# Patient Record
Sex: FEMALE | HISPANIC OR LATINO | Employment: FULL TIME | URBAN - METROPOLITAN AREA
[De-identification: names, ages, dates, MRNs, and addresses within clinical notes are randomized per-mention and may not be internally consistent; named-entity substitution may affect disease eponyms.]

---

## 2017-02-17 DIAGNOSIS — E11.9 TYPE 2 DIABETES MELLITUS WITHOUT COMPLICATION: ICD-10-CM

## 2017-06-15 ENCOUNTER — OFFICE VISIT (OUTPATIENT)
Dept: FAMILY MEDICINE | Facility: CLINIC | Age: 37
End: 2017-06-15
Payer: COMMERCIAL

## 2017-06-15 VITALS
OXYGEN SATURATION: 97 % | WEIGHT: 195.31 LBS | BODY MASS INDEX: 29.6 KG/M2 | HEART RATE: 103 BPM | TEMPERATURE: 98 F | HEIGHT: 68 IN | DIASTOLIC BLOOD PRESSURE: 80 MMHG | SYSTOLIC BLOOD PRESSURE: 120 MMHG

## 2017-06-15 DIAGNOSIS — Z00.00 ANNUAL PHYSICAL EXAM: Primary | ICD-10-CM

## 2017-06-15 DIAGNOSIS — N89.8 VAGINAL DISCHARGE: ICD-10-CM

## 2017-06-15 LAB
CANDIDA RRNA VAG QL PROBE: NEGATIVE
G VAGINALIS RRNA GENITAL QL PROBE: NEGATIVE
T VAGINALIS RRNA GENITAL QL PROBE: NEGATIVE

## 2017-06-15 PROCEDURE — 99999 PR PBB SHADOW E&M-EST. PATIENT-LVL IV: CPT | Mod: PBBFAC,,, | Performed by: FAMILY MEDICINE

## 2017-06-15 PROCEDURE — 87480 CANDIDA DNA DIR PROBE: CPT

## 2017-06-15 PROCEDURE — 99395 PREV VISIT EST AGE 18-39: CPT | Mod: S$GLB,,, | Performed by: FAMILY MEDICINE

## 2017-06-15 RX ORDER — METFORMIN HYDROCHLORIDE 1000 MG/1
1000 TABLET ORAL 2 TIMES DAILY WITH MEALS
Qty: 180 TABLET | Refills: 0 | Status: SHIPPED | OUTPATIENT
Start: 2017-06-15 | End: 2017-11-02 | Stop reason: SDUPTHER

## 2017-06-15 RX ORDER — INSULIN ASPART 100 [IU]/ML
8 INJECTION, SOLUTION INTRAVENOUS; SUBCUTANEOUS
Qty: 15 ML | Refills: 1 | Status: CANCELLED | OUTPATIENT
Start: 2017-06-15 | End: 2018-06-15

## 2017-06-15 RX ORDER — FLUCONAZOLE 150 MG/1
TABLET ORAL
Qty: 2 TABLET | Refills: 0 | Status: SHIPPED | OUTPATIENT
Start: 2017-06-15 | End: 2017-06-19 | Stop reason: SDUPTHER

## 2017-06-16 PROBLEM — Z00.00 ANNUAL PHYSICAL EXAM: Status: ACTIVE | Noted: 2017-06-16

## 2017-06-19 ENCOUNTER — OFFICE VISIT (OUTPATIENT)
Dept: OBSTETRICS AND GYNECOLOGY | Facility: CLINIC | Age: 37
End: 2017-06-19
Payer: COMMERCIAL

## 2017-06-19 VITALS
DIASTOLIC BLOOD PRESSURE: 68 MMHG | HEIGHT: 68 IN | SYSTOLIC BLOOD PRESSURE: 114 MMHG | BODY MASS INDEX: 29.7 KG/M2 | WEIGHT: 196 LBS

## 2017-06-19 DIAGNOSIS — N89.8 VAGINAL DISCHARGE: ICD-10-CM

## 2017-06-19 DIAGNOSIS — Z31.69 ENCOUNTER FOR PRECONCEPTION CONSULTATION: ICD-10-CM

## 2017-06-19 DIAGNOSIS — Z01.419 WELL FEMALE EXAM WITH ROUTINE GYNECOLOGICAL EXAM: Primary | ICD-10-CM

## 2017-06-19 DIAGNOSIS — Z30.011 ENCOUNTER FOR INITIAL PRESCRIPTION OF CONTRACEPTIVE PILLS: ICD-10-CM

## 2017-06-19 DIAGNOSIS — Z30.432 ENCOUNTER FOR IUD REMOVAL: ICD-10-CM

## 2017-06-19 PROCEDURE — 99395 PREV VISIT EST AGE 18-39: CPT | Mod: S$GLB,,, | Performed by: NURSE PRACTITIONER

## 2017-06-19 PROCEDURE — 87480 CANDIDA DNA DIR PROBE: CPT

## 2017-06-19 PROCEDURE — 99999 PR PBB SHADOW E&M-EST. PATIENT-LVL III: CPT | Mod: PBBFAC,,, | Performed by: NURSE PRACTITIONER

## 2017-06-19 RX ORDER — METRONIDAZOLE 500 MG/1
500 TABLET ORAL 2 TIMES DAILY
Qty: 14 TABLET | Refills: 1 | Status: SHIPPED | OUTPATIENT
Start: 2017-06-19 | End: 2017-06-26

## 2017-06-19 RX ORDER — NORETHINDRONE ACETATE AND ETHINYL ESTRADIOL 1MG-20(21)
1 KIT ORAL DAILY
Qty: 30 TABLET | Refills: 11 | Status: SHIPPED | OUTPATIENT
Start: 2017-06-19 | End: 2017-10-25

## 2017-06-19 RX ORDER — FLUCONAZOLE 150 MG/1
TABLET ORAL
Qty: 2 TABLET | Refills: 4 | Status: SHIPPED | OUTPATIENT
Start: 2017-06-19 | End: 2018-02-22 | Stop reason: SDUPTHER

## 2017-06-19 NOTE — PROGRESS NOTES
HISTORY OF PRESENT ILLNESS:    Tonya Rivas is a 36 y.o. female, , No LMP recorded. Patient is not currently having periods (Reason: Birth Control).,  presents for a routine exam , IUD removal and to start OCPs.  -Her Iud has  and she has decided she doesn't want another one.   - is scheduled for a vasectomy, but until he actually goes for the appointment she wants low dose OCPs.  -Possibly wants to have another baby.  -Recently self treated for yeast and BV symptoms and wants culture to be sure these infections are gone and refills.   -These recurrent infections cause dyspareunia and decreased libido and not sure if related to the IUD or not.     Past Medical History:   Diagnosis Date    Chronic kidney disease     Diabetes mellitus type II     Herpes simplex virus (HSV) infection     History of migraine headaches     Kidney stones     Obesity 2013    VERA (obstructive sleep apnea)        Past Surgical History:   Procedure Laterality Date     SECTION      x3    EXTRACORPOREAL SHOCK WAVE LITHOTRIPSY      LAPAROSCOPIC GASTRIC BANDING         MEDICATIONS AND ALLERGIES:  Insulin  Prinivil  Glucophage  Review of patient's allergies indicates:  No Known Allergies    Family History   Problem Relation Age of Onset    Cancer Mother     Learning disabilities Son     Heart disease Maternal Grandfather     Breast cancer Neg Hx     Colon cancer Neg Hx     Ovarian cancer Neg Hx        Social History     Social History    Marital status:      Spouse name: N/A    Number of children: N/A    Years of education: N/A     Occupational History    Not on file.     Social History Main Topics    Smoking status: Never Smoker    Smokeless tobacco: Never Used    Alcohol use No      Comment: social    Drug use: No    Sexual activity: Yes     Partners: Male     Birth control/ protection: IUD     Other Topics Concern    Not on file     Social History Narrative    No narrative on  "file       OBSTETRIC HISTORY:   Number of cesareans: 3   Number of vaginal deliveries: 1  Number of PTD: 2  Number of living children: 3 (one  at age 3 due to lissencephaly)     COMPREHENSIVE GYN HISTORY:  PAP History: Denies abnormal Paps. LAST PAP 16 NORMAL.  Infection History: Reports STDs. Herpes. Denies PID.  Benign History: Denies uterine fibroids. Denies ovarian cysts. Denies endometriosis. Denies other conditions.  Cancer History: Denies cervical cancer. Denies uterine cancer or hyperplasia. Denies ovarian cancer. Denies vulvar cancer or pre-cancer. Denies vaginal cancer or pre-cancer. Denies breast cancer. Denies colon cancer.  Sexual Activity History: Reports being sexually active.  Menstrual History: Denies menses.   Dysmenorrhea History: Denies dysmenorrhea. Has monthly cramping ? IUD.   Contraception History: Mirena IUD 3-15-12 (second one).     ROS:  GENERAL: No weight changes. No swelling. No fatigue. No fever.  CARDIOVASCULAR: No chest pain. No shortness of breath. No leg cramps.   NEUROLOGICAL: No headaches. No vision changes.  BREASTS: No pain. No lumps. No discharge.  ABDOMEN: No pain. No nausea. No vomiting. No diarrhea. No constipation.  REPRODUCTIVE: AMENORRHEA.   VULVA: No pain. No lesions. No itching.  VAGINA: No relaxation. No itching. No odor. No discharge. No lesions.  URINARY: No incontinence. No nocturia. No frequency. No dysuria.    /68   Ht 5' 8" (1.727 m)   Wt 88.9 kg (196 lb)   BMI 29.80 kg/m²     PE:  APPEARANCE: Well nourished, well developed, in no acute distress.  AFFECT: WNL, alert and oriented x 3.  SKIN: No acne or hirsutism.  NECK: Neck symmetric, without masses or thyromegaly.  NODES: No inguinal, cervical, axillary or femoral lymph node enlargement.  CHEST: Good respiratory effort.   ABDOMEN: Soft. No tenderness or masses.   BREASTS: Symmetrical, no skin changes, visible lesions, palpable masses or nipple discharge bilaterally.  PELVIC: External female " genitalia without lesions.  Female hair distribution. Adequate perineal body, Normal urethral meatus. Vagina moist and well rugated without lesions. MUCOID D/C present.  No significant cystocele or rectocele present. Cervix pink without lesions, discharge or tenderness. IUD STRINGS VISUALIZED AT OS.  Uterus is 8 week size, regular, mobile and nontender. Adnexa without masses or tenderness.  EXTREMITIES: No edema    PROCEDURES:    IUD REMOVAL:    Tonya Rivas is a 36 y.o. female  presents for IUD removal because it is time for scheduled removal.    PRE-IUD REMOVAL COUNSELING:  The patient was advised of minimal risks of bleeding and pain and she agrees to proceed.    PROCEDURE:  A time out was performed.  IUD strings were visualized at the os and grasped. IUD removed with gentle traction. The patient tolerated the procedurewell.    ASSESSMENT:  Contraceptive Management / Removal IUD.    DIAGNOSIS:  1. Well female exam with routine gynecological exam    2. Encounter for IUD removal    3. Encounter for initial prescription of contraceptive pills    4. Vaginal discharge    5. Encounter for preconception consultation        PLAN:    Orders Placed This Encounter    Vaginosis Screen by DNA Probe    norethindrone-ethinyl estradiol (JUNEL FE ) 1 mg-20 mcg (21)/75 mg (7) per tablet    fluconazole (DIFLUCAN) 150 MG Tab    metronidazole (FLAGYL) 500 MG tablet     POST IUD REMOVAL COUNSELING:  Expect period-like flow to occur after Mirena IUD removal and periods to return to pre-IUD pattern.  Manage post IUD removal cramping with NSAIDs, Tylenol or Rx per [employer].    COUNSELING:  The patient was counseled today on:  -OCP use and potential side effects;  -vaginitis prevention same as note 1-16;  -Flagyl and Diflucan use and potential side effects;  -procreation and future pregnancy, including optimal timing for becoming pregnant, use of prenatal vitamins that contain folate and iron, avoidance of alcohol and  caffeine during early pregnancy, plus the negative effects of smoking on fecundity and on development of an early pregnancy, to review her immunization history and to update as necessary, to review her family history for potential inherited diseases that would require  screening;  -that she would be high risk for infertility, miscarriage, Pre-E again, baby with chromosomal abnormality if she decided to conceive again;  -A.C.S. Pap and pelvic exam guidelines (pap every 3 years), recomendations for yearly mammogram;  -to follow up with her PCP for other health maintenance.    FOLLOW-UP with me pending test results and annually.

## 2017-06-20 LAB
CANDIDA RRNA VAG QL PROBE: POSITIVE
G VAGINALIS RRNA GENITAL QL PROBE: NEGATIVE
T VAGINALIS RRNA GENITAL QL PROBE: NEGATIVE

## 2017-06-21 ENCOUNTER — TELEPHONE (OUTPATIENT)
Dept: ADMINISTRATIVE | Facility: HOSPITAL | Age: 37
End: 2017-06-21

## 2017-06-21 NOTE — TELEPHONE ENCOUNTER
A request was sent on today to Dr. Peña's office for a copy of the patient's most recent diabetic eye exam. I spoke w/ Dolores, she will fax over the report on today.

## 2017-09-18 PROBLEM — Z00.00 ANNUAL PHYSICAL EXAM: Status: RESOLVED | Noted: 2017-06-16 | Resolved: 2017-09-18

## 2017-10-19 ENCOUNTER — TELEPHONE (OUTPATIENT)
Dept: FAMILY MEDICINE | Facility: CLINIC | Age: 37
End: 2017-10-19

## 2017-10-19 NOTE — TELEPHONE ENCOUNTER
Patient would like an order for POCT pregnancy and serum pregnancy, in the event the POCTs is pos. Will get test done at [facility].

## 2017-10-25 ENCOUNTER — TELEPHONE (OUTPATIENT)
Dept: FAMILY MEDICINE | Facility: CLINIC | Age: 37
End: 2017-10-25

## 2017-10-25 ENCOUNTER — LAB VISIT (OUTPATIENT)
Dept: LAB | Facility: HOSPITAL | Age: 37
End: 2017-10-25
Attending: FAMILY MEDICINE
Payer: COMMERCIAL

## 2017-10-25 ENCOUNTER — OFFICE VISIT (OUTPATIENT)
Dept: FAMILY MEDICINE | Facility: CLINIC | Age: 37
End: 2017-10-25
Payer: COMMERCIAL

## 2017-10-25 VITALS
HEIGHT: 68 IN | TEMPERATURE: 98 F | WEIGHT: 203.25 LBS | BODY MASS INDEX: 30.8 KG/M2 | OXYGEN SATURATION: 99 % | SYSTOLIC BLOOD PRESSURE: 130 MMHG | DIASTOLIC BLOOD PRESSURE: 80 MMHG | RESPIRATION RATE: 12 BRPM | HEART RATE: 106 BPM

## 2017-10-25 DIAGNOSIS — E11.00 TYPE 2 DIABETES MELLITUS WITH HYPEROSMOLARITY WITHOUT COMA, WITHOUT LONG-TERM CURRENT USE OF INSULIN: ICD-10-CM

## 2017-10-25 DIAGNOSIS — E11.00 TYPE 2 DIABETES MELLITUS WITH HYPEROSMOLARITY WITHOUT COMA, WITHOUT LONG-TERM CURRENT USE OF INSULIN: Primary | ICD-10-CM

## 2017-10-25 DIAGNOSIS — Z32.01 POSITIVE PREGNANCY TEST: ICD-10-CM

## 2017-10-25 DIAGNOSIS — Z30.011 ENCOUNTER FOR INITIAL PRESCRIPTION OF CONTRACEPTIVE PILLS: ICD-10-CM

## 2017-10-25 LAB
ALBUMIN SERPL BCP-MCNC: 3.3 G/DL
ALP SERPL-CCNC: 106 U/L
ALT SERPL W/O P-5'-P-CCNC: 31 U/L
ANION GAP SERPL CALC-SCNC: 10 MMOL/L
AST SERPL-CCNC: 25 U/L
BASOPHILS # BLD AUTO: 0.02 K/UL
BASOPHILS NFR BLD: 0.3 %
BILIRUB SERPL-MCNC: 0.4 MG/DL
BUN SERPL-MCNC: 12 MG/DL
CALCIUM SERPL-MCNC: 9.4 MG/DL
CHLORIDE SERPL-SCNC: 100 MMOL/L
CHOLEST SERPL-MCNC: 209 MG/DL
CHOLEST/HDLC SERPL: 4.8 {RATIO}
CO2 SERPL-SCNC: 23 MMOL/L
CREAT SERPL-MCNC: 1 MG/DL
DIFFERENTIAL METHOD: NORMAL
EOSINOPHIL # BLD AUTO: 0.1 K/UL
EOSINOPHIL NFR BLD: 0.8 %
ERYTHROCYTE [DISTWIDTH] IN BLOOD BY AUTOMATED COUNT: 12 %
EST. GFR  (AFRICAN AMERICAN): >60 ML/MIN/1.73 M^2
EST. GFR  (NON AFRICAN AMERICAN): >60 ML/MIN/1.73 M^2
ESTIMATED AVG GLUCOSE: 260 MG/DL
GLUCOSE SERPL-MCNC: 485 MG/DL
HBA1C MFR BLD HPLC: 10.7 %
HCG INTACT+B SERPL-ACNC: <1.2 MIU/ML
HCT VFR BLD AUTO: 38.8 %
HDLC SERPL-MCNC: 44 MG/DL
HDLC SERPL: 21.1 %
HGB BLD-MCNC: 12.9 G/DL
IMM GRANULOCYTES # BLD AUTO: 0.02 K/UL
IMM GRANULOCYTES NFR BLD AUTO: 0.3 %
LDLC SERPL CALC-MCNC: 107.2 MG/DL
LYMPHOCYTES # BLD AUTO: 2 K/UL
LYMPHOCYTES NFR BLD: 32.9 %
MCH RBC QN AUTO: 28.2 PG
MCHC RBC AUTO-ENTMCNC: 33.2 G/DL
MCV RBC AUTO: 85 FL
MONOCYTES # BLD AUTO: 0.4 K/UL
MONOCYTES NFR BLD: 6.5 %
NEUTROPHILS # BLD AUTO: 3.5 K/UL
NEUTROPHILS NFR BLD: 59.2 %
NONHDLC SERPL-MCNC: 165 MG/DL
NRBC BLD-RTO: 0 /100 WBC
PLATELET # BLD AUTO: 275 K/UL
PMV BLD AUTO: 10.3 FL
POTASSIUM SERPL-SCNC: 4.2 MMOL/L
PROT SERPL-MCNC: 7.1 G/DL
RBC # BLD AUTO: 4.57 M/UL
SODIUM SERPL-SCNC: 133 MMOL/L
TRIGL SERPL-MCNC: 289 MG/DL
TSH SERPL DL<=0.005 MIU/L-ACNC: 0.73 UIU/ML
WBC # BLD AUTO: 5.98 K/UL

## 2017-10-25 PROCEDURE — 83036 HEMOGLOBIN GLYCOSYLATED A1C: CPT

## 2017-10-25 PROCEDURE — 99214 OFFICE O/P EST MOD 30 MIN: CPT | Mod: S$GLB,,, | Performed by: FAMILY MEDICINE

## 2017-10-25 PROCEDURE — 99999 PR PBB SHADOW E&M-EST. PATIENT-LVL III: CPT | Mod: PBBFAC,,, | Performed by: FAMILY MEDICINE

## 2017-10-25 PROCEDURE — 80053 COMPREHEN METABOLIC PANEL: CPT

## 2017-10-25 PROCEDURE — 84702 CHORIONIC GONADOTROPIN TEST: CPT

## 2017-10-25 PROCEDURE — 85025 COMPLETE CBC W/AUTO DIFF WBC: CPT

## 2017-10-25 PROCEDURE — 84443 ASSAY THYROID STIM HORMONE: CPT

## 2017-10-25 PROCEDURE — 80061 LIPID PANEL: CPT

## 2017-10-25 PROCEDURE — 36415 COLL VENOUS BLD VENIPUNCTURE: CPT | Mod: PO

## 2017-10-25 RX ORDER — NORELGESTROMIN AND ETHINYL ESTRADIOL 35; 150 UG/MG; UG/MG
1 PATCH TRANSDERMAL WEEKLY
Qty: 4 PATCH | Refills: 11 | Status: SHIPPED | OUTPATIENT
Start: 2017-10-25 | End: 2018-08-07 | Stop reason: ALTCHOICE

## 2017-10-25 NOTE — PROGRESS NOTES
Subjective:       Patient ID: Tonya Rivas is a 37 y.o. female.    Chief Complaint: Annual Exam    Patient presents for follow-up on diabetes. She states she stopped insulin and took herbal supplmentes and lost 45 pounds. She would like to try victoza. She was on metfromin for about 2 weeks now.     She is also going to [place] and wants to check on immunizations. 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 She would need typhoid, tetanus, and hepatitis A vaccines.    She also wants to make sure that she is not pregnant.    She also is taking a low dose contraception. She has had a positive urine pregnancy screening.       Diabetes   She has type 2 diabetes mellitus. No MedicAlert identification noted. The initial diagnosis of diabetes was made 11 years ago. Pertinent negatives for hypoglycemia include no confusion, dizziness, headaches, hunger, mood changes, nervousness/anxiousness, seizures, sleepiness, speech difficulty, sweats or tremors. Associated symptoms include fatigue. Pertinent negatives for diabetes include no blurred vision, no chest pain, no foot paresthesias, no foot ulcerations, no polydipsia, no polyphagia, no polyuria, no visual change, no weakness and no weight loss. Symptoms are stable. Pertinent negatives for diabetic complications include no autonomic neuropathy, CVA, heart disease, impotence, nephropathy, peripheral neuropathy, PVD or retinopathy. There are no known risk factors for coronary artery disease. Current diabetic treatment includes diet and oral agent (monotherapy). She is compliant with treatment most of the time. She is currently taking insulin at bedtime. Insulin injections are given by patient.     Review of Systems   Constitutional: Positive for fatigue. Negative for weight loss.   Eyes: Negative for blurred vision.   Cardiovascular: Negative for chest pain.   Endocrine: Negative for polydipsia, polyphagia and polyuria.   Genitourinary: Negative for dysuria,  "hematuria, impotence, pelvic pain and urgency.   Neurological: Negative for dizziness, tremors, seizures, speech difficulty, weakness and headaches.   Psychiatric/Behavioral: Negative for confusion. The patient is not nervous/anxious.        Objective:       Vitals:    10/25/17 1439   BP: 130/80   Pulse: 106   Resp: 12   Temp: 98.2 °F (36.8 °C)   TempSrc: Oral   SpO2: 99%   Weight: 92.2 kg (203 lb 4.2 oz)   Height: 5' 8" (1.727 m)       Physical Exam   Constitutional: She is oriented to person, place, and time. She appears well-developed and well-nourished. No distress.   HENT:   Head: Normocephalic and atraumatic.   Eyes: Conjunctivae are normal.   Neck: Normal range of motion. Neck supple. Carotid bruit is not present.   Cardiovascular: Normal rate, regular rhythm and normal heart sounds.  Exam reveals no gallop and no friction rub.    No murmur heard.  Pulmonary/Chest: Effort normal and breath sounds normal. No respiratory distress. She has no wheezes. She has no rales.   Musculoskeletal: She exhibits no edema.   Neurological: She is alert and oriented to person, place, and time.   Skin: She is not diaphoretic.       Assessment:       1. Type 2 diabetes mellitus with hyperosmolarity without coma, without long-term current use of insulin    2. Positive pregnancy test    3. Encounter for initial prescription of contraceptive pills        Plan:       Tonya was seen today for annual exam.    Diagnoses and all orders for this visit:    Type 2 diabetes mellitus with hyperosmolarity without coma, without long-term current use of insulin  -     Comprehensive metabolic panel; Future  -     Lipid panel; Future  -     Hemoglobin A1c; Future  -     Microalbumin/creatinine urine ratio; Future  -     TSH; Future  -     CBC auto differential; Future  Due for labs    Positive pregnancy test  -     Pregnancy Test Screening, Serum; Future    Encounter for initial prescription of contraceptive pills  -     norelgestromin-ethinyl " estradiol (ORTHO EVRA) 150-35 mcg/24 hr; Place 1 patch onto the skin once a week.

## 2017-10-26 ENCOUNTER — PATIENT MESSAGE (OUTPATIENT)
Dept: FAMILY MEDICINE | Facility: CLINIC | Age: 37
End: 2017-10-26

## 2017-10-26 NOTE — TELEPHONE ENCOUNTER
Received a call for a critical glucose of 485. Spoke with pt and she feels well, is not having nausea of pain. She tried to check her sugar while we were on the phone and it read error. She has novolog on hand, can get levemir filled. I advised her to give herself 10-15 units of novolog and check her sugar. May repeat again if error reading until sugar is in the 300s. Discussed test results and that cholesterol is high and she is not pregnant.    She may call again after giving the insulin and we can review her sugar results.     Alanis Herman MD

## 2017-10-27 ENCOUNTER — TELEPHONE (OUTPATIENT)
Dept: FAMILY MEDICINE | Facility: CLINIC | Age: 37
End: 2017-10-27

## 2017-10-27 RX ORDER — SALMONELLA TYPHI TY2 VI POLYSACCHARIDE ANTIGEN 25 UG/.5ML
INJECTION, SOLUTION INTRAMUSCULAR
Refills: 0 | COMMUNITY
Start: 2017-10-26 | End: 2018-08-07 | Stop reason: ALTCHOICE

## 2017-10-27 RX ORDER — TETANUS TOXOID, REDUCED DIPHTHERIA TOXOID AND ACELLULAR PERTUSSIS VACCINE, ADSORBED 5; 2.5; 8; 8; 2.5 [IU]/.5ML; [IU]/.5ML; UG/.5ML; UG/.5ML; UG/.5ML
SUSPENSION INTRAMUSCULAR
Refills: 0 | COMMUNITY
Start: 2017-10-26 | End: 2018-08-07 | Stop reason: ALTCHOICE

## 2017-10-27 NOTE — TELEPHONE ENCOUNTER
----- Message from Pau Granado sent at 10/27/2017 10:16 AM CDT -----  Contact: [facility] [phone]  Pt is requesting a refill on the novolog flex pen Please call  at your earliest convenience.  Thanks !

## 2017-10-27 NOTE — TELEPHONE ENCOUNTER
----- Message from Janeth Avelar sent at 10/27/2017  4:44 PM CDT -----  Contact: self  Patient is returning Marycarmen's call . She has questions about new meds       [phone]   LL

## 2017-10-27 NOTE — TELEPHONE ENCOUNTER
Spoke with patient.  Patient requesting prescription for Novolog Flex Pen.  Also, requesting medication that does not increase weight.  States will  the Victoza today. Reports compliance with Metformin twice a day and 40 pound weight loss.

## 2017-10-30 NOTE — TELEPHONE ENCOUNTER
She last saw Dr. Cabrera and discussed victoza.   I did not see her last and did not discuss this with her.  If she wishes to have this discussion with me, she would need an appointment.

## 2017-11-02 ENCOUNTER — TELEPHONE (OUTPATIENT)
Dept: FAMILY MEDICINE | Facility: CLINIC | Age: 37
End: 2017-11-02

## 2017-11-02 RX ORDER — METFORMIN HYDROCHLORIDE 1000 MG/1
TABLET ORAL
Qty: 180 TABLET | Refills: 0 | Status: SHIPPED | OUTPATIENT
Start: 2017-11-02 | End: 2018-02-22 | Stop reason: SDUPTHER

## 2017-11-02 NOTE — TELEPHONE ENCOUNTER
Patient stated she has tried those OTC medication and other prescribed pain medications, but nothing is relieving the pain.  Stated she will discuss with Dr. Blanco personally.

## 2017-11-02 NOTE — TELEPHONE ENCOUNTER
Patient requesting recommendations for arm pain after vaccines.  Stated she received Tdap and Hepatitis A vaccines on Wednesday (10/25/2017) at [facility]; and she is still having pain in her arm.  Stated earlier in the week the pain was radiating into her elbow.  Stated this is the first time this has happened after an injection.   Would like to know what she can do to get relief from the pain.    Patient stated that her daughter has an appointment with Dr. Blanco today @ 3:00pm.  If an office visit is needed, can she be scheduled close to that appointment.      Please advise.

## 2017-11-04 ENCOUNTER — PATIENT MESSAGE (OUTPATIENT)
Dept: FAMILY MEDICINE | Facility: CLINIC | Age: 37
End: 2017-11-04

## 2017-12-19 ENCOUNTER — TELEPHONE (OUTPATIENT)
Dept: ENDOCRINOLOGY | Facility: CLINIC | Age: 37
End: 2017-12-19

## 2017-12-19 NOTE — TELEPHONE ENCOUNTER
Left message on patient's voicemail to return call to clinic regarding scheduling Diabetes management appointment with Montserrat Macdonald NP for elevated A1c. Waiting to hear back.

## 2018-02-22 DIAGNOSIS — N89.8 VAGINAL DISCHARGE: ICD-10-CM

## 2018-02-22 RX ORDER — FLUCONAZOLE 150 MG/1
TABLET ORAL
Qty: 2 TABLET | Refills: 0 | OUTPATIENT
Start: 2018-02-22

## 2018-02-22 NOTE — TELEPHONE ENCOUNTER
LOV 10/25/2017  Last refill  Jardiance  11/6/2017                   Diflucan  6/19/2017                   Glucophage 11/2/2017    HgA1c  10.7  10/25/2017      Patient requesting orders for labs.  Please advise.

## 2018-02-22 NOTE — TELEPHONE ENCOUNTER
----- Message from Jessenia Vela sent at 2/22/2018 10:34 AM CST -----  Contact: self  Pt is requesting 90 day refill: empagliflozin (JARDIANCE) 25 mg Tab                                                 metFORMIN (GLUCOPHAGE) 1000 MG tablet                                                 fluconazole (DIFLUCAN) 150 MG Tab  Please add orders to chart for pt's labs.  Send to [facility] [address]  [place] [address] [phone]    Contact pt at [phone].    Thanks-

## 2018-02-23 DIAGNOSIS — N89.8 VAGINAL DISCHARGE: ICD-10-CM

## 2018-02-23 RX ORDER — FLUCONAZOLE 150 MG/1
TABLET ORAL
Qty: 2 TABLET | Refills: 0 | Status: SHIPPED | OUTPATIENT
Start: 2018-02-23 | End: 2018-08-07 | Stop reason: SDUPTHER

## 2018-02-23 RX ORDER — FLUCONAZOLE 150 MG/1
TABLET ORAL
Qty: 2 TABLET | Refills: 4 | Status: SHIPPED | OUTPATIENT
Start: 2018-02-23 | End: 2019-03-04 | Stop reason: SDUPTHER

## 2018-02-23 RX ORDER — METFORMIN HYDROCHLORIDE 1000 MG/1
1000 TABLET ORAL 2 TIMES DAILY WITH MEALS
Qty: 180 TABLET | Refills: 0 | Status: SHIPPED | OUTPATIENT
Start: 2018-02-23 | End: 2018-08-24 | Stop reason: SDUPTHER

## 2018-08-07 ENCOUNTER — OFFICE VISIT (OUTPATIENT)
Dept: FAMILY MEDICINE | Facility: CLINIC | Age: 38
End: 2018-08-07
Payer: COMMERCIAL

## 2018-08-07 VITALS
BODY MASS INDEX: 30.67 KG/M2 | HEART RATE: 78 BPM | HEIGHT: 68 IN | WEIGHT: 202.38 LBS | TEMPERATURE: 98 F | SYSTOLIC BLOOD PRESSURE: 135 MMHG | DIASTOLIC BLOOD PRESSURE: 86 MMHG

## 2018-08-07 DIAGNOSIS — L03.012 PARONYCHIA OF FINGER OF LEFT HAND: Primary | ICD-10-CM

## 2018-08-07 DIAGNOSIS — E11.9 TYPE 2 DIABETES MELLITUS WITHOUT COMPLICATION, WITHOUT LONG-TERM CURRENT USE OF INSULIN: ICD-10-CM

## 2018-08-07 PROCEDURE — 99999 PR PBB SHADOW E&M-EST. PATIENT-LVL III: CPT | Mod: PBBFAC,,, | Performed by: INTERNAL MEDICINE

## 2018-08-07 PROCEDURE — 99214 OFFICE O/P EST MOD 30 MIN: CPT | Mod: S$GLB,,, | Performed by: INTERNAL MEDICINE

## 2018-08-07 PROCEDURE — 3008F BODY MASS INDEX DOCD: CPT | Mod: CPTII,S$GLB,, | Performed by: INTERNAL MEDICINE

## 2018-08-07 PROCEDURE — 3079F DIAST BP 80-89 MM HG: CPT | Mod: CPTII,S$GLB,, | Performed by: INTERNAL MEDICINE

## 2018-08-07 PROCEDURE — 3046F HEMOGLOBIN A1C LEVEL >9.0%: CPT | Mod: CPTII,S$GLB,, | Performed by: INTERNAL MEDICINE

## 2018-08-07 PROCEDURE — 3075F SYST BP GE 130 - 139MM HG: CPT | Mod: CPTII,S$GLB,, | Performed by: INTERNAL MEDICINE

## 2018-08-07 RX ORDER — SULFAMETHOXAZOLE AND TRIMETHOPRIM 800; 160 MG/1; MG/1
1 TABLET ORAL 2 TIMES DAILY
Qty: 14 TABLET | Refills: 0 | Status: SHIPPED | OUTPATIENT
Start: 2018-08-07 | End: 2018-08-14

## 2018-08-07 NOTE — PROGRESS NOTES
This note was created by combination of typed  and Dragon dictation.  Transcription errors may be present.  If there are any questions, please contact me.    Assessment / Plan:   Paronychia of finger of left hand  -will treat with bactrim.  Keep clean and covered.  Stop the cipro.  Almost looks like pyogenic granuloma - if no improvement, to dermatology.  -     sulfamethoxazole-trimethoprim 800-160mg (BACTRIM DS) 800-160 mg Tab; Take 1 tablet by mouth 2 (two) times daily. for 7 days  Dispense: 14 tablet; Refill: 0    Type 2 diabetes mellitus without complication, without long-term current use of insulin  -had labs done recently with [facility] but finds it more convenient to come to [facility].  Outside labs requested.  [facility] for optometry - outside notes requested. If a1c high, on max dose of metformin, add back glipizide.  Hx of victoza but not covered and high out of pocket until deductible met. Hx of tresiba until insurance had changed.  Denies pregnancy risk and denies intent to get pregnant.  Has BCP patches and will restart.  Pneumovax done with work per pt    Medications Discontinued During This Encounter   Medication Reason    BOOSTRIX TDAP 2.5-8-5 Lf-mcg-Lf/0.5mL Syrg injection Therapy completed    empagliflozin (JARDIANCE) 25 mg Tab Therapy completed    fluconazole (DIFLUCAN) 150 MG Tab Duplicate Order    HAVRIX, PF, 1,440 Noreen unit/mL Susp Therapy completed    liraglutide 0.6 mg/0.1 mL, 18 mg/3 mL, subq PNIJ (VICTOZA 2-DOE) 0.6 mg/0.1 mL (18 mg/3 mL) PnIj Therapy completed    norelgestromin-ethinyl estradiol (ORTHO EVRA) 150-35 mcg/24 hr Therapy completed    TYPHIM VI 25 mcg/0.5 mL Soln Therapy completed     Modified Medications    No medications on file     New Prescriptions    SULFAMETHOXAZOLE-TRIMETHOPRIM 800-160MG (BACTRIM DS) 800-160 MG TAB    Take 1 tablet by mouth 2 (two) times daily. for 7 days         Follow Up: No Follow-up on file.      Subjective:   No chief complaint on  file.      HPI  Tonya is a 38 y.o. female, last appointment with this clinic was Visit date not found.    No LMP recorded. Patient is not currently having periods (Reason: Birth Control).    She comes in today complaining of bleeding and a growth on her finger.  She works in dialysis, and was clamping on some medications and she pinched her finger.  It was not a needle stick.  She did not think much of it until it started to sting when she applied alcohol hand .  Then she started noticing a growth appear on the lateral edge of her nail bed, and it started to drain serous fluid.  No associated systemic fevers or chills.  No swelling to the finger.  She had some leftover Cipro from when she was traveling and she started to take that without improvement.    Diabetes. Last a1c 10/2017, 10.7.  She most recently saw Dr. alfaro than once, and had labs done about a month ago but did not get the results of that.  She is currently taking metformin monotherapy 1000 twice daily.  Most recently history of Actos a but with insurance change she has a high deductible and thus Victoza is expensive.  Similarly with Trulicity.  History of Tresiba before she lost insurance previously.  That had actually worked well.  But then she got new insurance and she had had a high deductible so thus did not rechallenge on Tresiba.  Eyes Dr. Peña    Patient Care Team:  Marilia Woods MD as PCP - General (Family Medicine)    Patient Active Problem List    Diagnosis Date Noted    Type 2 diabetes mellitus 2016    Obesity 2013       PAST MEDICAL HISTORY:  Past Medical History:   Diagnosis Date    Chronic kidney disease     Diabetes mellitus type II     Herpes simplex virus (HSV) infection     History of migraine headaches     Kidney stones     Obesity 2013    VERA (obstructive sleep apnea)        PAST SURGICAL HISTORY:  Past Surgical History:   Procedure Laterality Date     SECTION      x3    EXTRACORPOREAL  "SHOCK WAVE LITHOTRIPSY      LAPAROSCOPIC GASTRIC BANDING         SOCIAL HISTORY:  Social History     Social History    Marital status:      Spouse name: N/A    Number of children: N/A    Years of education: N/A     Occupational History    Not on file.     Social History Main Topics    Smoking status: Never Smoker    Smokeless tobacco: Never Used    Alcohol use No      Comment: social    Drug use: No    Sexual activity: Yes     Partners: Male     Birth control/ protection: IUD     Other Topics Concern    Not on file     Social History Narrative    No narrative on file       ALLERGIES AND MEDICATIONS: updated and reviewed.  Review of patient's allergies indicates:  No Known Allergies  Current Outpatient Prescriptions   Medication Sig Dispense Refill    BD INSULIN SYRINGE ULTRA-FINE 1/2 mL 31 x 5/16" Syrg   2    blood sugar diagnostic Strp Checks 3 times daily for Relion Prime meter 100 each 12    BLOOD-GLUCOSE METER (ONE TOUCH BASIC SYSTEM MISC) x x x x.  test blood sugars twice daily.One touch ultra mini test strips and lancets brand      fluconazole (DIFLUCAN) 150 MG Tab TAKE 1 TABLET BY MOUTH ONCE AND MAY RETREAT IN 3 DAYS IF STILL SYMPTOMATIC 2 tablet 4    insulin syringe-needle U-100 (BD INSULIN SYRINGE ULTRA-FINE) 0.3 mL 31 gauge x 5/16 Syrg For twice a day insulin injections 90 each 2    lancets Misc 1 Device by Misc.(Non-Drug; Combo Route) route 2 (two) times daily. Patient has Relion Prime meter.  Tests 3 times daily      metFORMIN (GLUCOPHAGE) 1000 MG tablet Take 1 tablet (1,000 mg total) by mouth 2 (two) times daily with meals. 180 tablet 0    pen needle, diabetic (NOVOTWIST) 32 gauge x 1/5" Ndle Check blood sugar  each 3     No current facility-administered medications for this visit.        Review of Systems   Constitutional: Negative for chills and fever.   Respiratory: Negative for shortness of breath.    Cardiovascular: Negative for chest pain and palpitations. " "  Neurological: Negative for dizziness, tingling and headaches.       Objective:   Physical Exam   Vitals:    08/07/18 1422   BP: 135/86   Pulse: 78   Temp: 98.1 °F (36.7 °C)   Weight: 91.8 kg (202 lb 6.1 oz)   Height: 5' 8" (1.727 m)    Body mass index is 30.77 kg/m².  Weight: 91.8 kg (202 lb 6.1 oz)   Height: 5' 8" (172.7 cm)     Physical Exam   Constitutional: She is oriented to person, place, and time. She appears well-developed and well-nourished. No distress.   Eyes: EOM are normal.   Cardiovascular:   No murmur heard.  Pulses:       Dorsalis pedis pulses are 1+ on the right side, and 1+ on the left side.        Posterior tibial pulses are 0 on the right side, and 0 on the left side.   Musculoskeletal: Normal range of motion. She exhibits no edema.        Right foot: There is no deformity.        Left foot: There is no deformity.   Feet:   Right Foot:   Protective Sensation: 5 sites tested. 5 sites sensed.   Skin Integrity: Negative for ulcer, blister, skin breakdown, erythema or warmth.   Left Foot:   Protective Sensation: 5 sites tested. 5 sites sensed.   Skin Integrity: Negative for ulcer, blister, skin breakdown, erythema or warmth.   Neurological: She is alert and oriented to person, place, and time. Coordination normal.   Skin: Skin is warm and dry.   Psychiatric: She has a normal mood and affect. Her behavior is normal. Thought content normal.     "

## 2018-08-13 ENCOUNTER — PATIENT MESSAGE (OUTPATIENT)
Dept: FAMILY MEDICINE | Facility: CLINIC | Age: 38
End: 2018-08-13

## 2018-08-13 DIAGNOSIS — L98.9 LESION OF FINGER: Primary | ICD-10-CM

## 2018-08-14 ENCOUNTER — OFFICE VISIT (OUTPATIENT)
Dept: DERMATOLOGY | Facility: CLINIC | Age: 38
End: 2018-08-14
Payer: COMMERCIAL

## 2018-08-14 DIAGNOSIS — R61 HYPERHIDROSIS: Primary | ICD-10-CM

## 2018-08-14 DIAGNOSIS — D48.5 NEOPLASM OF UNCERTAIN BEHAVIOR OF SKIN: ICD-10-CM

## 2018-08-14 PROCEDURE — 88305 TISSUE EXAM BY PATHOLOGIST: CPT | Mod: 26,,, | Performed by: PATHOLOGY

## 2018-08-14 PROCEDURE — 11100 PR BIOPSY OF SKIN LESION: CPT | Mod: S$GLB,,, | Performed by: DERMATOLOGY

## 2018-08-14 PROCEDURE — 99203 OFFICE O/P NEW LOW 30 MIN: CPT | Mod: 25,S$GLB,, | Performed by: DERMATOLOGY

## 2018-08-14 PROCEDURE — 88305 TISSUE EXAM BY PATHOLOGIST: CPT | Performed by: PATHOLOGY

## 2018-08-14 NOTE — PROGRESS NOTES
Subjective:       Patient ID:  Tonya Rivas is a 38 y.o. female who presents for   Chief Complaint   Patient presents with    Growth     L middle finger    Excessive Sweating     behind thighs     Growth  - Initial  Affected locations: left fingers  Duration: 1 week  Signs / symptoms: tender, pain, oozing and redness  Severity: moderate to severe  Timing: constant  Aggravated by: pressure and friction  Relieving factors/Treatments tried: OTC antibiotic cream (just completed Bactrim DS)  Improvement on treatment: no relief    Excessive Sweating  - Initial  Affected locations: left lower leg and right lower leg  Duration: 10 years  Signs and Symptoms: sweating.  Severity: moderate  Timing: constant  Aggravated by: nothing  Treatments tried: roll on deoderants.  Improvement on treatment: no relief      Review of Systems   Musculoskeletal: Negative for joint swelling and arthralgias.   Skin: Positive for recent sunburn. Negative for tendency to form keloidal scars.   Hematologic/Lymphatic: Bruises/bleeds easily (bruises unknown cause).        Objective:    Physical Exam   Constitutional: She appears well-developed and well-nourished. No distress.   Neurological: She is alert and oriented to person, place, and time. She is not disoriented.   Psychiatric: She has a normal mood and affect.   Skin:   Areas Examined (abnormalities noted in diagram):   Head / Face Inspection Performed  Neck Inspection Performed  RUE Inspected  LUE Inspection Performed  RLE Inspected  LLE Inspection Performed  Nails and Digits Inspection Performed             Diagram Legend     Erythematous scaling macule/papule c/w actinic keratosis       Vascular papule c/w angioma      Pigmented verrucoid papule/plaque c/w seborrheic keratosis      Yellow umbilicated papule c/w sebaceous hyperplasia      Irregularly shaped tan macule c/w lentigo     1-2 mm smooth white papules consistent with Milia      Movable subcutaneous cyst with punctum c/w  epidermal inclusion cyst      Subcutaneous movable cyst c/w pilar cyst      Firm pink to brown papule c/w dermatofibroma      Pedunculated fleshy papule(s) c/w skin tag(s)      Evenly pigmented macule c/w junctional nevus     Mildly variegated pigmented, slightly irregular-bordered macule c/w mildly atypical nevus      Flesh colored to evenly pigmented papule c/w intradermal nevus       Pink pearly papule/plaque c/w basal cell carcinoma      Erythematous hyperkeratotic cursted plaque c/w SCC      Surgical scar with no sign of skin cancer recurrence      Open and closed comedones      Inflammatory papules and pustules      Verrucoid papule consistent consistent with wart     Erythematous eczematous patches and plaques     Dystrophic onycholytic nail with subungual debris c/w onychomycosis     Umbilicated papule    Erythematous-base heme-crusted tan verrucoid plaque consistent with inflamed seborrheic keratosis     Erythematous Silvery Scaling Plaque c/w Psoriasis     See annotation      Assessment / Plan:      Pathology Orders:     Normal Orders This Visit    Tissue Specimen To Pathology, Dermatology     Comments:    Please disregard/cancel previous order with wrong side noted.    Questions:    Directional Terms:  Other(comment)    Clinical information:  r/o pyogenic granuloma    Specific Site:  LEFT 3rd finger        Hyperhidrosis  -     aluminum chloride (DRYSOL DAB-O-MATIC) 20 % external solution; Apply to dry skin nightly x 3 nights, then 1-2 nights per week.  Dispense: 60 mL; Refill: 3    Neoplasm of uncertain behavior of skin  -     Tissue Specimen To Pathology, Dermatology  Shave biopsy procedure note:  Risk, benefits, and alternatives of biopsy are discussed with the patient, including risk of infection, scar, recurrence, and need for additional treatment of site. The patient agrees to the procedure by verbal consent. The area is marked and prepped with alcohol.  Approximately 1 mL of lidocaine 1% with  epinephrine is used for local anesthesia. A sharp blade is used to remove the lesion. The specimen is sent for pathology. Hemostasis is obtained with aluminum chloride and monopolar hyfrecation. The area is then dressed and bandaged. The patient tolerated the procedure well without adverse event. Written instructions on wound care were given and were reviewed with the patient, who is to call for any signs of bleeding or infection. The patient will be notified of the pathology results.    Follow-up if symptoms worsen or fail to improve.

## 2018-08-20 ENCOUNTER — PATIENT MESSAGE (OUTPATIENT)
Dept: DERMATOLOGY | Facility: CLINIC | Age: 38
End: 2018-08-20

## 2018-08-24 RX ORDER — METFORMIN HYDROCHLORIDE 1000 MG/1
1000 TABLET ORAL 2 TIMES DAILY WITH MEALS
Qty: 180 TABLET | Refills: 0 | Status: SHIPPED | OUTPATIENT
Start: 2018-08-24 | End: 2019-05-15 | Stop reason: SDUPTHER

## 2018-09-21 DIAGNOSIS — Z12.39 BREAST CANCER SCREENING: ICD-10-CM

## 2018-09-25 ENCOUNTER — PATIENT MESSAGE (OUTPATIENT)
Dept: FAMILY MEDICINE | Facility: CLINIC | Age: 38
End: 2018-09-25

## 2018-09-25 DIAGNOSIS — E11.9 TYPE 2 DIABETES MELLITUS WITHOUT COMPLICATION, WITHOUT LONG-TERM CURRENT USE OF INSULIN: Primary | ICD-10-CM

## 2018-10-26 ENCOUNTER — LAB VISIT (OUTPATIENT)
Dept: LAB | Facility: HOSPITAL | Age: 38
End: 2018-10-26
Attending: INTERNAL MEDICINE
Payer: COMMERCIAL

## 2018-10-26 DIAGNOSIS — E11.9 TYPE 2 DIABETES MELLITUS WITHOUT COMPLICATION, WITHOUT LONG-TERM CURRENT USE OF INSULIN: ICD-10-CM

## 2018-10-26 DIAGNOSIS — E11.9 TYPE 2 DIABETES MELLITUS WITHOUT COMPLICATION, UNSPECIFIED WHETHER LONG TERM INSULIN USE: ICD-10-CM

## 2018-10-26 LAB
ALBUMIN SERPL BCP-MCNC: 3.5 G/DL
ALP SERPL-CCNC: 96 U/L
ALT SERPL W/O P-5'-P-CCNC: 23 U/L
ANION GAP SERPL CALC-SCNC: 7 MMOL/L
AST SERPL-CCNC: 20 U/L
BILIRUB SERPL-MCNC: 0.6 MG/DL
BUN SERPL-MCNC: 8 MG/DL
CALCIUM SERPL-MCNC: 8.8 MG/DL
CHLORIDE SERPL-SCNC: 104 MMOL/L
CHOLEST SERPL-MCNC: 205 MG/DL
CHOLEST/HDLC SERPL: 4.4 {RATIO}
CO2 SERPL-SCNC: 24 MMOL/L
CREAT SERPL-MCNC: 0.7 MG/DL
EST. GFR  (AFRICAN AMERICAN): >60 ML/MIN/1.73 M^2
EST. GFR  (NON AFRICAN AMERICAN): >60 ML/MIN/1.73 M^2
ESTIMATED AVG GLUCOSE: 269 MG/DL
GLUCOSE SERPL-MCNC: 341 MG/DL
HBA1C MFR BLD HPLC: 11 %
HDLC SERPL-MCNC: 47 MG/DL
HDLC SERPL: 22.9 %
LDLC SERPL CALC-MCNC: 139.6 MG/DL
NONHDLC SERPL-MCNC: 158 MG/DL
POTASSIUM SERPL-SCNC: 4.5 MMOL/L
PROT SERPL-MCNC: 6.9 G/DL
SODIUM SERPL-SCNC: 135 MMOL/L
TRIGL SERPL-MCNC: 92 MG/DL

## 2018-10-26 PROCEDURE — 80061 LIPID PANEL: CPT

## 2018-10-26 PROCEDURE — 80053 COMPREHEN METABOLIC PANEL: CPT

## 2018-10-26 PROCEDURE — 36415 COLL VENOUS BLD VENIPUNCTURE: CPT

## 2018-10-26 PROCEDURE — 83036 HEMOGLOBIN GLYCOSYLATED A1C: CPT

## 2018-10-30 ENCOUNTER — OFFICE VISIT (OUTPATIENT)
Dept: FAMILY MEDICINE | Facility: CLINIC | Age: 38
End: 2018-10-30
Payer: COMMERCIAL

## 2018-10-30 VITALS
HEART RATE: 92 BPM | DIASTOLIC BLOOD PRESSURE: 84 MMHG | WEIGHT: 211 LBS | TEMPERATURE: 98 F | SYSTOLIC BLOOD PRESSURE: 118 MMHG | OXYGEN SATURATION: 97 % | HEIGHT: 68 IN | BODY MASS INDEX: 31.98 KG/M2

## 2018-10-30 DIAGNOSIS — E78.2 MIXED HYPERLIPIDEMIA: ICD-10-CM

## 2018-10-30 DIAGNOSIS — L30.9 ECZEMA, UNSPECIFIED TYPE: ICD-10-CM

## 2018-10-30 DIAGNOSIS — Z23 NEED FOR HEPATITIS A IMMUNIZATION: ICD-10-CM

## 2018-10-30 DIAGNOSIS — E11.9 TYPE 2 DIABETES MELLITUS WITHOUT COMPLICATION, WITHOUT LONG-TERM CURRENT USE OF INSULIN: Primary | ICD-10-CM

## 2018-10-30 DIAGNOSIS — Z12.31 ENCOUNTER FOR SCREENING MAMMOGRAM FOR MALIGNANT NEOPLASM OF BREAST: ICD-10-CM

## 2018-10-30 DIAGNOSIS — Z23 NEED FOR VACCINATION FOR STREP PNEUMONIAE: ICD-10-CM

## 2018-10-30 PROCEDURE — 90471 IMMUNIZATION ADMIN: CPT | Mod: S$GLB,,, | Performed by: INTERNAL MEDICINE

## 2018-10-30 PROCEDURE — 99999 PR PBB SHADOW E&M-EST. PATIENT-LVL IV: CPT | Mod: PBBFAC,,, | Performed by: INTERNAL MEDICINE

## 2018-10-30 PROCEDURE — 3079F DIAST BP 80-89 MM HG: CPT | Mod: CPTII,S$GLB,, | Performed by: INTERNAL MEDICINE

## 2018-10-30 PROCEDURE — 90632 HEPA VACCINE ADULT IM: CPT | Mod: S$GLB,,, | Performed by: INTERNAL MEDICINE

## 2018-10-30 PROCEDURE — 3046F HEMOGLOBIN A1C LEVEL >9.0%: CPT | Mod: CPTII,S$GLB,, | Performed by: INTERNAL MEDICINE

## 2018-10-30 PROCEDURE — 3008F BODY MASS INDEX DOCD: CPT | Mod: CPTII,S$GLB,, | Performed by: INTERNAL MEDICINE

## 2018-10-30 PROCEDURE — 90732 PPSV23 VACC 2 YRS+ SUBQ/IM: CPT | Mod: S$GLB,,, | Performed by: INTERNAL MEDICINE

## 2018-10-30 PROCEDURE — 3074F SYST BP LT 130 MM HG: CPT | Mod: CPTII,S$GLB,, | Performed by: INTERNAL MEDICINE

## 2018-10-30 PROCEDURE — 99214 OFFICE O/P EST MOD 30 MIN: CPT | Mod: 25,S$GLB,, | Performed by: INTERNAL MEDICINE

## 2018-10-30 PROCEDURE — 90472 IMMUNIZATION ADMIN EACH ADD: CPT | Mod: S$GLB,,, | Performed by: INTERNAL MEDICINE

## 2018-10-30 RX ORDER — TRIAMCINOLONE ACETONIDE 1 MG/G
CREAM TOPICAL 2 TIMES DAILY
Qty: 80 G | Refills: 11 | Status: SHIPPED | OUTPATIENT
Start: 2018-10-30 | End: 2019-05-16

## 2018-10-30 RX ORDER — LANCETS
1 EACH MISCELLANEOUS 2 TIMES DAILY
Qty: 300 EACH | Refills: 11 | Status: ON HOLD | OUTPATIENT
Start: 2018-10-30 | End: 2019-05-17 | Stop reason: HOSPADM

## 2018-10-30 NOTE — PROGRESS NOTES
"This note was created by combination of typed  and Dragon dictation.  Transcription errors may be present.  If there are any questions, please contact me.    Assessment / Plan:   ADDENDUM  Her insurance does not cover digital DM monitoring program. So will need her to check her sugars and send me readings every few weeks.  ====================================    Type 2 diabetes mellitus without complication, without long-term current use of insulin  -history of insulin with significant weight gain.  On max dose metformin.  I will rechallenge on Trulicity, start with 0.75 for 1 month and then increase to 1.5.  Previous barrier was non formulary.  If not covered, would try GERD and.  Side effect profile discussed.  If also not covered would consider glipizide.  We did discuss that these medications may lower her A1c by maybe 1 point and she may need to take multiple medications to get her sugars down.  I have asked her to check her sugars in the morning.  I would like to try and enroll her with the digital diabetes monitoring program.  -     lancets Misc; 1 Device by Misc.(Non-Drug; Combo Route) route 2 (two) times daily. Tests 3 times daily  Dispense: 300 each; Refill: 11  -     dulaglutide (TRULICITY) 0.75 mg/0.5 mL PnIj; Inject 0.5 mLs (0.75 mg total) into the skin every 7 days. After 1 month go up to 1.5  Dispense: 3 mL; Refill: 0  -     needle, disp, 31 gauge 31 gauge x 5/16" Ndle; 1 each by Misc.(Non-Drug; Combo Route) route once daily.  Dispense: 100 each; Refill: 1  -     dulaglutide (TRULICITY) 1.5 mg/0.5 mL PnIj; Inject 1.5 mg into the skin every 7 days.  Dispense: 3 mL; Refill: 11    Eczema, unspecified type  -of the hands, trial of topical triamcinolone.  She can continue using her over-the-counter moisturizers.  -     triamcinolone acetonide 0.1% (KENALOG) 0.1 % cream; Apply topically 2 (two) times daily. for 14 days  Dispense: 80 g; Refill: 11    Mixed hyperlipidemia  -she would like a " "concerted efforts at diet and exercise modification.  We talked about statin benefits.  Consider Lipitor 40 if not to goal.  I would like to see her LDL less than 70 as threshold.    Encounter for screening mammogram for malignant neoplasm of breast  -mammogram ordered, she would like to wait until the beginning of the year as she will be transitioning to her 's insurance at that time.  -     Mammo Digital Screening Bilat; Future; Expected date: 10/30/2018    Need for vaccination for Strep pneumoniae  -Pneumovax today  -     (In Office Administered) Pneumococcal Polysaccharide Vaccine (23 Valent) (SQ/IM)    Need for hepatitis A immunization  -hepatitis a #2 today  -     (In Office Administered) Hepatitis A Vaccine (Adult) (IM)    Medications Discontinued During This Encounter   Medication Reason    BLOOD-GLUCOSE METER (ONE TOUCH BASIC SYSTEM MISC)     insulin syringe-needle U-100 (BD INSULIN SYRINGE ULTRA-FINE) 0.3 mL 31 gauge x 5/16 Syrg     lancets Misc Reorder       meds sent this encounter:  Medications Ordered This Encounter   Medications    dulaglutide (TRULICITY) 0.75 mg/0.5 mL PnIj     Sig: Inject 0.5 mLs (0.75 mg total) into the skin every 7 days. After 1 month go up to 1.5     Dispense:  3 mL     Refill:  0    dulaglutide (TRULICITY) 1.5 mg/0.5 mL PnIj     Sig: Inject 1.5 mg into the skin every 7 days.     Dispense:  3 mL     Refill:  11    lancets Misc     Si Device by Misc.(Non-Drug; Combo Route) route 2 (two) times daily. Tests 3 times daily     Dispense:  300 each     Refill:  11    needle, disp, 31 gauge 31 gauge x 5/16" Ndle     Si each by Misc.(Non-Drug; Combo Route) route once daily.     Dispense:  100 each     Refill:  1    triamcinolone acetonide 0.1% (KENALOG) 0.1 % cream     Sig: Apply topically 2 (two) times daily. for 14 days     Dispense:  80 g     Refill:  11       Follow Up: No Follow-up on file.  Follow-up 3 months, check urine microalbumin, A1c, TSH at that time.  " Plan to repeat labs for lipid profile in 6 months.      Subjective:     Chief Complaint   Patient presents with    Dry Skin       HPI  Tonya is a 38 y.o. female, last appointment with this clinic was 8/7/2018.    No LMP recorded. Patient is not currently having periods (Reason: Birth Control).    DM2; victoza too expensive (formulary change); Trulicity nonformulary; hx of tresiba  VERA  Obesity  Hx migraines  Hx of CKD    previsit labs a1c high.  Lipid high.    Comes in today complaining of extremely dry hands.  She knows that this is probably worsened by the hand  but she has tried all known available over-the-counter treatments including Vaseline, moisturizers and wearing gloves to bed, given tried Neosporin, still with very dry and cracked skin.  Surrounding the nail beds.    She saw her lab results prior to coming in.  Is disappointed about her high A1c.  Recalls having had an A1c drawn maybe around May of last year but never got the results of that from her specialist.  The only want to have to compare it was last year and it was about the same.  She is taking metformin max dose.  History of prescription for Trulicity but it was non formulary and not covered.  She never took it.    Recalls a history of insulin use with side effect of significant weight gain and she would like to avoid that.    She had an eye exam not too long ago with Dr. Neri Peña.    Her cholesterol was high.  We talked about benefits of statin therapy in diabetics.  She would like a concerted efforts at diet and physical activity modification for the next 6 months.  We talked about benefits of plant based diet, to limit animal proteins and red meat as these can contribute to LDL as well as diabetes control.    She needs a refill on her testing supplies.    Answers for HPI/ROS submitted by the patient on 10/23/2018   Diabetes problem  Diabetes type: type 2  MedicAlert ID: No  Disease duration: 12 years  fatigue: Yes  foot  paresthesias: No  foot ulcerations: No  polyphagia: No  polyuria: Yes  visual change: No  Symptom course: stable  confusion: No  hunger: No  mood changes: No  pallor: No  sleepiness: No  speech difficulty: No  sweats: No  blackouts: No  hospitalization: No  nocturnal hypoglycemia: No  required assistance: No  required glucagon: No  CVA: No  heart disease: No  impotence: No  nephropathy: No  peripheral neuropathy: No  PVD: No  retinopathy: No  autonomic neuropathy: No  CAD risks: family history, stress, diabetes mellitus  Current treatments: diet, oral agent (monotherapy)  Treatment compliance: some of the time  Given by: patient  Home blood tests: 3-4 x per week  Monitoring compliance: fair  Blood glucose trend: increasing steadily  Weight trend: increasing steadily  Current diet: diabetic, generally healthy  Meal planning: avoidance of concentrated sweets, carbohydrate counting  Exercise: every other day  Dietitian visit: No  Eye exam current: Yes  Sees podiatrist: No    Patient Care Team:  Hernando Duncan MD as PCP - General (Internal Medicine)  Amee Stiles MD as Consulting Physician (Endocrinology)  Neri Peña (Optometry)    Patient Active Problem List    Diagnosis Date Noted    Type 2 diabetes mellitus 2016    Obesity 2013       PAST MEDICAL HISTORY:  Past Medical History:   Diagnosis Date    Chronic kidney disease     Diabetes mellitus type II     Herpes simplex virus (HSV) infection     History of migraine headaches     Kidney stones     Obesity 2013    VERA (obstructive sleep apnea)        PAST SURGICAL HISTORY:  Past Surgical History:   Procedure Laterality Date     SECTION      x3    EXTRACORPOREAL SHOCK WAVE LITHOTRIPSY      LAPAROSCOPIC GASTRIC BANDING         SOCIAL HISTORY:  Social History     Socioeconomic History    Marital status:      Spouse name: Not on file    Number of children: Not on file    Years of education: Not on file    Highest  "education level: Not on file   Social Needs    Financial resource strain: Not on file    Food insecurity - worry: Not on file    Food insecurity - inability: Not on file    Transportation needs - medical: Not on file    Transportation needs - non-medical: Not on file   Occupational History    Not on file   Tobacco Use    Smoking status: Never Smoker    Smokeless tobacco: Never Used   Substance and Sexual Activity    Alcohol use: No     Comment: social    Drug use: No    Sexual activity: Yes     Partners: Male     Birth control/protection: IUD   Other Topics Concern    Are you pregnant or think you may be? No    Breast-feeding No   Social History Narrative    Not on file       ALLERGIES AND MEDICATIONS: updated and reviewed.  Review of patient's allergies indicates:  No Known Allergies  Current Outpatient Medications   Medication Sig Dispense Refill    aluminum chloride (DRYSOL DAB-O-MATIC) 20 % external solution Apply to dry skin nightly x 3 nights, then 1-2 nights per week. 60 mL 3    BD INSULIN SYRINGE ULTRA-FINE 1/2 mL 31 x 5/16" Syrg   2    blood sugar diagnostic Strp Checks 3 times daily for Relion Prime meter 100 each 12    fluconazole (DIFLUCAN) 150 MG Tab TAKE 1 TABLET BY MOUTH ONCE AND MAY RETREAT IN 3 DAYS IF STILL SYMPTOMATIC 2 tablet 4    lancets Misc 1 Device by Misc.(Non-Drug; Combo Route) route 2 (two) times daily. Patient has Relion Prime meter.  Tests 3 times daily      metFORMIN (GLUCOPHAGE) 1000 MG tablet TAKE 1 TABLET (1,000 MG TOTAL) BY MOUTH 2 (TWO) TIMES DAILY WITH MEALS. 180 tablet 0    pen needle, diabetic (NOVOTWIST) 32 gauge x 1/5" Ndle Check blood sugar  each 3     No current facility-administered medications for this visit.        Review of Systems   Constitutional: Negative for weight loss.   Eyes: Negative for blurred vision.   Cardiovascular: Negative for chest pain.   Neurological: Negative for dizziness, tremors, seizures, weakness and headaches. " "  Endo/Heme/Allergies: Positive for polydipsia.   Psychiatric/Behavioral: The patient is not nervous/anxious.        Objective:   Physical Exam   Vitals:    10/30/18 1611   BP: 118/84   BP Location: Right arm   Patient Position: Sitting   BP Method: Medium (Manual)   Pulse: 92   Temp: 98.3 °F (36.8 °C)   TempSrc: Oral   SpO2: 97%   Weight: 95.7 kg (210 lb 15.7 oz)   Height: 5' 8" (1.727 m)    Body mass index is 32.08 kg/m².  Weight: 95.7 kg (210 lb 15.7 oz)   Height: 5' 8" (172.7 cm)     Physical Exam   Constitutional: She is oriented to person, place, and time. She appears well-developed and well-nourished. No distress.   Eyes: EOM are normal.   Cardiovascular: Normal rate, regular rhythm and normal heart sounds.   No murmur heard.  Pulmonary/Chest: Effort normal and breath sounds normal.   Musculoskeletal: Normal range of motion. She exhibits no edema.   Neurological: She is alert and oriented to person, place, and time. Coordination normal.   Skin: Skin is warm and dry.   Surrounding her nail beds of numerous fingers with dry chapped cracked skin without erythema, no drainage   Psychiatric: She has a normal mood and affect. Her behavior is normal. Thought content normal.        Component      Latest Ref Rng & Units 10/26/2018   Sodium      136 - 145 mmol/L 135 (L)   Potassium      3.5 - 5.1 mmol/L 4.5   Chloride      95 - 110 mmol/L 104   CO2      23 - 29 mmol/L 24   Glucose      70 - 110 mg/dL 341 (H)   BUN, Bld      6 - 20 mg/dL 8   Creatinine      0.5 - 1.4 mg/dL 0.7   Calcium      8.7 - 10.5 mg/dL 8.8   Total Protein      6.0 - 8.4 g/dL 6.9   Albumin      3.5 - 5.2 g/dL 3.5   Total Bilirubin      0.1 - 1.0 mg/dL 0.6   Alkaline Phosphatase      55 - 135 U/L 96   AST      10 - 40 U/L 20   ALT      10 - 44 U/L 23   Anion Gap      8 - 16 mmol/L 7 (L)   eGFR if African American      >60 mL/min/1.73 m:2 >60   eGFR if non African American      >60 mL/min/1.73 m:2 >60   Cholesterol      120 - 199 mg/dL 205 (H) "   Triglycerides      30 - 150 mg/dL 92   HDL      40 - 75 mg/dL 47   LDL Cholesterol      63.0 - 159.0 mg/dL 139.6   HDL/Chol Ratio      20.0 - 50.0 % 22.9   Total Cholesterol/HDL Ratio      2.0 - 5.0 4.4   Non-HDL Cholesterol      mg/dL 158   Hemoglobin A1C      4.0 - 5.6 % 11.0 (H)   Estimated Avg Glucose      68 - 131 mg/dL 269 (H)

## 2018-10-30 NOTE — PROGRESS NOTES
Patient received Pneumococcal 23 vaccine and Hep A tolerated it well. Patient received VIS information.

## 2018-11-05 ENCOUNTER — PATIENT MESSAGE (OUTPATIENT)
Dept: FAMILY MEDICINE | Facility: CLINIC | Age: 38
End: 2018-11-05

## 2018-11-20 ENCOUNTER — PATIENT MESSAGE (OUTPATIENT)
Dept: FAMILY MEDICINE | Facility: CLINIC | Age: 38
End: 2018-11-20

## 2018-11-21 ENCOUNTER — PATIENT MESSAGE (OUTPATIENT)
Dept: FAMILY MEDICINE | Facility: CLINIC | Age: 38
End: 2018-11-21

## 2018-11-21 DIAGNOSIS — L98.9 SKIN LESION: Primary | ICD-10-CM

## 2018-11-24 DIAGNOSIS — E11.9 TYPE 2 DIABETES MELLITUS WITHOUT COMPLICATION, WITHOUT LONG-TERM CURRENT USE OF INSULIN: ICD-10-CM

## 2018-11-25 RX ORDER — DULAGLUTIDE 0.75 MG/.5ML
INJECTION, SOLUTION SUBCUTANEOUS
Qty: 2 SYRINGE | Refills: 0 | Status: SHIPPED | OUTPATIENT
Start: 2018-11-25 | End: 2018-12-07 | Stop reason: ALTCHOICE

## 2018-12-04 ENCOUNTER — OFFICE VISIT (OUTPATIENT)
Dept: DERMATOLOGY | Facility: CLINIC | Age: 38
End: 2018-12-04
Payer: COMMERCIAL

## 2018-12-04 DIAGNOSIS — Z13.21 ENCOUNTER FOR VITAMIN DEFICIENCY SCREENING: ICD-10-CM

## 2018-12-04 DIAGNOSIS — B35.1 ONYCHOMYCOSIS: Primary | ICD-10-CM

## 2018-12-04 DIAGNOSIS — L65.9 ALOPECIA OF SCALP: ICD-10-CM

## 2018-12-04 DIAGNOSIS — Z13.29 SCREENING FOR THYROID DISORDER: ICD-10-CM

## 2018-12-04 PROCEDURE — 87220 TISSUE EXAM FOR FUNGI: CPT | Mod: S$GLB,,, | Performed by: DERMATOLOGY

## 2018-12-04 PROCEDURE — 99214 OFFICE O/P EST MOD 30 MIN: CPT | Mod: 25,S$GLB,, | Performed by: DERMATOLOGY

## 2018-12-04 RX ORDER — TERBINAFINE HYDROCHLORIDE 250 MG/1
250 TABLET ORAL DAILY
Qty: 30 TABLET | Refills: 0 | Status: SHIPPED | OUTPATIENT
Start: 2018-12-04 | End: 2019-01-03

## 2018-12-04 NOTE — PROGRESS NOTES
"  Subjective:       Patient ID:  Tonya Rivas is a 38 y.o. female who presents for   Chief Complaint   Patient presents with    Nail Problem     1 motnh ago, new salon     Hair Loss     2 years, much worse over the past 3 motnhs, metformin for 12 years     Pt is here today with a c/o of nail problem that she has been experiencing for about 1 month. Pt states that she switched nail salons and the new person noticed the problem.  Prior to that she hardly ever saw her nails without polish on them. Pt also complains that her hair seems to be getting thinner over the past three years, but she has noticed an excess of hair loss over the past 3 months. Pt states that she has been on metformin for about 12 years.       Nail Problem  - Initial  Affected locations: right fingers and left fingers  Duration: 1 month  Signs / symptoms: scaling ("nail is dying")  Severity: mild  Timing: constant  Aggravated by: nothing  Relieving factors/Treatments tried: nothing    Hair Loss  - Initial  Affected locations: scalp (everywhere)  Duration: 3 months  Signs and Symptoms: hair loss.  Severity: mild  Timing: constant  Aggravated by: nothing  Relieving factors/Treatments tried: nothing  Improvement on treatment: no relief      Review of Systems   Constitutional: Negative for fatigue and malaise.   Skin: Negative for itching and rash.   Endocrine: Negative for cold intolerance and heat intolerance.        Objective:    Physical Exam   Constitutional: She appears well-developed and well-nourished. No distress.   Neurological: She is alert and oriented to person, place, and time. She is not disoriented.   Psychiatric: She has a normal mood and affect.   Skin:   Areas Examined (abnormalities noted in diagram):   Scalp / Hair Palpated and Inspected  RUE Inspected  LUE Inspection Performed  Nails and Digits Inspection Performed                  Diagram Legend     Erythematous scaling macule/papule c/w actinic keratosis       Vascular papule " c/w angioma      Pigmented verrucoid papule/plaque c/w seborrheic keratosis      Yellow umbilicated papule c/w sebaceous hyperplasia      Irregularly shaped tan macule c/w lentigo     1-2 mm smooth white papules consistent with Milia      Movable subcutaneous cyst with punctum c/w epidermal inclusion cyst      Subcutaneous movable cyst c/w pilar cyst      Firm pink to brown papule c/w dermatofibroma      Pedunculated fleshy papule(s) c/w skin tag(s)      Evenly pigmented macule c/w junctional nevus     Mildly variegated pigmented, slightly irregular-bordered macule c/w mildly atypical nevus      Flesh colored to evenly pigmented papule c/w intradermal nevus       Pink pearly papule/plaque c/w basal cell carcinoma      Erythematous hyperkeratotic cursted plaque c/w SCC      Surgical scar with no sign of skin cancer recurrence      Open and closed comedones      Inflammatory papules and pustules      Verrucoid papule consistent consistent with wart     Erythematous eczematous patches and plaques     Dystrophic onycholytic nail with subungual debris c/w onychomycosis     Umbilicated papule    Erythematous-base heme-crusted tan verrucoid plaque consistent with inflamed seborrheic keratosis     Erythematous Silvery Scaling Plaque c/w Psoriasis     See annotation    CMP  Sodium   Date Value Ref Range Status   10/26/2018 135 (L) 136 - 145 mmol/L Final     Potassium   Date Value Ref Range Status   10/26/2018 4.5 3.5 - 5.1 mmol/L Final     Chloride   Date Value Ref Range Status   10/26/2018 104 95 - 110 mmol/L Final     CO2   Date Value Ref Range Status   10/26/2018 24 23 - 29 mmol/L Final     Glucose   Date Value Ref Range Status   10/26/2018 341 (H) 70 - 110 mg/dL Final     BUN, Bld   Date Value Ref Range Status   10/26/2018 8 6 - 20 mg/dL Final     Creatinine   Date Value Ref Range Status   10/26/2018 0.7 0.5 - 1.4 mg/dL Final     Calcium   Date Value Ref Range Status   10/26/2018 8.8 8.7 - 10.5 mg/dL Final     Total  Protein   Date Value Ref Range Status   10/26/2018 6.9 6.0 - 8.4 g/dL Final     Albumin   Date Value Ref Range Status   10/26/2018 3.5 3.5 - 5.2 g/dL Final     Total Bilirubin   Date Value Ref Range Status   10/26/2018 0.6 0.1 - 1.0 mg/dL Final     Comment:     For infants and newborns, interpretation of results should be based  on gestational age, weight and in agreement with clinical  observations.  Premature Infant recommended reference ranges:  Up to 24 hours.............<8.0 mg/dL  Up to 48 hours............<12.0 mg/dL  3-5 days..................<15.0 mg/dL  6-29 days.................<15.0 mg/dL       Alkaline Phosphatase   Date Value Ref Range Status   10/26/2018 96 55 - 135 U/L Final     AST   Date Value Ref Range Status   10/26/2018 20 10 - 40 U/L Final     ALT   Date Value Ref Range Status   10/26/2018 23 10 - 44 U/L Final     Anion Gap   Date Value Ref Range Status   10/26/2018 7 (L) 8 - 16 mmol/L Final     eGFR if    Date Value Ref Range Status   10/26/2018 >60 >60 mL/min/1.73 m^2 Final     eGFR if non    Date Value Ref Range Status   10/26/2018 >60 >60 mL/min/1.73 m^2 Final     Comment:     Calculation used to obtain the estimated glomerular filtration  rate (eGFR) is the CKD-EPI equation.            Assessment / Plan:        Onychomycosis  -  Discussed treatment options with the patient, including risks, benefits, and side effects of oral Lamisil (75% of patients clear but 50% recur within 2 years, hepatotoxicity) vs. topical treatments. Discussed common side effects may include headache, skin rash, GI upset, and taste disturbances. Rare side effects may include severe skin rash or hepatitis.  Discussed with patient that condition may recur after clearance with the medication. Patient elected to proceed with oral Lamisil treatment.     Lamisil may interfere with certain medications including tricyclic antidepressants, cimetidine, rifampin. Avoid tylenol and alcohol intake  while on Lamisil.    Will take as pulse dose 1 week per month x 4 months to minimize exposure to/side effects of the medication. Avoid drinking alcohol while on Lamisil.    -  Patient is not actively trying to become pregnant, but had unprotected sex with her  within the last week. She will hold all meds discussed today for a few weeks and confirm with a negative pregnancy test prior to starting them.    -     terbinafine HCl (LAMISIL) 250 mg tablet; Take 1 tablet (250 mg total) by mouth once daily.  Dispense: 30 tablet; Refill: 0    Alopecia of scalp  Follicular miniaturization at crown is most consistent with female pattern baldness, but will rule out other causes.  If she confirms that she is not pregnant, recommended a trial of topical minoxidil (Rogaine). Must use for at least 6 months to see full effect. Counseled patient that if regrowth occurs, they must continue using it as directed or the new growth will fall out. Only apply to areas on scalp with hair loss, and discontinue if redness/irritation occurs.    Will check the following labs to address any underlying causes of alopecia.  -     CBC auto differential; Future  -     T4, free; Future  -     T3, free; Future  -     Ferritin; Future  -     Vitamin D; Future  -     Testosterone, free; Future  -     DHEA-sulfate; Future  -     Testosterone; Future  -     Dihydrotestosterone; Future  -     Vitamin A; Future    Screening for thyroid disorder  -     T4, free; Future  -     T3, free; Future    Encounter for vitamin deficiency screening  -     Vitamin D; Future  -     Vitamin A; Future    Follow-up in about 3 months (around 3/4/2019) for or sooner if any new problems or changing lesions.

## 2018-12-04 NOTE — LETTER
December 4, 2018      Gatito Macdonald MD  [address] [place] [address]           [facility] - Dermatology  [address] [place] [id number]  Phone: [phone]  Fax: [phone]          Patient: Tonya Rivas   MR Number: [id number]   YOB: 1980   Date of Visit: 12/4/2018       Dear Dr. Gatito Macdonald:    Thank you for referring Tonya Rivas to me for evaluation. Attached you will find relevant portions of my assessment and plan of care.    If you have questions, please do not hesitate to call me. I look forward to following Tonya Rivas along with you.    Sincerely,    Obdulia Her MD    Enclosure  CC:  No Recipients    If you would like to receive this communication electronically, please contact [email or web address] or [phone] to request more information on [employer] access.    For providers and/or their staff who would like to refer a patient to [facility], please contact us through our one-stop-shop provider referral line, [facility], at [phone].    If you feel you have received this communication in error or would no longer like to receive these types of communications, please e-mail [email or web address]

## 2018-12-06 ENCOUNTER — LAB VISIT (OUTPATIENT)
Dept: LAB | Facility: HOSPITAL | Age: 38
End: 2018-12-06
Attending: DERMATOLOGY
Payer: COMMERCIAL

## 2018-12-06 DIAGNOSIS — Z13.21 ENCOUNTER FOR VITAMIN DEFICIENCY SCREENING: ICD-10-CM

## 2018-12-06 DIAGNOSIS — L65.9 ALOPECIA OF SCALP: ICD-10-CM

## 2018-12-06 DIAGNOSIS — Z13.29 SCREENING FOR THYROID DISORDER: ICD-10-CM

## 2018-12-06 LAB
25(OH)D3+25(OH)D2 SERPL-MCNC: 21 NG/ML
BASOPHILS # BLD AUTO: 0.04 K/UL
BASOPHILS NFR BLD: 0.8 %
DHEA-S SERPL-MCNC: 294.8 UG/DL
DIFFERENTIAL METHOD: NORMAL
EOSINOPHIL # BLD AUTO: 0.1 K/UL
EOSINOPHIL NFR BLD: 1 %
ERYTHROCYTE [DISTWIDTH] IN BLOOD BY AUTOMATED COUNT: 12.2 %
FERRITIN SERPL-MCNC: 66 NG/ML
HCT VFR BLD AUTO: 44.3 %
HGB BLD-MCNC: 14.6 G/DL
IMM GRANULOCYTES # BLD AUTO: 0.02 K/UL
IMM GRANULOCYTES NFR BLD AUTO: 0.4 %
LYMPHOCYTES # BLD AUTO: 1.8 K/UL
LYMPHOCYTES NFR BLD: 35.2 %
MCH RBC QN AUTO: 28.3 PG
MCHC RBC AUTO-ENTMCNC: 33 G/DL
MCV RBC AUTO: 86 FL
MONOCYTES # BLD AUTO: 0.3 K/UL
MONOCYTES NFR BLD: 6.1 %
NEUTROPHILS # BLD AUTO: 2.9 K/UL
NEUTROPHILS NFR BLD: 56.5 %
NRBC BLD-RTO: 0 /100 WBC
PLATELET # BLD AUTO: 303 K/UL
PMV BLD AUTO: 10.1 FL
RBC # BLD AUTO: 5.15 M/UL
T3FREE SERPL-MCNC: 3.3 PG/ML
T4 FREE SERPL-MCNC: 1.09 NG/DL
TESTOST SERPL-MCNC: 27 NG/DL
WBC # BLD AUTO: 5.06 K/UL

## 2018-12-06 PROCEDURE — 82306 VITAMIN D 25 HYDROXY: CPT

## 2018-12-06 PROCEDURE — 84481 FREE ASSAY (FT-3): CPT

## 2018-12-06 PROCEDURE — 84590 ASSAY OF VITAMIN A: CPT

## 2018-12-06 PROCEDURE — 36415 COLL VENOUS BLD VENIPUNCTURE: CPT | Mod: PO

## 2018-12-06 PROCEDURE — 85025 COMPLETE CBC W/AUTO DIFF WBC: CPT

## 2018-12-06 PROCEDURE — 84403 ASSAY OF TOTAL TESTOSTERONE: CPT

## 2018-12-06 PROCEDURE — 82728 ASSAY OF FERRITIN: CPT

## 2018-12-06 PROCEDURE — 80327 ANABOLIC STEROID 1 OR 2: CPT

## 2018-12-06 PROCEDURE — 84402 ASSAY OF FREE TESTOSTERONE: CPT

## 2018-12-06 PROCEDURE — 82627 DEHYDROEPIANDROSTERONE: CPT

## 2018-12-06 PROCEDURE — 84439 ASSAY OF FREE THYROXINE: CPT

## 2018-12-07 ENCOUNTER — PATIENT MESSAGE (OUTPATIENT)
Dept: FAMILY MEDICINE | Facility: CLINIC | Age: 38
End: 2018-12-07

## 2018-12-07 DIAGNOSIS — E11.9 TYPE 2 DIABETES MELLITUS WITHOUT COMPLICATION, WITHOUT LONG-TERM CURRENT USE OF INSULIN: ICD-10-CM

## 2018-12-10 LAB — TESTOST FREE SERPL-MCNC: 1.8 PG/ML

## 2018-12-11 DIAGNOSIS — E55.9 VITAMIN D DEFICIENCY: Primary | ICD-10-CM

## 2018-12-11 LAB
ANDROSTANOLONE SERPL-MCNC: 112 PG/ML
VIT A SERPL-MCNC: 46 UG/DL (ref 38–106)

## 2018-12-23 DIAGNOSIS — E11.9 TYPE 2 DIABETES MELLITUS WITHOUT COMPLICATION, WITHOUT LONG-TERM CURRENT USE OF INSULIN: ICD-10-CM

## 2018-12-23 RX ORDER — DULAGLUTIDE 0.75 MG/.5ML
INJECTION, SOLUTION SUBCUTANEOUS
Qty: 2 SYRINGE | Refills: 0 | OUTPATIENT
Start: 2018-12-23

## 2018-12-24 DIAGNOSIS — E11.9 TYPE 2 DIABETES MELLITUS WITHOUT COMPLICATION, WITHOUT LONG-TERM CURRENT USE OF INSULIN: ICD-10-CM

## 2019-01-26 DIAGNOSIS — B35.1 ONYCHOMYCOSIS: ICD-10-CM

## 2019-01-28 RX ORDER — TERBINAFINE HYDROCHLORIDE 250 MG/1
250 TABLET ORAL DAILY
Qty: 30 TABLET | Refills: 0 | OUTPATIENT
Start: 2019-01-28 | End: 2019-02-27

## 2019-03-04 ENCOUNTER — PATIENT MESSAGE (OUTPATIENT)
Dept: FAMILY MEDICINE | Facility: CLINIC | Age: 39
End: 2019-03-04

## 2019-03-04 DIAGNOSIS — B37.31 VAGINAL CANDIDIASIS: Primary | ICD-10-CM

## 2019-03-04 DIAGNOSIS — N89.8 VAGINAL DISCHARGE: ICD-10-CM

## 2019-03-04 RX ORDER — FLUCONAZOLE 150 MG/1
TABLET ORAL
Qty: 2 TABLET | Refills: 2 | Status: CANCELLED | OUTPATIENT
Start: 2019-03-04

## 2019-03-04 RX ORDER — FLUCONAZOLE 150 MG/1
TABLET ORAL
Qty: 2 TABLET | Refills: 0 | Status: SHIPPED | OUTPATIENT
Start: 2019-03-04 | End: 2019-05-16

## 2019-03-12 ENCOUNTER — OFFICE VISIT (OUTPATIENT)
Dept: OBSTETRICS AND GYNECOLOGY | Facility: CLINIC | Age: 39
End: 2019-03-12
Payer: COMMERCIAL

## 2019-03-12 ENCOUNTER — HOSPITAL ENCOUNTER (OUTPATIENT)
Dept: RADIOLOGY | Facility: HOSPITAL | Age: 39
Discharge: HOME | End: 2019-03-12
Attending: NURSE PRACTITIONER
Payer: COMMERCIAL

## 2019-03-12 ENCOUNTER — TELEPHONE (OUTPATIENT)
Dept: OBSTETRICS AND GYNECOLOGY | Facility: CLINIC | Age: 39
End: 2019-03-12

## 2019-03-12 ENCOUNTER — OFFICE VISIT (OUTPATIENT)
Dept: URGENT CARE | Facility: CLINIC | Age: 39
End: 2019-03-12
Payer: COMMERCIAL

## 2019-03-12 VITALS
DIASTOLIC BLOOD PRESSURE: 70 MMHG | TEMPERATURE: 97 F | SYSTOLIC BLOOD PRESSURE: 128 MMHG | OXYGEN SATURATION: 98 % | HEIGHT: 68 IN | HEART RATE: 64 BPM | WEIGHT: 210 LBS | BODY MASS INDEX: 31.83 KG/M2

## 2019-03-12 VITALS
SYSTOLIC BLOOD PRESSURE: 122 MMHG | HEIGHT: 68 IN | WEIGHT: 209 LBS | BODY MASS INDEX: 31.67 KG/M2 | DIASTOLIC BLOOD PRESSURE: 82 MMHG

## 2019-03-12 DIAGNOSIS — Z12.39 BREAST CANCER SCREENING: ICD-10-CM

## 2019-03-12 DIAGNOSIS — N95.1 PERIMENOPAUSE: ICD-10-CM

## 2019-03-12 DIAGNOSIS — S99.922A FOOT TRAUMA, LEFT, INITIAL ENCOUNTER: ICD-10-CM
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
## 2019-03-12 DIAGNOSIS — B37.31 VAGINAL CANDIDIASIS: ICD-10-CM

## 2019-03-12 DIAGNOSIS — Z31.69 ENCOUNTER FOR PRECONCEPTION CONSULTATION: ICD-10-CM

## 2019-03-12 DIAGNOSIS — Z01.419 WELL WOMAN EXAM WITH ROUTINE GYNECOLOGICAL EXAM: Primary | ICD-10-CM

## 2019-03-12 LAB
B-HCG UR QL: NEGATIVE
CTP QC/QA: YES

## 2019-03-12 PROCEDURE — 99213 PR OFFICE/OUTPT VISIT, EST, LEVL III, 20-29 MIN: ICD-10-PCS | Mod: S$GLB,,, | Performed by: NURSE PRACTITIONER

## 2019-03-12 PROCEDURE — 81025 POCT URINE PREGNANCY: ICD-10-PCS | Mod: S$GLB,,, | Performed by: NURSE PRACTITIONER

## 2019-03-12 PROCEDURE — 3079F PR MOST RECENT DIASTOLIC BLOOD PRESSURE 80-89 MM HG: ICD-10-PCS | Mod: CPTII,S$GLB,, | Performed by: NURSE PRACTITIONER

## 2019-03-12 PROCEDURE — 3079F DIAST BP 80-89 MM HG: CPT | Mod: CPTII,S$GLB,, | Performed by: NURSE PRACTITIONER

## 2019-03-12 PROCEDURE — 88175 CYTOPATH C/V AUTO FLUID REDO: CPT

## 2019-03-12 PROCEDURE — 99396 PREV VISIT EST AGE 40-64: CPT | Mod: S$GLB,,, | Performed by: NURSE PRACTITIONER

## 2019-03-12 PROCEDURE — 99396 PR PREVENTIVE VISIT,EST,40-64: ICD-10-PCS | Mod: S$GLB,,, | Performed by: NURSE PRACTITIONER

## 2019-03-12 PROCEDURE — 99213 OFFICE O/P EST LOW 20 MIN: CPT | Mod: S$GLB,,, | Performed by: NURSE PRACTITIONER

## 2019-03-12 PROCEDURE — 73630 X-RAY EXAM OF FOOT: CPT | Mod: LT,S$GLB,, | Performed by: RADIOLOGY

## 2019-03-12 PROCEDURE — 81025 URINE PREGNANCY TEST: CPT | Mod: S$GLB,,, | Performed by: NURSE PRACTITIONER

## 2019-03-12 PROCEDURE — 77067 SCR MAMMO BI INCL CAD: CPT | Mod: 26,,, | Performed by: RADIOLOGY

## 2019-03-12 PROCEDURE — 99999 PR PBB SHADOW E&M-EST. PATIENT-LVL III: CPT | Mod: PBBFAC,,, | Performed by: NURSE PRACTITIONER

## 2019-03-12 PROCEDURE — 3074F SYST BP LT 130 MM HG: CPT | Mod: CPTII,S$GLB,, | Performed by: NURSE PRACTITIONER

## 2019-03-12 PROCEDURE — 73630 XR FOOT COMPLETE 3 VIEW LEFT: ICD-10-PCS | Mod: LT,S$GLB,, | Performed by: RADIOLOGY

## 2019-03-12 PROCEDURE — 77067 MAMMO DIGITAL SCREENING BILAT WITH TOMOSYNTHESIS_CAD: ICD-10-PCS | Mod: 26,,, | Performed by: RADIOLOGY

## 2019-03-12 PROCEDURE — 99999 PR PBB SHADOW E&M-EST. PATIENT-LVL III: ICD-10-PCS | Mod: PBBFAC,,, | Performed by: NURSE PRACTITIONER

## 2019-03-12 PROCEDURE — 77067 SCR MAMMO BI INCL CAD: CPT | Mod: TC

## 2019-03-12 PROCEDURE — 3078F DIAST BP <80 MM HG: CPT | Mod: CPTII,S$GLB,, | Performed by: NURSE PRACTITIONER

## 2019-03-12 PROCEDURE — 3074F PR MOST RECENT SYSTOLIC BLOOD PRESSURE < 130 MM HG: ICD-10-PCS | Mod: CPTII,S$GLB,, | Performed by: NURSE PRACTITIONER

## 2019-03-12 PROCEDURE — 77063 BREAST TOMOSYNTHESIS BI: CPT | Mod: 26,,, | Performed by: RADIOLOGY

## 2019-03-12 PROCEDURE — 77063 MAMMO DIGITAL SCREENING BILAT WITH TOMOSYNTHESIS_CAD: ICD-10-PCS | Mod: 26,,, | Performed by: RADIOLOGY

## 2019-03-12 PROCEDURE — 3078F PR MOST RECENT DIASTOLIC BLOOD PRESSURE < 80 MM HG: ICD-10-PCS | Mod: CPTII,S$GLB,, | Performed by: NURSE PRACTITIONER

## 2019-03-12 RX ORDER — FLUCONAZOLE 150 MG/1
TABLET ORAL
Qty: 2 TABLET | Refills: 4 | Status: SHIPPED | OUTPATIENT
Start: 2019-03-12 | End: 2019-05-16

## 2019-03-12 RX ORDER — DULAGLUTIDE 1.5 MG/.5ML
INJECTION, SOLUTION SUBCUTANEOUS
Refills: 11 | Status: ON HOLD | COMMUNITY
Start: 2019-03-01 | End: 2019-05-17 | Stop reason: HOSPADM

## 2019-03-12 NOTE — PROGRESS NOTES
"Subjective:       Patient ID: Tonya Rivas is a 38 y.o. female.    Vitals:  height is 5' 8" (1.727 m) and weight is 95.3 kg (210 lb). Her temperature is 97.1 °F (36.2 °C). Her blood pressure is 128/70 and her pulse is 64. Her oxygen saturation is 98%.     Chief Complaint: Ankle Injury (left ankle)    Pt reports last week while on a float she twisted her left ankle.  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       Ankle Injury    The incident occurred 5 to 7 days ago. The injury mechanism was a twisting injury. The pain is present in the left ankle (left ankle). The pain is at a severity of 8/10. The pain is moderate. The pain has been worsening since onset. Pertinent negatives include no inability to bear weight. She has tried ice, elevation, NSAIDs and rest (aleve) for the symptoms. The treatment provided mild relief.       Constitution: Negative for chills, fatigue and fever.   HENT: Negative for facial swelling and facial trauma.    Neck: Negative for neck stiffness.   Cardiovascular: Negative for chest trauma.   Eyes: Negative for eye trauma, double vision and blurred vision.   Gastrointestinal: Negative for abdominal trauma, abdominal pain and rectal bleeding.   Genitourinary: Negative for hematuria, missed menses, genital trauma and pelvic pain.   Musculoskeletal: Positive for pain, trauma, joint pain and joint swelling. Negative for abnormal ROM of joint.   Skin: Negative for color change, wound, abrasion, laceration and bruising.   Neurological: Negative for dizziness, history of vertigo, light-headedness, coordination disturbances, altered mental status and loss of consciousness.   Hematologic/Lymphatic: Negative for history of bleeding disorder.   Psychiatric/Behavioral: Negative for altered mental status.       Objective:      Physical Exam   Constitutional: She is oriented to person, place, and time. She " appears well-developed and well-nourished. She is cooperative.  Non-toxic appearance. She does not appear ill. No distress.   HENT:   Head: Normocephalic and atraumatic. Head is without abrasion, without contusion and without laceration.   Right Ear: Hearing, tympanic membrane, external ear and ear canal normal. No hemotympanum.   Left Ear: Hearing, tympanic membrane, external ear and ear canal normal. No hemotympanum.   Nose: Nose normal. No mucosal edema, rhinorrhea or nasal deformity. No epistaxis. Right sinus exhibits no maxillary sinus tenderness and no frontal sinus tenderness. Left sinus exhibits no maxillary sinus tenderness and no frontal sinus tenderness.   Mouth/Throat: Uvula is midline, oropharynx is clear and moist and mucous membranes are normal. No trismus in the jaw. Normal dentition. No uvula swelling. No posterior oropharyngeal erythema.   Eyes: Conjunctivae, EOM and lids are normal. Pupils are equal, round, and reactive to light. Right eye exhibits no discharge. Left eye exhibits no discharge. No scleral icterus.   Sclera clear bilat   Neck: Trachea normal, normal range of motion, full passive range of motion without pain and phonation normal. Neck supple. No spinous process tenderness and no muscular tenderness present. No neck rigidity. No tracheal deviation present.   Cardiovascular: Normal rate, regular rhythm, normal heart sounds, intact distal pulses and normal pulses.   Pulses:       Dorsalis pedis pulses are 2+ on the right side, and 2+ on the left side.   Pulmonary/Chest: Effort normal and breath sounds normal. No respiratory distress.   Abdominal: Soft. Normal appearance and bowel sounds are normal. She exhibits no distension, no pulsatile midline mass and no mass. There is no tenderness.   Musculoskeletal: Normal range of motion. She exhibits no edema or deformity.        Left ankle: She exhibits swelling. She exhibits normal pulse. Tenderness. No lateral malleolus and no medial  malleolus tenderness found.        Feet:    Neurological: She is alert and oriented to person, place, and time. She has normal strength. No cranial nerve deficit or sensory deficit. She exhibits normal muscle tone. She displays no seizure activity. Coordination normal. GCS eye subscore is 4. GCS verbal subscore is 5. GCS motor subscore is 6.   Skin: Skin is warm, dry and intact. Capillary refill takes less than 2 seconds. No abrasion, no bruising, no burn, no ecchymosis and no laceration noted. She is not diaphoretic. No pallor.   Psychiatric: She has a normal mood and affect. Her speech is normal and behavior is normal. Judgment and thought content normal. Cognition and memory are normal.   Nursing note and vitals reviewed.     Xr Foot Complete 3 View Left    Result Date: 3/12/2019  EXAMINATION: XR FOOT COMPLETE 3 VIEW LEFT CLINICAL HISTORY: Overexertion from strenuous movement or load, initial encounter FINDINGS: There is DJD and spurs on the calcaneus.  There is a hallux valgus deformity.  No fracture dislocation bone destruction seen.     See above Electronically signed by: Dong Norton MD Date:    03/12/2019 Time:    09:17  Assessment:       1. Injury caused by twisting with sudden strenuous movement, initial encounter    2. Foot trauma, left, initial encounter        Plan:         Injury caused by twisting with sudden strenuous movement, initial encounter  -     XR FOOT COMPLETE 3 VIEW LEFT; Future; Expected date: 03/12/2019  -     POCT urine pregnancy    Foot trauma, left, initial encounter    Continue RICE-- may use warm compresses as well for comfort. Wrap foot with ACE for comfort when at work, if it helps.

## 2019-03-12 NOTE — PROGRESS NOTES
"HISTORY OF PRESENT ILLNESS:    Tonya Rivas is a 38 y.o. female, [id number], Patient's last menstrual period was 2019.,  presents for a routine exam and has no new complaints.  -requests Diflucan refills due to frequent yeast infections, especially after intercourse with her .  -is asymptomatic today and denies use of vulvovaginal irritants or associated fever, pelvic pain, odor, UTI sx, AUB and states her BS is normal.   -also is interested in getting pregnant one more time and it has to be this year.    Past Medical History:   Diagnosis Date    Chronic kidney disease     Diabetes mellitus type II     Herpes simplex virus (HSV) infection     History of migraine headaches     Kidney stones     Obesity 2013    VERA (obstructive sleep apnea)        Past Surgical History:   Procedure Laterality Date     SECTION      x3    EXTRACORPOREAL SHOCK WAVE LITHOTRIPSY      LAPAROSCOPIC GASTRIC BANDING         MEDICATIONS AND ALLERGIES:      Current Outpatient Medications:     aluminum chloride (DRYSOL DAB-O-MATIC) 20 % external solution, Apply to dry skin nightly x 3 nights, then 1-2 nights per week., Disp: 60 mL, Rfl: 3    BD INSULIN SYRINGE ULTRA-FINE 1/2 mL 31 x 5/16" Syrg, , Disp: , Rfl: 2    blood sugar diagnostic Strp, Checks 3 times daily for Relion Prime meter, Disp: 100 each, Rfl: 12    fluconazole (DIFLUCAN) 150 MG Tab, TAKE 1 TABLET BY MOUTH ONCE AND MAY RETREAT IN 3 DAYS IF STILL SYMPTOMATIC, Disp: 2 tablet, Rfl: 0    lancets Misc, 1 Device by Misc.(Non-Drug; Combo Route) route 2 (two) times daily. Tests 3 times daily, Disp: 300 each, Rfl: 11    metFORMIN (GLUCOPHAGE) 1000 MG tablet, TAKE 1 TABLET (1,000 MG TOTAL) BY MOUTH 2 (TWO) TIMES DAILY WITH MEALS., Disp: 180 tablet, Rfl: 0    needle, disp, 31 gauge 31 gauge x 5/16" Ndle, 1 each by Misc.(Non-Drug; Combo Route) route once daily., Disp: 100 each, Rfl: 1    pen needle, diabetic (NOVOTWIST) 32 gauge x 1/5" Ndle, Check blood " sugar QID, Disp: 100 each, Rfl: 3    fluconazole (DIFLUCAN) 150 MG Tab, Take one tablet by mouth once and may retreat in 3 days if still symptomatic, Disp: 2 tablet, Rfl: 4    triamcinolone acetonide 0.1% (KENALOG) 0.1 % cream, Apply topically 2 (two) times daily. for 14 days, Disp: 80 g, Rfl: 11    TRULICITY 1.5 mg/0.5 mL PnIj, INJECT 1.5 MG INTO THE SKIN EVERY 7 DAYS., Disp: , Rfl: 11    Review of patient's allergies indicates:  No Known Allergies    Family History   Problem Relation Age of Onset    Cancer Mother     Learning disabilities Son     Heart disease Maternal Grandfather     Breast cancer Neg Hx     Colon cancer Neg Hx     Ovarian cancer Neg Hx        Social History     Socioeconomic History    Marital status:      Spouse name: Not on file    Number of children: Not on file    Years of education: Not on file    Highest education level: Not on file   Social Needs    Financial resource strain: Not on file    Food insecurity - worry: Not on file    Food insecurity - inability: Not on file    Transportation needs - medical: Not on file    Transportation needs - non-medical: Not on file   Occupational History    Not on file   Tobacco Use    Smoking status: Never Smoker    Smokeless tobacco: Never Used   Substance and Sexual Activity    Alcohol use: No     Comment: social    Drug use: No    Sexual activity: Yes     Partners: Male     Birth control/protection: None   Other Topics Concern    Are you pregnant or think you may be? No    Breast-feeding No   Social History Narrative    Not on file       OBSTETRIC HISTORY:   Number of cesareans: 3   Number of vaginal deliveries: 1  Number of PTD: 2  Number of living children: 3 (one  at age 3 due to lissencephaly)     COMPREHENSIVE GYN HISTORY:  PAP History: Denies abnormal Paps. LAST PAP 16 NORMAL.  Infection History: Reports STDs. Herpes. Denies PID.  Benign History: Denies uterine fibroids. Denies ovarian cysts. Denies  "endometriosis. Denies other conditions.  Cancer History: Denies cervical cancer. Denies uterine cancer or hyperplasia. Denies ovarian cancer. Denies vulvar cancer or pre-cancer. Denies vaginal cancer or pre-cancer. Denies breast cancer. Denies colon cancer.  Sexual Activity History: Reports being sexually active.  Menstrual History: Monthly. Mod to light flow. 5 days.   Dysmenorrhea History: Denies dysmenorrhea.   Contraception History: Mirena IUD removed 6-19-17; Took OCPs, now using condoms.      ROS:  GENERAL: + WT GAIN. No swelling. No fatigue. No fever.  CARDIOVASCULAR: No chest pain. No shortness of breath. No leg cramps.   NEUROLOGICAL: No headaches. No vision changes.  BREASTS: No pain. No lumps. No discharge.  ABDOMEN: No pain. No nausea. No vomiting. No diarrhea. No constipation.  REPRODUCTIVE: No abnormal bleeding.   VULVA: No pain. No lesions. No itching.  VAGINA: No relaxation. No itching. No odor. No discharge. No lesions.  URINARY: No incontinence. No nocturia. No frequency. No dysuria.    /82   Ht 5' 8" (1.727 m)   Wt 94.8 kg (209 lb)   LMP 02/12/2019   BMI 31.78 kg/m²   6-19-17 Wt 88.9 kg (196 lb)     PE:  APPEARANCE: Well nourished, well developed, in no acute distress.  AFFECT: WNL, alert and oriented x 3.  SKIN: No acne or hirsutism.  NECK: Neck symmetric, without masses or thyromegaly.  NODES: No inguinal, cervical, axillary or femoral lymph node enlargement.  CHEST: Good respiratory effort.   ABDOMEN: Soft. No tenderness or masses. OBESE.  BREASTS: Symmetrical, no skin changes, visible lesions, palpable masses or nipple discharge bilaterally.  PELVIC: External female genitalia without lesions.  Female hair distribution. Adequate perineal body, Normal urethral meatus. Vagina moist and well rugated without lesions or discharge.  No significant cystocele or rectocele present. Cervix pink without lesions, discharge or tenderness. Uterus is 8 week size, regular, mobile and nontender. " Adnexa without masses or tenderness. EXAM DIFFICULT DUE TO BODY HABITUS.  EXTREMITIES: No edema    DIAGNOSIS:  1. Well woman exam with routine gynecological exam    2. Perimenopause    3. Recurrent vaginal candidiasis    4. Encounter for preconception consultation    5. Breast cancer screening        PLAN:    Orders Placed This Encounter    Mammo Digital Screening Bilat w/ Magdaleno    Liquid-based pap smear, screening    fluconazole (DIFLUCAN) 150 MG Tab       COUNSELING:  The patient was counseled today on:  -vaginitis prevention same as note 1 and use of condoms as  not circumcised;  -Diflucan use and potential side effects;  -procreation and future pregnancy, including optimal timing for becoming pregnant, use of prenatal vitamins that contain folate and iron, avoidance of alcohol and caffeine during early pregnancy, plus the negative effects of smoking on fecundity and on development of an early pregnancy, to review her immunization history and to update as necessary, to review her family history for potential inherited diseases that would require  screening;  -that she is at risk for infertility, miscarriage, NTD, PTD due to her age, history of DM, Obesity, CKD and prior history of NTD and PTD and should have a preconception consult with MFM if she desires pregnancy;  -name and number for [facility] for evaluation;  -A.C.S. Pap and pelvic exam guidelines (pap every 3 years), recomendations for yearly mammogram;  -to follow up with her PCP for other health maintenance.    FOLLOW-UP with Dr Roberts annually.

## 2019-03-12 NOTE — PATIENT INSTRUCTIONS
Treating Strains and Sprains  Strains and sprains happen when muscles or other soft tissues near your bones stretch or tear. These injuries can cause bruising, swelling, and pain. To ease your discomfort and speed the healing of your strain or sprain, follow the tips below. Remember, a strain or sprain can take 6 to 8 weeks to heal.     Important Note: Do not give aspirin to children or teens without discussing it with your healthcare provider first.        Ice first, heat later  · Use ice for the first 24 to 48 hours after injury. Ice helps prevent swelling and reduce pain. Ice the injury for no more than 20 minutes at a time and allow at least  20 minutes between icing sessions.  · Apply heat after the first 72 hours, once the swelling has gone down. Heat relaxes muscles and increases blood flow. Soak the injured area in warm water or use a heating pad set on low for no more than 15 minutes at a time.  Wrap and elevate  · Wrap an injured limb firmly with an elastic bandage. This provides support and helps prevent swelling. Dont wear an elastic bandage overnight. Watch for tingling, numbness, or increased pain, and remove the bandage immediately if any of these occurs.  · Elevate the injured area to help reduce swelling and throbbing. Its best to raise an injured limb above the level of your heart.     Medicines  · Over-the-counter medicines such as acetaminophen or ibuprofen can help reduce pain. Some also help reduce swelling.  · Take medicine only as directed.  · Rest the area even if medicines are controlling the pain.  Rest  · Rest the injured area by not using it for 24 hours.  · When youre ready, return slowly to your normal activities. Rest the injured area often.  · Dont use or walk on an injured limb if it hurts.  Date Last Reviewed: 9/3/2015  © 2853-2848 [employer]. [address] [place] [address], [place] [address]. All rights reserved. This information is not intended as a substitute for  professional medical care. Always follow your healthcare professional's instructions.

## 2019-03-13 ENCOUNTER — TELEPHONE (OUTPATIENT)
Dept: DERMATOLOGY | Facility: CLINIC | Age: 39
End: 2019-03-13

## 2019-03-30 ENCOUNTER — TELEPHONE (OUTPATIENT)
Dept: ADMINISTRATIVE | Facility: HOSPITAL | Age: 39
End: 2019-03-30

## 2019-04-01 ENCOUNTER — LAB VISIT (OUTPATIENT)
Dept: LAB | Facility: HOSPITAL | Age: 39
End: 2019-04-01
Attending: INTERNAL MEDICINE
Payer: COMMERCIAL

## 2019-04-01 DIAGNOSIS — E11.9 TYPE 2 DIABETES MELLITUS WITHOUT COMPLICATION, WITHOUT LONG-TERM CURRENT USE OF INSULIN: ICD-10-CM

## 2019-04-01 LAB
ALBUMIN SERPL BCP-MCNC: 3.4 G/DL (ref 3.5–5.2)
ALP SERPL-CCNC: 111 U/L (ref 55–135)
ALT SERPL W/O P-5'-P-CCNC: 58 U/L (ref 10–44)
ANION GAP SERPL CALC-SCNC: 9 MMOL/L (ref 8–16)
AST SERPL-CCNC: 77 U/L (ref 10–40)
BILIRUB SERPL-MCNC: 0.3 MG/DL (ref 0.1–1)
BUN SERPL-MCNC: 10 MG/DL (ref 6–20)
CALCIUM SERPL-MCNC: 8.8 MG/DL (ref 8.7–10.5)
CHLORIDE SERPL-SCNC: 105 MMOL/L (ref 95–110)
CHOLEST SERPL-MCNC: 170 MG/DL (ref 120–199)
CHOLEST/HDLC SERPL: 3.8 {RATIO} (ref 2–5)
CO2 SERPL-SCNC: 22 MMOL/L (ref 23–29)
CREAT SERPL-MCNC: 0.7 MG/DL (ref 0.5–1.4)
EST. GFR  (AFRICAN AMERICAN): >60 ML/MIN/1.73 M^2
EST. GFR  (NON AFRICAN AMERICAN): >60 ML/MIN/1.73 M^2
ESTIMATED AVG GLUCOSE: 203 MG/DL (ref 68–131)
GLUCOSE SERPL-MCNC: 327 MG/DL (ref 70–110)
HBA1C MFR BLD HPLC: 8.7 % (ref 4–5.6)
HDLC SERPL-MCNC: 45 MG/DL (ref 40–75)
HDLC SERPL: 26.5 % (ref 20–50)
LDLC SERPL CALC-MCNC: 113.2 MG/DL (ref 63–159)
NONHDLC SERPL-MCNC: 125 MG/DL
POTASSIUM SERPL-SCNC: 4 MMOL/L (ref 3.5–5.1)
PROT SERPL-MCNC: 6.8 G/DL (ref 6–8.4)
SODIUM SERPL-SCNC: 136 MMOL/L (ref 136–145)
TRIGL SERPL-MCNC: 59 MG/DL (ref 30–150)

## 2019-04-01 PROCEDURE — 80061 LIPID PANEL: CPT

## 2019-04-01 PROCEDURE — 36415 COLL VENOUS BLD VENIPUNCTURE: CPT | Mod: PO

## 2019-04-01 PROCEDURE — 83036 HEMOGLOBIN GLYCOSYLATED A1C: CPT

## 2019-04-01 PROCEDURE — 80053 COMPREHEN METABOLIC PANEL: CPT

## 2019-04-03 NOTE — PROGRESS NOTES
a1c still high presumed on metformin and trulicity. Hx of jardiance.  Lipid still high > 70 not on statin  LFTs elevated nonspecific    Results to pt via my [facility]. Recommended OV to discuss statin and possibly restarting jardiance.

## 2019-04-08 ENCOUNTER — TELEPHONE (OUTPATIENT)
Dept: ADMINISTRATIVE | Facility: HOSPITAL | Age: 39
End: 2019-04-08

## 2019-05-07 ENCOUNTER — TELEPHONE (OUTPATIENT)
Dept: ADMINISTRATIVE | Facility: HOSPITAL | Age: 39
End: 2019-05-07

## 2019-05-09 ENCOUNTER — OFFICE VISIT (OUTPATIENT)
Dept: DERMATOLOGY | Facility: CLINIC | Age: 39
End: 2019-05-09
Payer: COMMERCIAL

## 2019-05-09 DIAGNOSIS — L40.0 PSORIASIS VULGARIS: Primary | ICD-10-CM

## 2019-05-09 PROCEDURE — 99214 OFFICE O/P EST MOD 30 MIN: CPT | Mod: S$GLB,,, | Performed by: DERMATOLOGY

## 2019-05-09 PROCEDURE — 99214 PR OFFICE/OUTPT VISIT, EST, LEVL IV, 30-39 MIN: ICD-10-PCS | Mod: S$GLB,,, | Performed by: DERMATOLOGY

## 2019-05-09 RX ORDER — CALCIPOTRIENE 50 UG/G
CREAM TOPICAL
Qty: 60 G | Refills: 3 | Status: ON HOLD | OUTPATIENT
Start: 2019-05-09 | End: 2019-05-17 | Stop reason: HOSPADM

## 2019-05-09 NOTE — PROGRESS NOTES
Subjective:       Patient ID:  Tonya Rivas is a 38 y.o. female who presents for   Chief Complaint   Patient presents with    Nail Problem     lamisil did not help    Facial Swelling     bags under eyes     Nail Problem  - Follow-up  Symptom course: unchanged and worsening  Currently using: finished taking Lamisil (pulse dose 1wk/mo x 4 mo)  Affected locations: right fingers and left fingers (nails)  Signs / symptoms: cracking, tender and scaling  Severity: mild to moderate      Patient has been trying to get pregnant, and her period is 1 week late.    Review of Systems   Musculoskeletal: Negative for joint swelling and arthralgias.   Skin: Negative for itching and rash.        Objective:    Physical Exam   Constitutional: She appears well-developed and well-nourished. No distress.   HENT:   Mouth/Throat: Lips normal.    Eyes: Lids are normal.  No conjunctival no injection.   Neurological: She is alert and oriented to person, place, and time. She is not disoriented.   Psychiatric: She has a normal mood and affect.   Skin:   Areas Examined (abnormalities noted in diagram):   RUE Inspected  LUE Inspection Performed  Nails and Digits Inspection Performed           Patient denies any scaling skin lesions elsewhere on the body.    Diagram Legend     Erythematous scaling macule/papule c/w actinic keratosis       Vascular papule c/w angioma      Pigmented verrucoid papule/plaque c/w seborrheic keratosis      Yellow umbilicated papule c/w sebaceous hyperplasia      Irregularly shaped tan macule c/w lentigo     1-2 mm smooth white papules consistent with Milia      Movable subcutaneous cyst with punctum c/w epidermal inclusion cyst      Subcutaneous movable cyst c/w pilar cyst      Firm pink to brown papule c/w dermatofibroma      Pedunculated fleshy papule(s) c/w skin tag(s)      Evenly pigmented macule c/w junctional nevus     Mildly variegated pigmented, slightly irregular-bordered macule c/w mildly atypical nevus       Flesh colored to evenly pigmented papule c/w intradermal nevus       Pink pearly papule/plaque c/w basal cell carcinoma      Erythematous hyperkeratotic cursted plaque c/w SCC      Surgical scar with no sign of skin cancer recurrence      Open and closed comedones      Inflammatory papules and pustules      Verrucoid papule consistent consistent with wart     Erythematous eczematous patches and plaques     Dystrophic onycholytic nail with subungual debris c/w onychomycosis     Umbilicated papule    Erythematous-base heme-crusted tan verrucoid plaque consistent with inflamed seborrheic keratosis     Erythematous Silvery Scaling Plaque c/w Psoriasis     See annotation      Assessment / Plan:        Psoriasis vulgaris  Suspect nail psoriasis given severe subungual hyperkeratosis and erythema of proximal nail fold and nail bed.  - As patient may be pregnant and would be considered high-risk, will defer to OB re: topical and intralesional steroid use.  - Trial of topical vitamin D analog to start:  -     calcipotriene (DOVONOX) 0.005 % cream; Apply to affected areas of hands BID prn psoriasis  Dispense: 60 g; Refill: 3       Follow up in about 1 month (around 6/6/2019).

## 2019-05-10 ENCOUNTER — TELEPHONE (OUTPATIENT)
Dept: OBSTETRICS AND GYNECOLOGY | Facility: CLINIC | Age: 39
End: 2019-05-10

## 2019-05-10 DIAGNOSIS — Z34.90 PREGNANCY, UNSPECIFIED GESTATIONAL AGE: Primary | ICD-10-CM

## 2019-05-13 ENCOUNTER — PATIENT MESSAGE (OUTPATIENT)
Dept: OBSTETRICS AND GYNECOLOGY | Facility: CLINIC | Age: 39
End: 2019-05-13

## 2019-05-15 RX ORDER — METFORMIN HYDROCHLORIDE 1000 MG/1
1000 TABLET ORAL 2 TIMES DAILY WITH MEALS
Qty: 180 TABLET | Refills: 0 | Status: ON HOLD | OUTPATIENT
Start: 2019-05-15 | End: 2019-05-17 | Stop reason: HOSPADM

## 2019-05-16 ENCOUNTER — OFFICE VISIT (OUTPATIENT)
Dept: OBSTETRICS AND GYNECOLOGY | Facility: CLINIC | Age: 39
DRG: 832 | End: 2019-05-16
Payer: COMMERCIAL

## 2019-05-16 ENCOUNTER — TELEPHONE (OUTPATIENT)
Dept: OBSTETRICS AND GYNECOLOGY | Facility: CLINIC | Age: 39
End: 2019-05-16

## 2019-05-16 ENCOUNTER — PATIENT MESSAGE (OUTPATIENT)
Dept: OBSTETRICS AND GYNECOLOGY | Facility: CLINIC | Age: 39
End: 2019-05-16

## 2019-05-16 ENCOUNTER — HOSPITAL ENCOUNTER (INPATIENT)
Facility: OTHER | Age: 39
LOS: 1 days | Discharge: HOME | DRG: 832 | End: 2019-05-17
Attending: OBSTETRICS & GYNECOLOGY | Admitting: OBSTETRICS & GYNECOLOGY
Payer: COMMERCIAL

## 2019-05-16 VITALS
WEIGHT: 211 LBS | DIASTOLIC BLOOD PRESSURE: 84 MMHG | BODY MASS INDEX: 31.98 KG/M2 | HEIGHT: 68 IN | SYSTOLIC BLOOD PRESSURE: 128 MMHG

## 2019-05-16 DIAGNOSIS — E11.65 UNCONTROLLED TYPE 2 DIABETES MELLITUS WITH HYPERGLYCEMIA: ICD-10-CM

## 2019-05-16 DIAGNOSIS — Q04.3 LISSENCEPHALY: ICD-10-CM

## 2019-05-16 DIAGNOSIS — O20.0 THREATENED MISCARRIAGE IN EARLY PREGNANCY: ICD-10-CM

## 2019-05-16 DIAGNOSIS — O09.299 HISTORY OF PRE-ECLAMPSIA IN PRIOR PREGNANCY, CURRENTLY PREGNANT: ICD-10-CM

## 2019-05-16 DIAGNOSIS — O36.80X0 PREGNANCY WITH UNCERTAIN FETAL VIABILITY, SINGLE OR UNSPECIFIED FETUS: ICD-10-CM

## 2019-05-16 DIAGNOSIS — O09.899 HISTORY OF PRETERM DELIVERY, CURRENTLY PREGNANT: ICD-10-CM

## 2019-05-16 DIAGNOSIS — R82.4 KETONURIA: ICD-10-CM

## 2019-05-16 DIAGNOSIS — Z34.81 PRENATAL CARE, SUBSEQUENT PREGNANCY IN FIRST TRIMESTER: Primary | ICD-10-CM

## 2019-05-16 DIAGNOSIS — N18.9 CHRONIC KIDNEY DISEASE, UNSPECIFIED CKD STAGE: ICD-10-CM

## 2019-05-16 DIAGNOSIS — O09.521 ELDERLY MULTIGRAVIDA IN FIRST TRIMESTER: ICD-10-CM

## 2019-05-16 DIAGNOSIS — A60.09 HERPES GENITALIS IN WOMEN: ICD-10-CM

## 2019-05-16 DIAGNOSIS — O20.0 THREATENED MISCARRIAGE IN EARLY PREGNANCY: Primary | ICD-10-CM

## 2019-05-16 DIAGNOSIS — Z34.90 PREGNANCY, UNSPECIFIED GESTATIONAL AGE: ICD-10-CM

## 2019-05-16 DIAGNOSIS — O24.111 TYPE 2 DIABETES MELLITUS AFFECTING PREGNANCY IN FIRST TRIMESTER, ANTEPARTUM: Primary | ICD-10-CM

## 2019-05-16 PROBLEM — O09.529 ELDERLY MULTIGRAVIDA: Status: ACTIVE | Noted: 2019-05-16

## 2019-05-16 LAB
B-HCG UR QL: POSITIVE
B-OH-BUTYR BLD STRIP-SCNC: 0.3 MMOL/L (ref 0–0.5)
BACTERIA #/AREA URNS AUTO: ABNORMAL /HPF
BILIRUB UR QL STRIP: NEGATIVE
CLARITY UR REFRACT.AUTO: CLEAR
COLOR UR AUTO: YELLOW
CREAT UR-MCNC: 143 MG/DL (ref 15–325)
CTP QC/QA: YES
GLUCOSE UR QL STRIP: ABNORMAL
HGB UR QL STRIP: ABNORMAL
HYALINE CASTS UR QL AUTO: 1 /LPF
KETONES UR QL STRIP: ABNORMAL
LEUKOCYTE ESTERASE UR QL STRIP: ABNORMAL
MICROSCOPIC COMMENT: ABNORMAL
NITRITE UR QL STRIP: NEGATIVE
PH UR STRIP: 5 [PH] (ref 5–8)
POCT GLUCOSE: 199 MG/DL (ref 70–110)
POCT GLUCOSE: 223 MG/DL (ref 70–110)
POCT GLUCOSE: 231 MG/DL (ref 70–110)
PROT UR QL STRIP: NEGATIVE
PROT UR-MCNC: 13 MG/DL (ref 0–15)
PROT/CREAT UR: 0.09 MG/G{CREAT} (ref 0–0.2)
RBC #/AREA URNS AUTO: 3 /HPF (ref 0–4)
SP GR UR STRIP: 1.03 (ref 1–1.03)
SQUAMOUS #/AREA URNS AUTO: 4 /HPF
URN SPEC COLLECT METH UR: ABNORMAL
WBC #/AREA URNS AUTO: 22 /HPF (ref 0–5)
YEAST UR QL AUTO: ABNORMAL

## 2019-05-16 PROCEDURE — 3008F PR BODY MASS INDEX (BMI) DOCUMENTED: ICD-10-PCS | Mod: CPTII,S$GLB,, | Performed by: NURSE PRACTITIONER

## 2019-05-16 PROCEDURE — 82570 ASSAY OF URINE CREATININE: CPT

## 2019-05-16 PROCEDURE — 3008F BODY MASS INDEX DOCD: CPT | Mod: CPTII,S$GLB,, | Performed by: NURSE PRACTITIONER

## 2019-05-16 PROCEDURE — 63600175 PHARM REV CODE 636 W HCPCS: Performed by: STUDENT IN AN ORGANIZED HEALTH CARE EDUCATION/TRAINING PROGRAM

## 2019-05-16 PROCEDURE — 11000001 HC ACUTE MED/SURG PRIVATE ROOM

## 2019-05-16 PROCEDURE — 3046F HEMOGLOBIN A1C LEVEL >9.0%: CPT | Mod: CPTII,S$GLB,, | Performed by: NURSE PRACTITIONER

## 2019-05-16 PROCEDURE — 81025 URINE PREGNANCY TEST: CPT | Mod: S$GLB,,, | Performed by: NURSE PRACTITIONER

## 2019-05-16 PROCEDURE — 81025 POCT URINE PREGNANCY: ICD-10-PCS | Mod: S$GLB,,, | Performed by: NURSE PRACTITIONER

## 2019-05-16 PROCEDURE — 87491 CHLMYD TRACH DNA AMP PROBE: CPT

## 2019-05-16 PROCEDURE — 81001 URINALYSIS AUTO W/SCOPE: CPT

## 2019-05-16 PROCEDURE — 99213 PR OFFICE/OUTPT VISIT, EST, LEVL III, 20-29 MIN: ICD-10-PCS | Mod: S$GLB,,, | Performed by: NURSE PRACTITIONER

## 2019-05-16 PROCEDURE — 99999 PR PBB SHADOW E&M-EST. PATIENT-LVL IV: ICD-10-PCS | Mod: PBBFAC,,, | Performed by: NURSE PRACTITIONER

## 2019-05-16 PROCEDURE — 82010 KETONE BODYS QUAN: CPT

## 2019-05-16 PROCEDURE — 3046F PR MOST RECENT HEMOGLOBIN A1C LEVEL > 9.0%: ICD-10-PCS | Mod: CPTII,S$GLB,, | Performed by: NURSE PRACTITIONER

## 2019-05-16 PROCEDURE — 36415 COLL VENOUS BLD VENIPUNCTURE: CPT

## 2019-05-16 PROCEDURE — 99213 OFFICE O/P EST LOW 20 MIN: CPT | Mod: S$GLB,,, | Performed by: NURSE PRACTITIONER

## 2019-05-16 PROCEDURE — 99999 PR PBB SHADOW E&M-EST. PATIENT-LVL IV: CPT | Mod: PBBFAC,,, | Performed by: NURSE PRACTITIONER

## 2019-05-16 PROCEDURE — 87086 URINE CULTURE/COLONY COUNT: CPT

## 2019-05-16 PROCEDURE — 63600175 PHARM REV CODE 636 W HCPCS: Performed by: OBSTETRICS & GYNECOLOGY

## 2019-05-16 RX ORDER — DIPHENHYDRAMINE HYDROCHLORIDE 50 MG/ML
25 INJECTION INTRAMUSCULAR; INTRAVENOUS EVERY 4 HOURS PRN
Status: DISCONTINUED | OUTPATIENT
Start: 2019-05-16 | End: 2019-05-17 | Stop reason: HOSPADM

## 2019-05-16 RX ORDER — INSULIN ASPART 100 [IU]/ML
6 INJECTION, SOLUTION INTRAVENOUS; SUBCUTANEOUS
Status: DISCONTINUED | OUTPATIENT
Start: 2019-05-17 | End: 2019-05-17 | Stop reason: HOSPADM

## 2019-05-16 RX ORDER — PRENATAL WITH FERROUS FUM AND FOLIC ACID 3080; 920; 120; 400; 22; 1.84; 3; 20; 10; 1; 12; 200; 27; 25; 2 [IU]/1; [IU]/1; MG/1; [IU]/1; MG/1; MG/1; MG/1; MG/1; MG/1; MG/1; UG/1; MG/1; MG/1; MG/1; MG/1
1 TABLET ORAL DAILY
Status: DISCONTINUED | OUTPATIENT
Start: 2019-05-17 | End: 2019-05-17 | Stop reason: HOSPADM

## 2019-05-16 RX ORDER — AMOXICILLIN 250 MG
1 CAPSULE ORAL NIGHTLY PRN
Status: DISCONTINUED | OUTPATIENT
Start: 2019-05-16 | End: 2019-05-17 | Stop reason: HOSPADM

## 2019-05-16 RX ORDER — ONDANSETRON 8 MG/1
8 TABLET, ORALLY DISINTEGRATING ORAL EVERY 8 HOURS PRN
Status: DISCONTINUED | OUTPATIENT
Start: 2019-05-16 | End: 2019-05-17 | Stop reason: HOSPADM

## 2019-05-16 RX ORDER — INSULIN ASPART 100 [IU]/ML
3 INJECTION, SOLUTION INTRAVENOUS; SUBCUTANEOUS ONCE
Status: COMPLETED | OUTPATIENT
Start: 2019-05-16 | End: 2019-05-16

## 2019-05-16 RX ORDER — DIPHENHYDRAMINE HCL 25 MG
25 CAPSULE ORAL EVERY 4 HOURS PRN
Status: DISCONTINUED | OUTPATIENT
Start: 2019-05-16 | End: 2019-05-17 | Stop reason: HOSPADM

## 2019-05-16 RX ORDER — SIMETHICONE 80 MG
1 TABLET,CHEWABLE ORAL EVERY 6 HOURS PRN
Status: DISCONTINUED | OUTPATIENT
Start: 2019-05-16 | End: 2019-05-17 | Stop reason: HOSPADM

## 2019-05-16 RX ADMIN — HUMAN INSULIN 12 UNITS: 100 INJECTION, SUSPENSION SUBCUTANEOUS at 11:05

## 2019-05-16 RX ADMIN — INSULIN ASPART 3 UNITS: 100 INJECTION, SOLUTION INTRAVENOUS; SUBCUTANEOUS at 08:05

## 2019-05-16 NOTE — PROGRESS NOTES
"HISTORY OF PRESENT ILLNESS:    Tonya Rivas is a 38 y.o. female, [id number],  Patient's last menstrual period was 2019.  for a routine exam complaining of amenorrhea.      -Denies nausea, vomiting, cramping.   -Has noticed a little bleeding, but has a hemorrhoid and is not sure if the spotting is from that or vaginally.   -Knows her blood sugar is out of control because she stopped Trulicity when she found out she was pregnant and is only on Metformin, states "doesn't feel bad, but  this AM".    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     Chronic kidney disease     Diabetes mellitus type II     uncontrolled    Herpes simplex virus (HSV) infection     History of migraine headaches     History of  delivery, currently pregnant x2     Kidney stones     Obesity 2013    VERA (obstructive sleep apnea)        Past Surgical History:   Procedure Laterality Date     SECTION      x3    EXTRACORPOREAL SHOCK WAVE LITHOTRIPSY      LAPAROSCOPIC GASTRIC BANDING         MEDICATIONS AND ALLERGIES:      Current Outpatient Medications:     aluminum chloride (DRYSOL DAB-O-MATIC) 20 % external solution, Apply to dry skin nightly x 3 nights, then 1-2 nights per week., Disp: 60 mL, Rfl: 3    BD INSULIN SYRINGE ULTRA-FINE 1/2 mL 31 x 5/16" Syrg, , Disp: , Rfl: 2    blood sugar diagnostic Strp, Checks 3 times daily for Relion Prime meter, Disp: 100 each, Rfl: 12    blood sugar diagnostic Strp, 1 each by Misc.(Non-Drug; Combo Route) route continuous prn., Disp: 100 strip, Rfl: 0    calcipotriene (DOVONOX) 0.005 % cream, Apply to affected areas of hands BID prn psoriasis, Disp: 60 g, Rfl: 3    lancets Misc, 1 Device by Misc.(Non-Drug; Combo Route) route 2 (two) times daily. Tests 3 times daily, Disp: 300 each, Rfl: 11    metFORMIN (GLUCOPHAGE) 1000 MG tablet, TAKE 1 TABLET (1,000 MG TOTAL) BY MOUTH 2 (TWO) TIMES DAILY WITH MEALS., Disp: 180 " "tablet, Rfl: 0    needle, disp, 31 gauge 31 gauge x 5/16" Ndle, 1 each by Misc.(Non-Drug; Combo Route) route once daily., Disp: 100 each, Rfl: 1    pen needle, diabetic (NOVOTWIST) 32 gauge x 1/5" Ndle, Check blood sugar QID, Disp: 100 each, Rfl: 3    TRULICITY 1.5 mg/0.5 mL PnIj, INJECT 1.5 MG INTO THE SKIN EVERY 7 DAYS., Disp: , Rfl: 11    Review of patient's allergies indicates:  No Known Allergies    Family History   Problem Relation Age of Onset    Cancer Mother     Learning disabilities Son     Heart disease Maternal Grandfather     Breast cancer Neg Hx     Colon cancer Neg Hx     Ovarian cancer Neg Hx        Social History     Socioeconomic History    Marital status:      Spouse name: Not on file    Number of children: Not on file    Years of education: Not on file    Highest education level: Not on file   Occupational History    Occupation: RN     Comment: Dialysis   Social Needs    Financial resource strain: Not on file    Food insecurity:     Worry: Not on file     Inability: Not on file    Transportation needs:     Medical: Not on file     Non-medical: Not on file   Tobacco Use    Smoking status: Never Smoker    Smokeless tobacco: Never Used   Substance and Sexual Activity    Alcohol use: No     Comment: social    Drug use: No    Sexual activity: Yes     Partners: Male     Birth control/protection: None   Lifestyle    Physical activity:     Days per week: Not on file     Minutes per session: Not on file    Stress: Not on file   Relationships    Social connections:     Talks on phone: Not on file     Gets together: Not on file     Attends Religion service: Not on file     Active member of club or organization: Not on file     Attends meetings of clubs or organizations: Not on file     Relationship status: Not on file   Other Topics Concern    Are you pregnant or think you may be? No    Breast-feeding No   Social History Narrative    Not on file       OB HISTORY: Number " "of cesareans: 3  Number of vaginal deliveries: 1 Number of PTD: 2 Number of living children: 3 (one  at age 3 due to lissencephaly)       COMPREHENSIVE GYN HISTORY:  PAP History: Denies abnormal Paps. LAST PAP 3-12-19 NORMAL  Infection History: Denies STDs. Denies PID.  Benign History: Denies uterine fibroids. Denies ovarian cysts. Denies endometriosis. Denies other conditions.  Cancer History: Denies cervical cancer. Denies uterine cancer or hyperplasia. Denies ovarian cancer. Denies vulvar cancer or pre-cancer. Denies vaginal cancer or pre-cancer. Denies breast cancer. Denies colon cancer.  Sexual Activity History: Reports currently being sexually active  Menstrual History: None.  Contraception: None    ROS:  GENERAL: No weight changes. No swelling. + FATIGUE. No fever.  CARDIOVASCULAR: No chest pain. No shortness of breath. No leg cramps.   NEUROLOGICAL: No headaches. No vision changes.  BREASTS: + TENDERNESS. No lumps. No discharge.  ABDOMEN: No pain. No nausea. No vomiting. No diarrhea. No constipation.  REPRODUCTIVE: + SPOTTING.  VULVA: No pain. No lesions. No itching.  VAGINA: No relaxation. No itching. No odor. No discharge. No lesions.  URINARY: No incontinence. No nocturia. No frequency. No dysuria.    /84   Ht 5' 8" (1.727 m)   Wt 95.7 kg (211 lb)   LMP 2019   BMI 32.08 kg/m²     PE:  AFFECT: Alert and oriented X 3.  GENERAL: Appearance well-nourished, well-developed, in no acute distress.  HEENT: PERRL, Anicteric Sclera.  TEETH: Good dentition.  THYROID: No thyromegally or nodules.  BREASTS: No masses, skin changes, nipple discharge or adenopathy bilaterally.  LUNGS: Clear to auscultation bilaterally, no wheezes or rhonchi.  HEART: Regular rate and rhythm without murmurs, gallops or rubs.  ABDOMEN: Soft and nontender without masses or organomegally.  EXTREMITIES: No cyanosis, clubbing or edema. No calf tenderness.  SKIN: No lesions or rashes.  LYMPH NODES: No axillary, cervical or " inguinal adenopathy.  VULVA: No lesions, masses or tenderness.  VAGINA: Moist and well rugated without lesions or discharge. NO BLOOD SEEN.  CERVIX: Moist and pink without lesions, discharge or tenderness.      UTERUS SIZE: 6 week size, nontender and without masses.  ADNEXA: No masses or tenderness.  RECTUM: SMALL NON TENDER HEMORRHOID TAG.   ESTIMATE OF PELVIC CAPACITY: Adequate    PROCEDURES:  UPT Positive  Urine dipstick positive for trace blood, 1+ ketones and 2+ protein    DIAGNOSIS:  1. Prenatal care, subsequent pregnancy in first trimester    2. Pregnancy, unspecified gestational age    3. Threatened miscarriage in early pregnancy    4. Pregnancy with uncertain fetal viability, single or unspecified fetus    5. Elderly multigravida in first trimester    6. History of  delivery, currently pregnant    7. History of pre-eclampsia in prior pregnancy, currently pregnant    8. Uncontrolled type 2 diabetes mellitus with hyperglycemia complicating pregnancy    9. Ketonuria    10. Chronic kidney disease, currently pregnant    11. 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 History of herpes simplex        PLAN:    Orders Placed This Encounter    Urine culture    C. trachomatis/N. gonorrhoeae by AMP DNA [facility]; Cervix    HIV 1/2 Ag/Ab (4th Gen)    RPR    Hepatitis B surface antigen    Rubella antibody, IgG    CBC auto differential    Hemoglobin Electrophoresis,Hgb A2 Gregory.    Urinalysis    Hemoglobin A1c    Protein / creatinine ratio, urine    hCG, quantitative    Comprehensive metabolic panel    Urinalysis Microscopic    Ambulatory consult to Maternal Fetal Medicine    Ambulatory consult to Endocrinology    POCT urine pregnancy    Type & Screen    US OB/GYN Procedure ([facility])-Future   WANTS MATERNITI 21    1st TRIMESTER COUNSELING:  Common complaints of pregnancy  HIV and other routine prenatal tests including  genetic screening  Risk factors identified by prenatal  history  Anticipated course of prenatal care  Nutrition and weight gain counseling  Toxoplasmosis precautions (Cats/Raw Meat)  Sexual activity and exercise  Environmental/Work hazards  Travel  Tobacco (Ask, Advise, Assess, Assist, and Arrange), as well as alcohol and drug use  Use of any medications (Including supplements, Vitamins, Herbs, or OTC Drugs)  Indications for Ultrasound  Domestic violence  Seat belt use  Childbirth classes/Hospital facilities     TERATOLOGY COUNSELING:   Patient's age >= 35 years as of estimated date of delivery  Neural tube effect (Meningomyelocele, Spina Bifida, or Anencephaly)  Down Syndrome  Inherited genetic or chromosomal disorder  Maternal metabolic disorder (EG, Type 1 Diabetes, PKU)    FOLLOW-UP for a dating US and M consult, Endocrinology visit and New Ob Visit in 2 weeks with Dr Sylvester.    ADDENDUM: PER SAMIR SYLVESTER AND TAMMY ([facility]) CALLED PT TO GO TO [address] FOR DIRECT ADMIT FOR GLUCOSE CONTROL, R/O DKA. Vivian Osuna NP

## 2019-05-16 NOTE — TELEPHONE ENCOUNTER
PHONE CALL: Per Dr Roberts wants pt admitted for glucose control. Spoke with M Dr Marcus, on call, who agreed to direct admit through 6th floor today. She will make arrangements. Called Pt and advised of plan and she was agreeable. Vivian Osuna NP

## 2019-05-17 ENCOUNTER — TELEPHONE (OUTPATIENT)
Dept: OBSTETRICS AND GYNECOLOGY | Facility: CLINIC | Age: 39
End: 2019-05-17

## 2019-05-17 VITALS
TEMPERATURE: 97 F | BODY MASS INDEX: 31.98 KG/M2 | RESPIRATION RATE: 18 BRPM | OXYGEN SATURATION: 100 % | WEIGHT: 211 LBS | SYSTOLIC BLOOD PRESSURE: 139 MMHG | HEIGHT: 68 IN | HEART RATE: 96 BPM | DIASTOLIC BLOOD PRESSURE: 89 MMHG

## 2019-05-17 DIAGNOSIS — O24.911 DIABETES MELLITUS AFFECTING PREGNANCY, FIRST TRIMESTER: Primary | ICD-10-CM

## 2019-05-17 LAB
C TRACH DNA SPEC QL NAA+PROBE: NOT DETECTED
N GONORRHOEA DNA SPEC QL NAA+PROBE: NOT DETECTED
POCT GLUCOSE: 147 MG/DL (ref 70–110)
POCT GLUCOSE: 169 MG/DL (ref 70–110)
POCT GLUCOSE: 180 MG/DL (ref 70–110)
POCT GLUCOSE: 183 MG/DL (ref 70–110)
POCT GLUCOSE: 191 MG/DL (ref 70–110)
POCT GLUCOSE: 220 MG/DL (ref 70–110)

## 2019-05-17 PROCEDURE — 99232 PR SUBSEQUENT HOSPITAL CARE,LEVL II: ICD-10-PCS | Mod: ,,, | Performed by: OBSTETRICS & GYNECOLOGY

## 2019-05-17 PROCEDURE — 63600175 PHARM REV CODE 636 W HCPCS: Performed by: OBSTETRICS & GYNECOLOGY

## 2019-05-17 PROCEDURE — 99232 SBSQ HOSP IP/OBS MODERATE 35: CPT | Mod: ,,, | Performed by: OBSTETRICS & GYNECOLOGY

## 2019-05-17 PROCEDURE — 25000003 PHARM REV CODE 250: Performed by: STUDENT IN AN ORGANIZED HEALTH CARE EDUCATION/TRAINING PROGRAM

## 2019-05-17 RX ORDER — IBUPROFEN 200 MG
16 TABLET ORAL
Status: DISCONTINUED | OUTPATIENT
Start: 2019-05-17 | End: 2019-05-17 | Stop reason: HOSPADM

## 2019-05-17 RX ORDER — GLUCAGON 1 MG
1 KIT INJECTION
Status: DISCONTINUED | OUTPATIENT
Start: 2019-05-17 | End: 2019-05-17 | Stop reason: HOSPADM

## 2019-05-17 RX ORDER — INSULIN LISPRO 100 [IU]/ML
8 INJECTION, SOLUTION INTRAVENOUS; SUBCUTANEOUS
Qty: 15 ML | Refills: 3 | Status: SHIPPED | OUTPATIENT
Start: 2019-05-17 | End: 2019-05-28 | Stop reason: ALTCHOICE

## 2019-05-17 RX ORDER — INSULIN LISPRO 100 [IU]/ML
8 INJECTION, SOLUTION INTRAVENOUS; SUBCUTANEOUS
Qty: 15 ML | Refills: 3 | Status: SHIPPED | OUTPATIENT
Start: 2019-05-17 | End: 2019-05-17

## 2019-05-17 RX ORDER — NAPROXEN SODIUM 220 MG
TABLET ORAL
Qty: 100 EACH | Refills: 0 | Status: SHIPPED | OUTPATIENT
Start: 2019-05-17 | End: 2020-05-20

## 2019-05-17 RX ORDER — INSULIN ASPART 100 [IU]/ML
1-10 INJECTION, SOLUTION INTRAVENOUS; SUBCUTANEOUS
Status: DISCONTINUED | OUTPATIENT
Start: 2019-05-17 | End: 2019-05-17

## 2019-05-17 RX ORDER — IBUPROFEN 200 MG
16 TABLET ORAL
Status: DISCONTINUED | OUTPATIENT
Start: 2019-05-17 | End: 2019-05-17

## 2019-05-17 RX ORDER — GLUCAGON 1 MG
1 KIT INJECTION
Status: DISCONTINUED | OUTPATIENT
Start: 2019-05-17 | End: 2019-05-17

## 2019-05-17 RX ORDER — SYRINGE-NEEDLE,INSULIN,0.5 ML 27GX1/2"
1 SYRINGE, EMPTY DISPOSABLE MISCELLANEOUS 4 TIMES DAILY
Qty: 100 EACH | Refills: 1 | COMMUNITY
Start: 2019-05-17 | End: 2019-05-17 | Stop reason: SDUPTHER

## 2019-05-17 RX ORDER — IBUPROFEN 200 MG
24 TABLET ORAL
Status: DISCONTINUED | OUTPATIENT
Start: 2019-05-17 | End: 2019-05-17 | Stop reason: HOSPADM

## 2019-05-17 RX ORDER — INSULIN ASPART 100 [IU]/ML
1-10 INJECTION, SOLUTION INTRAVENOUS; SUBCUTANEOUS
Status: DISCONTINUED | OUTPATIENT
Start: 2019-05-17 | End: 2019-05-17 | Stop reason: HOSPADM

## 2019-05-17 RX ORDER — IBUPROFEN 200 MG
24 TABLET ORAL
Status: DISCONTINUED | OUTPATIENT
Start: 2019-05-17 | End: 2019-05-17

## 2019-05-17 RX ORDER — NAPROXEN SODIUM 220 MG/1
81 TABLET, FILM COATED ORAL DAILY
Status: DISCONTINUED | OUTPATIENT
Start: 2019-05-17 | End: 2019-05-17 | Stop reason: HOSPADM

## 2019-05-17 RX ORDER — SYRINGE-NEEDLE,INSULIN,0.5 ML 27GX1/2"
1 SYRINGE, EMPTY DISPOSABLE MISCELLANEOUS 4 TIMES DAILY
Qty: 100 EACH | Refills: 1 | COMMUNITY
Start: 2019-05-17 | End: 2020-05-20

## 2019-05-17 RX ADMIN — PRENATAL VIT W/ FE FUMARATE-FA TAB 27-0.8 MG 1 TABLET: 27-0.8 TAB at 09:05

## 2019-05-17 RX ADMIN — HUMAN INSULIN 14 UNITS: 100 INJECTION, SUSPENSION SUBCUTANEOUS at 09:05

## 2019-05-17 RX ADMIN — INSULIN ASPART 1 UNITS: 100 INJECTION, SOLUTION INTRAVENOUS; SUBCUTANEOUS at 11:05

## 2019-05-17 RX ADMIN — INSULIN ASPART 6 UNITS: 100 INJECTION, SOLUTION INTRAVENOUS; SUBCUTANEOUS at 12:05

## 2019-05-17 RX ADMIN — INSULIN ASPART 6 UNITS: 100 INJECTION, SOLUTION INTRAVENOUS; SUBCUTANEOUS at 09:05

## 2019-05-17 RX ADMIN — ASPIRIN 81 MG 81 MG: 81 TABLET ORAL at 09:05

## 2019-05-17 NOTE — DISCHARGE SUMMARY
[facility]  Obstetrics & Gynecology  Discharge Summary    Patient Name: Tonya Rivas  MRN: [id number]  Admission Date: 2019  Hospital Length of Stay: 1 days  Discharge Date and Time:  2019 12:43 PM  Attending Physician: William Herrera MD   Discharging Provider: Sully Flores MD  Primary Care Provider: Hernando Duncan MD    HPI:  Tonya Rivas is a  at 5w4d by LMP who presents as direct admission from clinic for blood glucose patterning. Pt with h/o DM2, most recent regimen included metformin 1000mg BID and trulicity. Pt stopped taking both medications when she found out that she was pregnant, but did begin taking metforming 1000mg BID again 2 days ago. Pt has been on insulin in the past.     Hospital Course:  Patient direct admitted from clinic for titration of BG in the setting on uncontrolled type 2 DM. Patient is newly pregnant. Roughly 5w3d by LMP.  on admit. Started on NPH , Aspart . BG improved but still elevated. Fasting 183. Patient's daughter's high school graduation is tonight and she would really like to attend.     She is an RN and highly motivated. Patient has previously been on insulin before. She has a glucometer at home and already knows how to take her BG. I feel that she is compliant and educated about her DM.     We will titrate up insulin to NPH , Aspart . She has a f/u apt with MFM next week and diabetes education. She is aware of BG goals.     Consults (From admission, onward)        Status Ordering Provider     Inpatient consult to Diabetes educator  Once     Provider:  (Not yet assigned)    Completed SULLY FLORES          Significant Diagnostic Studies: Labs:   CMP   Recent Labs   Lab 19  0859      K 3.9      CO2 24   *   BUN 10   CREATININE 0.7   CALCIUM 9.7   PROT 7.6   ALBUMIN 3.7   BILITOT 0.6   ALKPHOS 107   AST 23   ALT 41   ANIONGAP 9   ESTGFRAFRICA >60.0   EGFRNONAA >60.0   , CBC   Recent Labs    Lab 05/16/19  0859   WBC 5.72   HGB 14.6   HCT 42.8       and A1C:   Recent Labs   Lab 12/06/18  1008 04/01/19  0733 05/16/19  0859   HGBA1C 9.7* 8.7* 9.5*       Pending Diagnostic Studies:     None        Final Active Diagnoses:    Diagnosis Date Noted POA    PRINCIPAL PROBLEM:  Type 2 diabetes mellitus affecting pregnancy in first trimester, antepartum [O24.111] 05/16/2019 Yes    History of pre-eclampsia in prior pregnancy, currently pregnant [O09.299] 05/16/2019 Not Applicable    Elderly multigravida [O09.529] 05/16/2019 Yes      Problems Resolved During this Admission:        Discharged Condition: good    Disposition: Home or Self Care    Follow Up:  Follow-up Information     Yolande Marcus MD. Go on 5/22/2019.    Specialty:  Maternal and Fetal Medicine  Why:  prental visit, blood gluocse check   Contact information:  [address] [place] [id number]  [phone]                 Patient Instructions:      Notify your health care provider if you experience any of the following:  temperature >100.4     Notify your health care provider if you experience any of the following:  persistent nausea and vomiting or diarrhea     Notify your health care provider if you experience any of the following:  severe uncontrolled pain     Notify your health care provider if you experience any of the following:  redness, tenderness, or signs of infection (pain, swelling, redness, odor or green/yellow discharge around incision site)     Notify your health care provider if you experience any of the following:  difficulty breathing or increased cough     Notify your health care provider if you experience any of the following:  severe persistent headache     Notify your health care provider if you experience any of the following:  worsening rash     Notify your health care provider if you experience any of the following:  persistent dizziness, light-headedness, or visual disturbances     Notify your health care  "provider if you experience any of the following:     Notify your health care provider if you experience any of the following:  increased confusion or weakness     Activity as tolerated     Medications:  Reconciled Home Medications:      Medication List      START taking these medications    insulin lispro 100 unit/mL injection  Commonly known as:  HumaLOG U-100 Insulin  Inject 8 Units into the skin 3 (three) times daily before meals.     insulin  unit/mL injection  Commonly known as:  HumuLIN N NPH U-100 Insulin  Inject 16 Units into the skin before breakfast. 16 units before breakfast 14 units in the evening before bed     insulin syringe-needle U-100 1/2 mL 28 gauge x 1/2" Syrg  Commonly known as:  BD LO-DOSE MICRO-FINE IV  1 each by Misc.(Non-Drug; Combo Route) route 4 (four) times daily.  Replaces:  BD INSULIN SYRINGE ULTRA-FINE 0.5 mL 31 gauge x 5/16" Syrg        STOP taking these medications    aluminum chloride 20 % external solution  Commonly known as:  DRYSOL DAB-O-MATIC     BD INSULIN SYRINGE ULTRA-FINE 0.5 mL 31 gauge x 5/16" Syrg  Generic drug:  insulin syringe-needle U-100  Replaced by:  insulin syringe-needle U-100 1/2 mL 28 gauge x 1/2" Syrg     blood sugar diagnostic Strp     calcipotriene 0.005 % cream  Commonly known as:  DOVONOX     fluconazole 150 MG Tab  Commonly known as:  DIFLUCAN     lancets Misc     metFORMIN 1000 MG tablet  Commonly known as:  GLUCOPHAGE     needle (disp) 31 gauge 31 gauge x 5/16" Ndle     pen needle, diabetic 32 gauge x 1/5" Ndle  Commonly known as:  NOVOTWIST     triamcinolone acetonide 0.1% 0.1 % cream  Commonly known as:  KENALOG     TRULICITY 1.5 mg/0.5 mL Pnij  Generic drug:  dulaglutide            Sully Bryant MD  Obstetrics & Gynecology  [facility]  "

## 2019-05-17 NOTE — ASSESSMENT & PLAN NOTE
- h/o delivery at 25w and 26w for severe preeclampsia (HA, visual changes)  - baseline P:C ratio 0.09  - daily ASA

## 2019-05-17 NOTE — ASSESSMENT & PLAN NOTE
- diagnosed after last pregnancy  - A1c 9.5 on 5/16  - beta hydroxybutyrate 0.3, no signs of DKA   - admission glucose 231, administered 3u aspart  - NPH: 14/12  - aspart 6/6/6 with  Meals  - POCT glucose check fasting, 2h post prandial and 2AM  - baseline P:C 0.09  - Cr. 0.7

## 2019-05-17 NOTE — HPI
Tonya Rivas is a  at 5w4d by LMP who presents as direct admission from clinic for blood glucose patterning. Pt with h/o DM2, most recent regimen included metformin 1000mg BID and trulicity. Pt stopped taking both medications when she found out that she was pregnant, but did begin taking metforming 1000mg BID again 2 days ago. Pt has been on insulin in the past.

## 2019-05-17 NOTE — PLAN OF CARE
Problem: Adult Inpatient Plan of Care  Goal: Plan of Care Review  Outcome: Ongoing (interventions implemented as appropriate)  Plan of care reviewed with patient. Dr. Ny came evaluate and assess pt last night. Checking BS as ordered and giving insulin as directed. All belongings at bedside call light within reach. Will continue to monitor.

## 2019-05-17 NOTE — PROGRESS NOTES
Pt given discharge instructions, pt verbalized understanding of instructions. Prescriptions sent to pt's pharmacy and pt will . Family at bedside.

## 2019-05-17 NOTE — SUBJECTIVE & OBJECTIVE
Interval History:   Doing well. No complaints. Denies nausea/vomiting, vaginal bleeding or cramping. Would really like to go to daughter's high school graduation tonight. Patient has been on insulin before, feels comfortable with injections and logging BG.     Scheduled Meds:   aspirin  81 mg Oral Daily    insulin aspart U-100  6 Units Subcutaneous with breakfast    insulin aspart U-100  6 Units Subcutaneous with lunch    insulin aspart U-100  6 Units Subcutaneous with dinner    insulin NPH  12 Units Subcutaneous QHS    insulin NPH  14 Units Subcutaneous Before breakfast    prenatal vitamin  1 tablet Oral Daily     Continuous Infusions:  PRN Meds:dextrose 50%, dextrose 50%, diphenhydrAMINE, diphenhydrAMINE, glucagon (human recombinant), glucose, glucose, insulin aspart U-100, ondansetron, promethazine (PHENERGAN) IVPB, senna-docusate 8.6-50 mg, simethicone    Review of patient's allergies indicates:  No Known Allergies    Objective:     Vital Signs (Most Recent):  Temp: 96.4 °F (35.8 °C) (05/16/19 1932)  Pulse: 86 (05/17/19 0408)  Resp: 16 (05/17/19 0408)  BP: 126/76 (05/17/19 0408)  SpO2: 98 % (05/17/19 0408) Vital Signs (24h Range):  Temp:  [96.4 °F (35.8 °C)] 96.4 °F (35.8 °C)  Pulse:  [86-92] 86  Resp:  [16-18] 16  SpO2:  [98 %-100 %] 98 %  BP: (126-147)/(76-92) 126/76     Weight: 95.7 kg (211 lb)  Body mass index is 32.08 kg/m².  No LMP recorded.    I&O (Last 24H):  No intake or output data in the 24 hours ending 05/17/19 0633    Physical Exam:   Constitutional: She is oriented to person, place, and time. She appears well-developed and well-nourished. No distress.    HENT:   Head: Normocephalic and atraumatic.      Cardiovascular: Normal rate and regular rhythm.     Pulmonary/Chest: Effort normal.        Abdominal: Soft. She exhibits no distension. There is no tenderness.             Musculoskeletal: Normal range of motion. She exhibits no edema.       Neurological: She is alert and oriented to person,  place, and time.    Skin: Skin is warm and dry.    Psychiatric: She has a normal mood and affect. Her behavior is normal. Judgment and thought content normal.       Laboratory:  CBC:   Recent Labs   Lab 05/16/19  0859   WBC 5.72   RBC 5.11   HGB 14.6   HCT 42.8      MCV 84   MCH 28.6   MCHC 34.1     CMP:   Recent Labs   Lab 05/16/19  0859   *   CALCIUM 9.7   ALBUMIN 3.7   PROT 7.6      K 3.9   CO2 24      BUN 10   CREATININE 0.7   ALKPHOS 107   ALT 41   AST 23   BILITOT 0.6       Diagnostic Results:

## 2019-05-17 NOTE — PROGRESS NOTES
[facility]  Obstetrics & Gynecology  Progress Note    Patient Name: Tonya Rivas  MRN: [id number]  Admission Date: 2019  Primary Care Provider: Hernando Duncan MD  Principal Problem: <principal problem not specified>    Subjective:     HPI:  Tonya Rivas is a  at 5w4d by LMP who presents as direct admission from clinic for blood glucose patterning. Pt with h/o DM2, most recent regimen included metformin 1000mg BID and trulicity. Pt stopped taking both medications when she found out that she was pregnant, but did begin taking metforming 1000mg BID again 2 days ago. Pt has been on insulin in the past.     Interval History:   Doing well. No complaints. Denies nausea/vomiting, vaginal bleeding or cramping. Would really like to go to daughter's high school graduation tonight. Patient has been on insulin before, feels comfortable with injections and logging BG.     Scheduled Meds:   aspirin  81 mg Oral Daily    insulin aspart U-100  6 Units Subcutaneous with breakfast    insulin aspart U-100  6 Units Subcutaneous with lunch    insulin aspart U-100  6 Units Subcutaneous with dinner    insulin NPH  12 Units Subcutaneous QHS    insulin NPH  14 Units Subcutaneous Before breakfast    prenatal vitamin  1 tablet Oral Daily     Continuous Infusions:  PRN Meds:dextrose 50%, dextrose 50%, diphenhydrAMINE, diphenhydrAMINE, glucagon (human recombinant), glucose, glucose, insulin aspart U-100, ondansetron, promethazine (PHENERGAN) IVPB, senna-docusate 8.6-50 mg, simethicone    Review of patient's allergies indicates:  No Known Allergies    Objective:     Vital Signs (Most Recent):  Temp: 96.4 °F (35.8 °C) (19)  Pulse: 86 (198)  Resp: 16 (19)  BP: 126/76 (19 0408)  SpO2: 98 % (19) Vital Signs (24h Range):  Temp:  [96.4 °F (35.8 °C)] 96.4 °F (35.8 °C)  Pulse:  [86-92] 86  Resp:  [16-18] 16  SpO2:  [98 %-100 %] 98 %  BP: (126-147)/(76-92) 126/76      Weight: 95.7 kg (211 lb)  Body mass index is 32.08 kg/m².  No LMP recorded.    I&O (Last 24H):  No intake or output data in the 24 hours ending 05/17/19 0633    Physical Exam:   Constitutional: She is oriented to person, place, and time. She appears well-developed and well-nourished. No distress.    HENT:   Head: Normocephalic and atraumatic.      Cardiovascular: Normal rate and regular rhythm.     Pulmonary/Chest: Effort normal.        Abdominal: Soft. She exhibits no distension. There is no tenderness.             Musculoskeletal: Normal range of motion. She exhibits no edema.       Neurological: She is alert and oriented to person, place, and time.    Skin: Skin is warm and dry.    Psychiatric: She has a normal mood and affect. Her behavior is normal. Judgment and thought content normal.       Laboratory:  CBC:   Recent Labs   Lab 05/16/19  0859   WBC 5.72   RBC 5.11   HGB 14.6   HCT 42.8      MCV 84   MCH 28.6   MCHC 34.1     CMP:   Recent Labs   Lab 05/16/19  0859   *   CALCIUM 9.7   ALBUMIN 3.7   PROT 7.6      K 3.9   CO2 24      BUN 10   CREATININE 0.7   ALKPHOS 107   ALT 41   AST 23   BILITOT 0.6       Diagnostic Results:      Assessment/Plan:     Type 2 diabetes mellitus affecting pregnancy in first trimester, antepartum  - diagnosed after last pregnancy  - A1c 9.5 on 5/16  - beta hydroxybutyrate 0.3, no signs of DKA   - admission glucose 231, administered 3u aspart  - NPH: 14/12  - aspart 6/6/6 with  Meals  - POCT glucose check fasting, 2h post prandial and 2AM  - BG overnight 191-183  - baseline P:C 0.09  - Cr. 0.7  - believe patient is compliant and could potentially be started on insulin outpatient, will discuss with staff      History of pre-eclampsia in prior pregnancy, currently pregnant  - h/o delivery at 25w and 26w for severe preeclampsia (HA, visual changes)  - baseline P:C ratio 0.09  - daily ASA starting at 12 weeks  - BP: (126-147)/(76-92) 126/76    Given high  risk pregnancy with insulin dependent DM, HLD, CHTN with h/o of preE, h/o of  delivery, patient will need to be followed by MFM throughout pregnancy. Patient aware and understands.     Sully Bryant MD  Obstetrics & Gynecology  [facility]

## 2019-05-17 NOTE — H&P
[facility]  Obstetrics & Gynecology  History & Physical    Patient Name: Tonya Rivas  MRN: [id number]  Admission Date: 2019  Primary Care Provider: Hernando Duncan MD    Subjective:     Chief Complaint/Reason for Admission: Glucose patterning    History of Present Illness:  Tonya Rivas is a  at 5w4d by LMP who presents as direct admission from clinic for blood glucose patterning. Pt with h/o DM2, most recent regimen included metformin 1000mg BID and trulicity. Pt stopped taking both medications when she found out that she was pregnant, but did begin taking metforming 1000mg BID again 2 days ago. Pt has been on insulin in the past.         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    BD INSULIN  "SYRINGE ULTRA-FINE 1/2 mL 31 x 5/16" Syrg     blood sugar diagnostic Strp Checks 3 times daily for Relion Prime meter    blood sugar diagnostic Strp 1 each by Misc.(Non-Drug; Combo Route) route continuous prn.    calcipotriene (DOVONOX) 0.005 % cream Apply to affected areas of hands BID prn psoriasis    lancets Misc 1 Device by Misc.(Non-Drug; Combo Route) route 2 (two) times daily. Tests 3 times daily    metFORMIN (GLUCOPHAGE) 1000 MG tablet TAKE 1 TABLET (1,000 MG TOTAL) BY MOUTH 2 (TWO) TIMES DAILY WITH MEALS.    needle, disp, 31 gauge 31 gauge x 5/16" Ndle 1 each by Misc.(Non-Drug; Combo Route) route once daily.    pen needle, diabetic (NOVOTWIST) 32 gauge x 1/5" Ndle Check blood sugar QID    TRULICITY 1.5 mg/0.5 mL PnIj INJECT 1.5 MG INTO THE SKIN EVERY 7 DAYS.       Review of patient's allergies indicates:  No Known Allergies     Family History     Problem Relation (Age of Onset)    Cancer Mother    Heart disease Maternal Grandfather    Learning disabilities Son        Tobacco Use    Smoking status: Never Smoker    Smokeless tobacco: Never Used   Substance and Sexual Activity    Alcohol use: No     Comment: social    Drug use: No    Sexual activity: Yes     Partners: Male     Birth control/protection: None     Review of Systems   Constitutional: Negative for chills and fever.   Eyes: Negative for visual disturbance.   Respiratory: Negative for shortness of breath.    Cardiovascular: Negative for chest pain and palpitations.   Gastrointestinal: Negative for abdominal pain, constipation, diarrhea, nausea and vomiting.   Genitourinary: Negative for vaginal bleeding and vaginal discharge.   Musculoskeletal: Negative for back pain.   Integumentary:  Negative for rash.   Neurological: Negative for seizures, syncope and headaches.   Hematological: Does not bruise/bleed easily.   Psychiatric/Behavioral: Negative for depression. The patient is not nervous/anxious.       Objective:     Vital Signs (Most " Recent):  Temp: 96.4 °F (35.8 °C) (05/16/19 1932)  Pulse: 92 (05/16/19 1932)  Resp: 18 (05/16/19 1932)  BP: (!) 147/92 (05/16/19 1932)  SpO2: 100 % (05/16/19 1932) Vital Signs (24h Range):  Temp:  [96.4 °F (35.8 °C)] 96.4 °F (35.8 °C)  Pulse:  [92] 92  Resp:  [18] 18  SpO2:  [100 %] 100 %  BP: (128-147)/(84-92) 147/92     Weight: 95.7 kg (211 lb)  Body mass index is 32.08 kg/m².    No LMP recorded.    Physical Exam:   Constitutional: She is oriented to person, place, and time. She appears well-developed and well-nourished. No distress.    HENT:   Head: Normocephalic and atraumatic.   Nose: No epistaxis.    Eyes: Conjunctivae and EOM are normal.    Neck: Normal range of motion. Neck supple. No thyromegaly present.    Cardiovascular: Normal rate and regular rhythm.     Pulmonary/Chest: Effort normal. No respiratory distress.        Abdominal: Soft. She exhibits no distension. There is no tenderness.             Musculoskeletal: Normal range of motion and moves all extremeties. She exhibits no edema or tenderness.       Neurological: She is alert and oriented to person, place, and time.    Skin: Skin is warm and dry. No rash noted.    Psychiatric: She has a normal mood and affect. Her behavior is normal.       Laboratory:  I have personallly reviewed all pertinent lab results from the last 24 hours.        Assessment/Plan:     Type 2 diabetes mellitus affecting pregnancy in first trimester, antepartum  - diagnosed after last pregnancy  - A1c 9.5 on 5/16  - beta hydroxybutyrate 0.3, no signs of DKA   - admission glucose 231, administered 3u aspart  - NPH: 14/12  - aspart 6/6/6 with  Meals  - POCT glucose check fasting, 2h post prandial and 2AM  - baseline P:C 0.09  - Cr. 0.7        History of pre-eclampsia in prior pregnancy, currently pregnant  - h/o delivery at 25w and 26w for severe preeclampsia (HA, visual changes)  - baseline P:C ratio 0.09  - daily ASA          Nara F Ny, MD  Obstetrics &  Gynecology  [facility]

## 2019-05-17 NOTE — CONSULTS
Patient seen by DM ed. Brought information on managing diabetes through pregnancy. Reviewed types of insulin used to control BG. 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 Reviewed insulin schedule/regime.     Log sheets provided - ed on BG goals in pregnancy. Discussed importance of sending logs to OB-GYN for frequent insulin titration if needed.     Reviewed CHO per meal goal in pregnancy and ways to get nutritious, high fiber carbs in diet.     All questions answered at this time. Encouraged pt to call with questions as needed.

## 2019-05-17 NOTE — SUBJECTIVE & OBJECTIVE
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    BD INSULIN SYRINGE ULTRA-FINE 1/2 mL 31 x 5/16" Syrg     blood sugar diagnostic Strp Checks 3 times daily for Relion Prime meter    blood sugar diagnostic Strp 1 each by Misc.(Non-Drug; Combo Route) route continuous prn.    calcipotriene (DOVONOX) 0.005 % cream Apply to affected areas of hands BID prn psoriasis    lancets Misc 1 Device by Misc.(Non-Drug; Combo Route) route 2 (two) times daily. Tests 3 times daily    metFORMIN (GLUCOPHAGE) 1000 MG tablet TAKE 1 TABLET (1,000 MG TOTAL) BY MOUTH 2 (TWO) TIMES DAILY WITH MEALS.    needle, disp, 31 gauge 31 gauge x 5/16" Ndle 1 each by Misc.(Non-Drug; Combo Route) route once daily.    pen needle, diabetic (NOVOTWIST) 32 gauge x " "1/5" Ndle Check blood sugar QID    TRULICITY 1.5 mg/0.5 mL PnIj INJECT 1.5 MG INTO THE SKIN EVERY 7 DAYS.       Review of patient's allergies indicates:  No Known Allergies     Family History     Problem Relation (Age of Onset)    Cancer Mother    Heart disease Maternal Grandfather    Learning disabilities Son        Tobacco Use    Smoking status: Never Smoker    Smokeless tobacco: Never Used   Substance and Sexual Activity    Alcohol use: No     Comment: social    Drug use: No    Sexual activity: Yes     Partners: Male     Birth control/protection: None     Review of Systems   Constitutional: Negative for chills and fever.   Eyes: Negative for visual disturbance.   Respiratory: Negative for shortness of breath.    Cardiovascular: Negative for chest pain and palpitations.   Gastrointestinal: Negative for abdominal pain, constipation, diarrhea, nausea and vomiting.   Genitourinary: Negative for vaginal bleeding and vaginal discharge.   Musculoskeletal: Negative for back pain.   Integumentary:  Negative for rash.   Neurological: Negative for seizures, syncope and headaches.   Hematological: Does not bruise/bleed easily.   Psychiatric/Behavioral: Negative for depression. The patient is not nervous/anxious.       Objective:     Vital Signs (Most Recent):  Temp: 96.4 °F (35.8 °C) (05/16/19 1932)  Pulse: 92 (05/16/19 1932)  Resp: 18 (05/16/19 1932)  BP: (!) 147/92 (05/16/19 1932)  SpO2: 100 % (05/16/19 1932) Vital Signs (24h Range):  Temp:  [96.4 °F (35.8 °C)] 96.4 °F (35.8 °C)  Pulse:  [92] 92  Resp:  [18] 18  SpO2:  [100 %] 100 %  BP: (128-147)/(84-92) 147/92     Weight: 95.7 kg (211 lb)  Body mass index is 32.08 kg/m².    No LMP recorded.    Physical Exam:   Constitutional: She is oriented to person, place, and time. She appears well-developed and well-nourished. No distress.    HENT:   Head: Normocephalic and atraumatic.   Nose: No epistaxis.    Eyes: Conjunctivae and EOM are normal.    Neck: Normal range of " motion. Neck supple. No thyromegaly present.    Cardiovascular: Normal rate and regular rhythm.     Pulmonary/Chest: Effort normal. No respiratory distress.        Abdominal: Soft. She exhibits no distension. There is no tenderness.             Musculoskeletal: Normal range of motion and moves all extremeties. She exhibits no edema or tenderness.       Neurological: She is alert and oriented to person, place, and time.    Skin: Skin is warm and dry. No rash noted.    Psychiatric: She has a normal mood and affect. Her behavior is normal.       Laboratory:  I have personallly reviewed all pertinent lab results from the last 24 hours.

## 2019-05-18 LAB — BACTERIA UR CULT: NORMAL

## 2019-05-22 ENCOUNTER — PROCEDURE VISIT (OUTPATIENT)
Dept: MATERNAL FETAL MEDICINE | Facility: CLINIC | Age: 39
End: 2019-05-22
Payer: COMMERCIAL

## 2019-05-22 ENCOUNTER — INITIAL CONSULT (OUTPATIENT)
Dept: MATERNAL FETAL MEDICINE | Facility: CLINIC | Age: 39
End: 2019-05-22
Payer: COMMERCIAL

## 2019-05-22 VITALS
BODY MASS INDEX: 32.48 KG/M2 | SYSTOLIC BLOOD PRESSURE: 118 MMHG | DIASTOLIC BLOOD PRESSURE: 76 MMHG | WEIGHT: 213.63 LBS

## 2019-05-22 DIAGNOSIS — O24.911 DIABETES MELLITUS AFFECTING PREGNANCY, FIRST TRIMESTER: ICD-10-CM

## 2019-05-22 DIAGNOSIS — E11.65 UNCONTROLLED TYPE 2 DIABETES MELLITUS WITH HYPERGLYCEMIA: Primary | ICD-10-CM

## 2019-05-22 PROCEDURE — 3046F HEMOGLOBIN A1C LEVEL >9.0%: CPT | Mod: CPTII,S$GLB,, | Performed by: PEDIATRICS

## 2019-05-22 PROCEDURE — 3008F BODY MASS INDEX DOCD: CPT | Mod: CPTII,S$GLB,, | Performed by: PEDIATRICS

## 2019-05-22 PROCEDURE — 76817 TRANSVAGINAL US OBSTETRIC: CPT | Mod: S$GLB,,, | Performed by: PEDIATRICS

## 2019-05-22 PROCEDURE — 76817 PR US, OB, TRANSVAG APPROACH: ICD-10-PCS | Mod: S$GLB,,, | Performed by: PEDIATRICS

## 2019-05-22 PROCEDURE — 99205 PR OFFICE/OUTPT VISIT, NEW, LEVL V, 60-74 MIN: ICD-10-PCS | Mod: 25,S$GLB,, | Performed by: PEDIATRICS

## 2019-05-22 PROCEDURE — 99999 PR PBB SHADOW E&M-EST. PATIENT-LVL III: CPT | Mod: PBBFAC,,, | Performed by: PEDIATRICS

## 2019-05-22 PROCEDURE — 3008F PR BODY MASS INDEX (BMI) DOCUMENTED: ICD-10-PCS | Mod: CPTII,S$GLB,, | Performed by: PEDIATRICS

## 2019-05-22 PROCEDURE — 99999 PR PBB SHADOW E&M-EST. PATIENT-LVL III: ICD-10-PCS | Mod: PBBFAC,,, | Performed by: PEDIATRICS

## 2019-05-22 PROCEDURE — 99205 OFFICE O/P NEW HI 60 MIN: CPT | Mod: 25,S$GLB,, | Performed by: PEDIATRICS

## 2019-05-22 PROCEDURE — 3046F PR MOST RECENT HEMOGLOBIN A1C LEVEL > 9.0%: ICD-10-PCS | Mod: CPTII,S$GLB,, | Performed by: PEDIATRICS

## 2019-05-22 NOTE — PROGRESS NOTES
Pt presented to [facility] for Dating ultrasound and New Consult with Dr. Marcus for blood sugar management, pt has blood sugar log with her, pt is not consistent with the amount of insulin she takes, pt changes dose of insulin with each administration based on her blood sugar- will scan into Epic pt's blood sugars and her recorded dosages of insulin, after consult with Dr. Marcus pt given a set insulin regimen per MD as follows:    Breakfast / Lunch / Dinner / Bedtime    NPH    42 / X / X / 30    (Breakfast, Lunch, Dinner, Bedtime)  Aspart 16 / X / 18 / X        Pt understands plan of care and insulin regimen, pt booked for f/u appts every 2 weeks in June per request/recommendation of Dr. Marcus, once July schedule opens in [facility] pt will be booked for f/u's continuing every 2 weeks per MD.

## 2019-05-22 NOTE — PROGRESS NOTES
Indication  ========    R/T MD Dating/BS check/AMA/from hospital admit    History  ======    Previous Outcomes   4  Para 3  Marx living children (L) 2  Preg. no. 1  Outcome: elective termination  Preg. no. 2  Outcome: antepartum stillbirth  Gest. age 25 w + 0 d  Details: C/S; 1 lb 9 oz  Preg. no. 3  Outcome: live birth  Gest. age 26 w + 0 d  Details: ; C/S  Preg. no. 4  Outcome: live birth  Gest. age 35 w + 0 d  Details: C/S; 6 lbs 10 oz  Risk Factors  History risk factors: AMA  History risk factors: Diabetes mellitus  Details: Type II  Details: Obesity  Details: Chronic Kidney Disease  Details: Gastric Band    Maternal Assessment  =================    Weight 97 kg  Weight (lb) 214 lb  BP syst 118 mmHg  BP diast 76 mmHg    Pregnancy  =========    Marx pregnancy. Number of gestational sacs: 1    Dating  ======    LMP on: 2019  GA by LMP 6 w + 2 d  DO by LMP: 2020  Ultrasound examination on: 2019  GA by U/S based upon: CRL  GA by U/S 6 w + 2 d  DO by U/S: 2020  Assigned: based on the LMP, selected on 2019  Assigned GA 6 w + 2 d  Assigned DO: 2020  Pregnancy length 280 d    Assessment  ==========    Gestational sac: visualized  Location: intrauterine  Yolk sac: visualized  Amniotic sac: visualized  Embryo: visualized  CRL 5.2 mm  6w 2d        Hadlock    Cardiac activity: present   bpm      Maternal Structures  ===============    Ovaries / Tubes / Adnexa  Rt ovary: Visualized  Rt ovary details: probably normal  Rt ovary D1 39 mm  Rt ovary D2 27 mm  Rt ovary D3 26 mm  Rt ovary Vol 14.4 cm³  Lt ovary: Not visualized  Pouch of Blake / Other Structures  Cul de Sac: Visualized  Free fluid: Free fluid visualized  Amount of free fluid: mild            Consultation  ==========    Ms. Tonya Rivas presents for a dating scan and consultation of diabetes as follow-up from hospital admit for her diabetes.    The patient is a 38-year-old  4 para 0 3 1 to female  at 6 weeks 2 days'. Dating today puts her at 6 weeks 2 days gestation. Fetus is  viable with a fetal heart rate of 129 beats per minute. She is here for consultation regarding AMA and for follow-up of her type 2 diabetes.  Current hemoglobin A1c is 9.5 on . Her admission showed no signs of DKA although her blood sugars were quite elevated. There was a  comment about renal disease, but creatinine is 0.7 and BUN is 10. AST and ALT are within normal limits. Urine has negative protein and  protein / creatinine ratio is normal.      Medical History:  1. Type 2 diabetes diagnosed in . Following hospitalization, she was on lispro 8 units 3 times a day with meals and NPH 16 units with  breakfast and 14 units at HS. Prior to hospitalization, she was on metformin 1000 mg BID and trulicity.  2. Fatty Liver  3. AMA  4. Class I obesity  5. Migraines  6. PTD x 3 at 25, 26 and 35 weeks' respectively.  7. 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 HSV        Surgical History:  1. Gastric Band  2. C/S x3  3. Lithotripsy    Social History: Denies smoking or AODA    Family History: Negative for birth defects (other than her personal history) or syndromic abnormalities. +for cancer, heart disease, learning  disabilities    OB History:    1. EAB  2. : 25 WEEK, 1 LB 9 OZ,  OF BABY WITH LISSENCEPHALY  3. : 26 WEEK DELIVERY BY  SECTION OF A FEMALE INFANT  4. : 35 WEEK, 6 LB 10 OZ,  OF A FEMALE INFANT  5. Current pregnancy      Ideally, women with pregestational diabetes will have received preconceptional counseling to assess their baseline medical status and educate  them about the management and potential complications of diabetes in pregnancy.    In addition to routine prenatal testing, assessment of the diabetic  should include: glycated hemoglobin concentration, baseline renal  function, thyrotropin and free thyroxine, electrocardiogram, dilated and comprehensive eye examination  by an ophthalmologist, and first  trimester ultrasound examination if pregnancy dating is uncertain.    Today the patient was counseled on the risks of diabetes in pregnancy. I reviewed the physiologic effects of pregnancy on diabetes. The need  for meticulous glucose control was emphasized. I discussed with the patient the increased risks of fetal structural anomalies, macrosomia,  prematurity, shoulder dystocia,  hyperbilirubinemia, and sudden stillbirth in those patients with poor glucose control. I also discussed  with the patient the need for increased fetal surveillance with monthly fetal growth assessments and once to twice weekly third trimester fetal  testing. I also reviewed the possibility of need for early delivery in those with poor glucose control.        General information and recommendations from our MFM group at [facility]:    Diet and/or medication should be used to keep fasting glucose levels <90 mg/dL and 2 hour postprandial levels <120 mg/dL. If you use one  hour postprandial levels, we recommend keeping the value <130 mg/dl. Insulin is our recommended first line therapy for those who are unable  to control glucose with diet alone. Information on diet, insulin therapy, exercise and glucose monitoring should be provided early with frequent  follow up by CDE and MFM.      RECOMMENDATIONS:    1. Monthly HgbA1c - goal is <6%. Recommended labs below. All should be performed as baseline for patient.  2. Strict insulin control. She is doing 2 hour PP; I instructed her to begin timing at first bite of meal. I would also recommend consideration of 1  hour PP; they more closely correlate with actual levels of hyperglycemia.  3. Recommend genetic screening. Also maternal serum aFP should definitively be drawn at 16+ weeks. Patient will have cfDNA or FTS dependent on insurance.  4. US for growth q. 3 - 4 weeks after anatomic survey at 20 weeks.  5. Fetal echo at ~22 - 24 weeks.  6. APT twice weekly  starting at 32 weeks or earlier if clinically indicated.  7. Baseline and monthly assessment of renal function and careful assessment of BP.  8. Opthalmology visit at least twice during gestation - immediately prior or early in pregnancy and in third trimester or as recommended by  opthalmologist.  9. Baseline EKG.  10. Initiation of ASA 81 mg po daily after 12 weeks'. Folic acid: a minimum of 400 micrograms po daily from pre pregnancy at least through 16  weeks'.  11. Nifedipine or indomethacin is recommended for tocolysis of  labor. If steroid course is given, BS should be monitored hourly from 4 -  6 hours after initial injection to 24 - 36 hours following second injection.  12. Delivery recommendations (ACOG article by Erica et al ):    -Well controlled pregestational/gestational diabetes: do not recommend late  or early term delivery  -Pregestational diabetes with vascular disease: 37-39 weeks  -Pregestational poorly controlled: 34-39 weeks (individualized to situation)  -Gestational poorly controlled on medication: 34-39 weeks (individualized to situation)    13. If the expected fetal weight is >4500 grams, C/S is recommended. Suspected fetal macrosomia is NOT an indication for IOL. This  intervention has not been proven to improve maternal or fetal outcomes and may increase C/S rate.    TEST    FREQUENCY    Hgb A1C   Every 4 to 6 weeks  Blood glucose FBS, One hour or Two hour postprandials, prn  MSafP 16 weeks   Genetic screening  FTS (dating also) + MSafP or quad screen or cfDNA  Urine ketones When any blood glucose value is >200 mg/dL (11.1 mmol/L)  Urine protein   Dipstick at visits, 24 hour q.trimester if nephropathy present  Serum creatinine Each trimester in women with nephropathy  P/Cr    Baseline, q trimester and prn  TFTs    Baseline TSH measurement  Eye examination  Baseline and then as necessary per retinal specialist      Patient has started ASA daily. I reviewed her BS log. I have  made the following changes in her insulin:    NPH/Aspart(lispro) (units): 42/16, x/x, x/18, 30/x.    Pen needles were called in with additional insulin ordered.          I spent 60 min in patient care management and consultation with greater than 50% face-to-face.        Impression  =========    Single viable intrauterine pregnancy consistent with LMP dating.  Embryo grossly WNL with normal cardiac activity.    Normal uterus, cervix and adnexae as noted above.  Small amount of free fluid seen in cul-de-sac.            Recommendation  ==============    See above.    Thank you again for allowing us to participate in the care of your patients. If you have any questions concerning today's consultation, feel free  to contact me or one of my partners. We can be reached at [phone] during normal business hours. If you have a question after normal  business hours, please contact Labor and Delivery at [phone].

## 2019-05-22 NOTE — LETTER
May 28, 2019      RUEL Osuna, JOSE ALFREDO  [address]  [facility] [id number]           [facility] [address]  [address]  [place] [address]  Phone: [phone]          Patient: Tonya Rivas   MR Number: [id number]   YOB: 1980   Date of Visit: 5/22/2019       Dear RUEL Osuna:    Thank you for referring Tonya Rivas to me for evaluation. Attached you will find relevant portions of my assessment and plan of care.    If you have questions, please do not hesitate to call me. I look forward to following Tonya Rivas along with you.    Sincerely,    Yolande Marcus MD    Enclosure  CC:  No Recipients    If you would like to receive this communication electronically, please contact [email or web address] or [phone] to request more information on [employer] access.    For providers and/or their staff who would like to refer a patient to [facility], please contact us through our one-stop-shop provider referral line, [facility], at [phone].    If you feel you have received this communication in error or would no longer like to receive these types of communications, please e-mail [email or web address]

## 2019-05-27 ENCOUNTER — PATIENT MESSAGE (OUTPATIENT)
Dept: MATERNAL FETAL MEDICINE | Facility: CLINIC | Age: 39
End: 2019-05-27

## 2019-05-28 RX ORDER — PEN NEEDLE, DIABETIC 29 G X1/2"
NEEDLE, DISPOSABLE MISCELLANEOUS
Qty: 100 EACH | Refills: 11 | Status: SHIPPED | OUTPATIENT
Start: 2019-05-28 | End: 2020-05-20

## 2019-05-28 RX ORDER — INSULIN LISPRO 100 [IU]/ML
8 INJECTION, SOLUTION INTRAVENOUS; SUBCUTANEOUS
Qty: 15 ML | Refills: 3 | Status: SHIPPED | OUTPATIENT
Start: 2019-05-28 | End: 2019-06-07

## 2019-06-05 ENCOUNTER — PROCEDURE VISIT (OUTPATIENT)
Dept: MATERNAL FETAL MEDICINE | Facility: CLINIC | Age: 39
End: 2019-06-05
Payer: COMMERCIAL

## 2019-06-05 ENCOUNTER — INITIAL CONSULT (OUTPATIENT)
Dept: MATERNAL FETAL MEDICINE | Facility: CLINIC | Age: 39
End: 2019-06-05
Payer: COMMERCIAL

## 2019-06-05 VITALS
BODY MASS INDEX: 34.09 KG/M2 | SYSTOLIC BLOOD PRESSURE: 130 MMHG | WEIGHT: 224.19 LBS | DIASTOLIC BLOOD PRESSURE: 78 MMHG

## 2019-06-05 DIAGNOSIS — E11.65 UNCONTROLLED TYPE 2 DIABETES MELLITUS WITH HYPERGLYCEMIA: Primary | ICD-10-CM

## 2019-06-05 DIAGNOSIS — O24.911 DIABETES MELLITUS AFFECTING PREGNANCY, FIRST TRIMESTER: ICD-10-CM

## 2019-06-05 PROBLEM — N18.9 CHRONIC KIDNEY DISEASE: Status: RESOLVED | Noted: 2019-05-16 | Resolved: 2019-06-05

## 2019-06-05 PROCEDURE — 99214 PR OFFICE/OUTPT VISIT, EST, LEVL IV, 30-39 MIN: ICD-10-PCS | Mod: 25,S$GLB,, | Performed by: PEDIATRICS

## 2019-06-05 PROCEDURE — 99999 PR PBB SHADOW E&M-EST. PATIENT-LVL III: ICD-10-PCS | Mod: PBBFAC,,, | Performed by: PEDIATRICS

## 2019-06-05 PROCEDURE — 76801 OB US < 14 WKS SINGLE FETUS: CPT | Mod: S$GLB,,, | Performed by: PEDIATRICS

## 2019-06-05 PROCEDURE — 99214 OFFICE O/P EST MOD 30 MIN: CPT | Mod: 25,S$GLB,, | Performed by: PEDIATRICS

## 2019-06-05 PROCEDURE — 3008F PR BODY MASS INDEX (BMI) DOCUMENTED: ICD-10-PCS | Mod: CPTII,S$GLB,, | Performed by: PEDIATRICS

## 2019-06-05 PROCEDURE — 99999 PR PBB SHADOW E&M-EST. PATIENT-LVL III: CPT | Mod: PBBFAC,,, | Performed by: PEDIATRICS

## 2019-06-05 PROCEDURE — 3046F PR MOST RECENT HEMOGLOBIN A1C LEVEL > 9.0%: ICD-10-PCS | Mod: CPTII,S$GLB,, | Performed by: PEDIATRICS

## 2019-06-05 PROCEDURE — 3046F HEMOGLOBIN A1C LEVEL >9.0%: CPT | Mod: CPTII,S$GLB,, | Performed by: PEDIATRICS

## 2019-06-05 PROCEDURE — 3008F BODY MASS INDEX DOCD: CPT | Mod: CPTII,S$GLB,, | Performed by: PEDIATRICS

## 2019-06-05 PROCEDURE — 76801 PR US, OB <14WKS, TRANSABD, SINGLE GESTATION: ICD-10-PCS | Mod: S$GLB,,, | Performed by: PEDIATRICS

## 2019-06-05 RX ORDER — NAPROXEN SODIUM 220 MG/1
81 TABLET, FILM COATED ORAL DAILY
Status: ON HOLD | COMMUNITY
End: 2021-02-11 | Stop reason: HOSPADM

## 2019-06-05 RX ORDER — DULAGLUTIDE 1.5 MG/.5ML
INJECTION, SOLUTION SUBCUTANEOUS
COMMUNITY
Start: 2019-05-29 | End: 2019-06-20

## 2019-06-05 RX ORDER — PEN NEEDLE, DIABETIC 31 GX5/16"
NEEDLE, DISPOSABLE MISCELLANEOUS
COMMUNITY
Start: 2019-05-28 | End: 2019-06-07

## 2019-06-05 NOTE — PROGRESS NOTES
Pt presented to [facility] clinic appt today for ultrasound and consult with Dr. Marcus for blood sugar log review and insulin adjustment, pt has blood sugar log with her, copy made for chart and 3 new logs given to pt for future use, pt instructed to please bring blood sugar logs to all MD appts, pt verbalized complete understanding, pt states she takes insulin as follows:       Breakfast / Lunch / Dinner / Bedtime    NPH   42 / X / X / 30    Aspart   16 / X / 18 / X      Insulin adjustments made per Dr. Marcus and pt is aware of changes, new changes to insulin dosage will be written in imaging report per Dr. Marcus.

## 2019-06-06 ENCOUNTER — PATIENT MESSAGE (OUTPATIENT)
Dept: MATERNAL FETAL MEDICINE | Facility: CLINIC | Age: 39
End: 2019-06-06

## 2019-06-06 ENCOUNTER — TELEPHONE (OUTPATIENT)
Dept: OBSTETRICS AND GYNECOLOGY | Facility: CLINIC | Age: 39
End: 2019-06-06

## 2019-06-06 NOTE — PROGRESS NOTES
Indication  ========    Confirmation of cardiac activity    History  ======    Previous Outcomes   4  Para 3  Marx living children (L) 2  Preg. no. 1  Outcome: elective termination  Preg. no. 2  Outcome: live birth  Date: 13  Gest. age 25 w + 0 d  Gender: male  Details: C/S; 1 lb 9 oz passed away at 3yrs of age lisencephaly Pre-E  Preg. no. 3  Outcome: live birth  Date: 01  Gest. age 26 w + 0 d  Gender: male  Details: ; C/S Pre-E  Preg. no. 4  Outcome: live birth  Date: 07  Gest. age 35 w + 0 d  Gender: female  Details: C/S; 6 lbs 10 oz Pre-E, DM  Risk Factors  History risk factors: AMA  History risk factors: Diabetes mellitus  Details: Type II  Details: Obesity  Details: kidney stones  Details: Gastric Band    Method  ======    Transabdominal ultrasound examination, Voluson E10. View: Sufficient    Pregnancy  =========    Marx pregnancy. Number of fetuses: 1    Dating  ======    LMP on: 2019  GA by LMP 8 w + 2 d  DO by LMP: 2020  Ultrasound examination on: 2019  GA by U/S based upon: CRL  GA by U/S 8 w + 0 d  DO by U/S: 1/15/2020  Assigned: based on the LMP, selected on 2019  Assigned GA 8 w + 2 d  Assigned DO: 2020  Pregnancy length 280 d    Assessment  ==========    Gestational sac: visualized  Location: intrauterine  Yolk sac: visualized  Amniotic sac: visualized  Embryo: visualized  CRL 16.6 mm  8w 0d        Hadlock    Cardiac activity: present   bpm      Maternal Structures  ===============    Uterus / Cervix  Uterus: Visualized  Cervix: Visualized  Ovaries / Tubes / Adnexa  Rt ovary: Not visualized  Lt ovary: Not visualized  Pouch of Blake / Other Structures  Free fluid: No free fluid visualized    Consultation  ==========    see initial consult.      Ms. Rivas returns today for evaluation of blood sugars. Fetal viability is ascertained on ultrasound with crown-rump length.    Vital signs: Weight 101.7 kg BP: 130/78 fetal heart rate at  159 beats per minute      I reviewed her BS log. I have made the following changes in her insulin:    NPH/Aspart(lispro) (units): 50/18, x/x, x/24, 38/x.    Pen needles were called in with additional insulin ordered.      I spent 25 min in patient care, management and consultation    Impression  =========    Single viable intrauterine pregnancy consistent with LMP dating.  Embryo grossly WNL with normal cardiac activity.    Normal uterus, cervix and adnexae as noted above.  No fluid seen in cul-de-sac.      Recommendation  ==============    1. Patient has been approved for MaterniT 21. She will be drawn at next visit.  2. Return in 2 weeks for viability and BS check.  3. Fetal Echo ordered.  4. Increase insulin as above.  5. Refills for insulin sent in to Pharmacy.      Thank you again for allowing us to participate in the care of your patients. If you have any questions concerning today's consultation, feel free  to contact me or one of my partners. We can be reached at [phone] during normal business hours. If you have a question after normal  business hours, please contact Labor and Delivery at [phone].

## 2019-06-07 RX ORDER — PEN NEEDLE, DIABETIC 31 GX5/16"
NEEDLE, DISPOSABLE MISCELLANEOUS
Qty: 100 EACH | Refills: 11 | Status: SHIPPED | OUTPATIENT
Start: 2019-06-07 | End: 2019-07-03 | Stop reason: SDUPTHER

## 2019-06-10 ENCOUNTER — PATIENT MESSAGE (OUTPATIENT)
Dept: MATERNAL FETAL MEDICINE | Facility: CLINIC | Age: 39
End: 2019-06-10

## 2019-06-11 DIAGNOSIS — E11.65 UNCONTROLLED TYPE 2 DIABETES MELLITUS WITH HYPERGLYCEMIA: Primary | ICD-10-CM

## 2019-06-15 ENCOUNTER — PATIENT MESSAGE (OUTPATIENT)
Dept: DERMATOLOGY | Facility: CLINIC | Age: 39
End: 2019-06-15

## 2019-06-20 ENCOUNTER — INITIAL CONSULT (OUTPATIENT)
Dept: MATERNAL FETAL MEDICINE | Facility: CLINIC | Age: 39
End: 2019-06-20
Payer: COMMERCIAL

## 2019-06-20 ENCOUNTER — PATIENT MESSAGE (OUTPATIENT)
Dept: MATERNAL FETAL MEDICINE | Facility: CLINIC | Age: 39
End: 2019-06-20

## 2019-06-20 ENCOUNTER — ROUTINE PRENATAL (OUTPATIENT)
Dept: OBSTETRICS AND GYNECOLOGY | Facility: CLINIC | Age: 39
End: 2019-06-20
Payer: COMMERCIAL

## 2019-06-20 ENCOUNTER — PROCEDURE VISIT (OUTPATIENT)
Dept: MATERNAL FETAL MEDICINE | Facility: CLINIC | Age: 39
End: 2019-06-20
Payer: COMMERCIAL

## 2019-06-20 VITALS
BODY MASS INDEX: 34.96 KG/M2 | DIASTOLIC BLOOD PRESSURE: 80 MMHG | SYSTOLIC BLOOD PRESSURE: 142 MMHG | WEIGHT: 229.94 LBS

## 2019-06-20 VITALS
DIASTOLIC BLOOD PRESSURE: 80 MMHG | SYSTOLIC BLOOD PRESSURE: 130 MMHG | BODY MASS INDEX: 34.96 KG/M2 | WEIGHT: 229.94 LBS

## 2019-06-20 DIAGNOSIS — E11.65 UNCONTROLLED TYPE 2 DIABETES MELLITUS WITH HYPERGLYCEMIA: Primary | ICD-10-CM

## 2019-06-20 DIAGNOSIS — O24.911 DIABETES MELLITUS AFFECTING PREGNANCY, FIRST TRIMESTER: ICD-10-CM

## 2019-06-20 DIAGNOSIS — O24.111 TYPE 2 DIABETES MELLITUS AFFECTING PREGNANCY IN FIRST TRIMESTER, ANTEPARTUM: Primary | ICD-10-CM

## 2019-06-20 DIAGNOSIS — O09.91 SUPERVISION OF HIGH RISK PREGNANCY IN FIRST TRIMESTER: ICD-10-CM

## 2019-06-20 PROCEDURE — 99213 PR OFFICE/OUTPT VISIT, EST, LEVL III, 20-29 MIN: ICD-10-PCS | Mod: S$GLB,,, | Performed by: OBSTETRICS & GYNECOLOGY

## 2019-06-20 PROCEDURE — 76817 TRANSVAGINAL US OBSTETRIC: CPT | Mod: S$GLB,,, | Performed by: PEDIATRICS

## 2019-06-20 PROCEDURE — 99213 OFFICE O/P EST LOW 20 MIN: CPT | Mod: S$GLB,,, | Performed by: OBSTETRICS & GYNECOLOGY

## 2019-06-20 PROCEDURE — 99999 PR PBB SHADOW E&M-EST. PATIENT-LVL III: CPT | Mod: PBBFAC,,, | Performed by: PEDIATRICS

## 2019-06-20 PROCEDURE — 99999 PR PBB SHADOW E&M-EST. PATIENT-LVL II: CPT | Mod: PBBFAC,,, | Performed by: OBSTETRICS & GYNECOLOGY

## 2019-06-20 PROCEDURE — 76815 PR  US,PREGNANT UTERUS,LIMITED, 1/> FETUSES: ICD-10-PCS | Mod: S$GLB,,, | Performed by: PEDIATRICS

## 2019-06-20 PROCEDURE — 99999 PR PBB SHADOW E&M-EST. PATIENT-LVL II: ICD-10-PCS | Mod: PBBFAC,,, | Performed by: OBSTETRICS & GYNECOLOGY

## 2019-06-20 PROCEDURE — 76815 OB US LIMITED FETUS(S): CPT | Mod: S$GLB,,, | Performed by: PEDIATRICS

## 2019-06-20 PROCEDURE — 99499 UNLISTED E&M SERVICE: CPT | Mod: S$GLB,,, | Performed by: PEDIATRICS

## 2019-06-20 PROCEDURE — 99999 PR PBB SHADOW E&M-EST. PATIENT-LVL III: ICD-10-PCS | Mod: PBBFAC,,, | Performed by: PEDIATRICS

## 2019-06-20 PROCEDURE — 76817 PR US, OB, TRANSVAG APPROACH: ICD-10-PCS | Mod: S$GLB,,, | Performed by: PEDIATRICS

## 2019-06-20 PROCEDURE — 99499 NO LOS: ICD-10-PCS | Mod: S$GLB,,, | Performed by: PEDIATRICS

## 2019-06-20 NOTE — PROGRESS NOTES
Feeling well. 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 Lives on the .  Fasting 120s, Post Breakfast - 70-80, Post-Lunch 130-140, Post-Dinner 120-130. She has appt with Dr. Marcus today. She lives on the  and would prefer to send in BS logs rather than more frequent visits. She will discuss with Dr. Marcus.  No history of HTN. Today 142/80 but was in a hurry to make appt. Discussed Dx of CHTN if elevated again.   Hx PreE and DM - Baby ASA 12 weeks.   Hx C/S x 3 - will plan for repeat C/S.   Discussed connected mom.  Planning NIPT today at [facility] visit. Does want to know gender.  F/U 4 weeks.

## 2019-06-20 NOTE — PROGRESS NOTES
Pt presented to [facility] for ultrasound and consult with Dr. Marcus for blood sugar review, pt sent her blood sugar log into the patient portal and it was printed for MD to review, pt states she takes insulin as follows      Breakfast / Lunch / Dinner / Bedtime    NPH  50 / X / X / 38    Lispro  18 / X / 24 / X

## 2019-06-21 ENCOUNTER — TELEPHONE (OUTPATIENT)
Dept: OBSTETRICS AND GYNECOLOGY | Facility: CLINIC | Age: 39
End: 2019-06-21

## 2019-06-21 DIAGNOSIS — O02.1 MISSED AB: Primary | ICD-10-CM

## 2019-06-21 RX ORDER — MISOPROSTOL 200 UG/1
800 TABLET ORAL EVERY 4 HOURS
Qty: 12 TABLET | Refills: 0 | Status: SHIPPED | OUTPATIENT
Start: 2019-06-21 | End: 2019-07-03

## 2019-06-21 NOTE — TELEPHONE ENCOUNTER
Spoke with patient regarding missed ab. Discussed expectant, medical and surgical management. She desires to attempt medical management over the weekend. Rx sent to pharmacy. Instructed misoprostol 800 mcg q3-12 hrs vaginal or sublingual for up to 3 doses. Bleeding precautions reviewed. Suction D&C scheduled for 6/24 at 1200 if needed. Discussed with Dr. Orosco - recommended 1/2 NPH night before surgery, aspart dose with dinner. Liquids including gatorade until leaving house am of surgery. All questions answered.

## 2019-06-23 NOTE — PROGRESS NOTES
Indication  ========    F/U Consultation, BS check; viability    History  ======    Previous Outcomes   4  Para 3  Marx living children (L) 2  Preg. no. 1  Outcome: elective termination  Preg. no. 2  Outcome: live birth  Date: 13  Gest. age 25 w + 0 d  Gender: male  Details: C/S; 1 lb 9 oz passed away at 3yrs of age lisencephaly Pre-E  Preg. no. 3  Outcome: live birth  Date: 01  Gest. age 26 w + 0 d  Gender: male  Details: ; C/S Pre-E  Preg. no. 4  Outcome: live birth  Date: 07  Gest. age 35 w + 0 d  Gender: female  Details: C/S; 6 lbs 10 oz Pre-E, DM  Risk Factors  History risk factors: AMA  History risk factors: Diabetes mellitus  Details: Type II  Details: Obesity  Details: kidney stones  Details: Gastric Band    Maternal Assessment  =================    Weight 104 kg  Weight (lb) 229 lb  BP syst 130 mmHg  BP diast 80 mmHg    Method  ======    Transvaginal ultrasound examination. Transabdominal ultrasound examination. View: Sufficient    Pregnancy  =========    Marx pregnancy. Number of fetuses: 1    Dating  ======    LMP on: 2019  GA by LMP 10 w + 3 d  DO by LMP: 2020  Ultrasound examination on: 2019  GA by U/S based upon: CRL  GA by U/S 8 w + 4 d  DO by U/S: 2020  Assigned: based on the LMP, selected on 2019  Assigned GA 10 w + 3 d  Assigned DO: 2020  Pregnancy length 280 d    General Evaluation  ==============    Cardiac activity absent.    Fetal Biometry  ============    Standard  CRL 19.9 mm  8w 4d        Hadlock    Extended  GS 39.4 mm  9w 2d        Rempen      Impression  =========    Missed AB.      D/w patient and messaged Dr. Cavanaugh.      Recommendation  ==============    Patient will call Dr. Cavanaugh's office for appointment to discuss options.  Patient will return to my office in the next few weeks for discussion about glucose control

## 2019-06-23 NOTE — MEDICAL/APP STUDENT
1200 B10 D&C     Name: Tonya Rivas  MRN: [id number]  : 1980 38 y.o.    Admission Dx: Missed  (10w), surgery if needed attempting Misoprostol 800 mcg q3-12 hours this weekend  POD#***    EBL: ***         Pre H/H: ***  PMH: T2DM, Obesity,   PSH: C/s x 3, gastric banding  Meds: NPH 50 (Breakfast) and 38 (bedtime); ASA81; Misoprostol  Home meds: 1/2 NPH night before surgery, aspart dose with dinner  Allergies: NKDA  Diet: Liquids including gatorade until leaving the house am of surgery  F/U:  [ ] Labs in AM  [ ] POCT Glucose  [ ] b-HCG

## 2019-06-24 ENCOUNTER — ANESTHESIA (OUTPATIENT)
Dept: SURGERY | Facility: OTHER | Age: 39
End: 2019-06-24
Payer: COMMERCIAL

## 2019-06-24 ENCOUNTER — TELEPHONE (OUTPATIENT)
Dept: OBSTETRICS AND GYNECOLOGY | Facility: CLINIC | Age: 39
End: 2019-06-24

## 2019-06-24 ENCOUNTER — ANESTHESIA EVENT (OUTPATIENT)
Dept: SURGERY | Facility: OTHER | Age: 39
End: 2019-06-24
Payer: COMMERCIAL

## 2019-06-24 ENCOUNTER — HOSPITAL ENCOUNTER (OUTPATIENT)
Facility: OTHER | Age: 39
Discharge: HOME | End: 2019-06-24
Attending: OBSTETRICS & GYNECOLOGY | Admitting: OBSTETRICS & GYNECOLOGY
Payer: COMMERCIAL

## 2019-06-24 VITALS
BODY MASS INDEX: 33.34 KG/M2 | TEMPERATURE: 99 F | WEIGHT: 220 LBS | HEIGHT: 68 IN | SYSTOLIC BLOOD PRESSURE: 122 MMHG | HEART RATE: 95 BPM | DIASTOLIC BLOOD PRESSURE: 80 MMHG | RESPIRATION RATE: 16 BRPM | OXYGEN SATURATION: 100 %

## 2019-06-24 DIAGNOSIS — Z98.890 S/P DILATION AND CURETTAGE: Primary | ICD-10-CM

## 2019-06-24 DIAGNOSIS — Z01.818 PRE-OP TESTING: ICD-10-CM

## 2019-06-24 DIAGNOSIS — O02.1 MISSED ABORTION: ICD-10-CM

## 2019-06-24 LAB
POCT GLUCOSE: 122 MG/DL (ref 70–110)
POCT GLUCOSE: 141 MG/DL (ref 70–110)

## 2019-06-24 PROCEDURE — 25000003 PHARM REV CODE 250: Performed by: STUDENT IN AN ORGANIZED HEALTH CARE EDUCATION/TRAINING PROGRAM

## 2019-06-24 PROCEDURE — 37000008 HC ANESTHESIA 1ST 15 MINUTES: Performed by: OBSTETRICS & GYNECOLOGY

## 2019-06-24 PROCEDURE — 36000704 HC OR TIME LEV I 1ST 15 MIN: Performed by: OBSTETRICS & GYNECOLOGY

## 2019-06-24 PROCEDURE — 71000016 HC POSTOP RECOV ADDL HR: Performed by: OBSTETRICS & GYNECOLOGY

## 2019-06-24 PROCEDURE — 88305 TISSUE EXAM BY PATHOLOGIST: CPT | Mod: 26,,, | Performed by: PATHOLOGY

## 2019-06-24 PROCEDURE — 36000705 HC OR TIME LEV I EA ADD 15 MIN: Performed by: OBSTETRICS & GYNECOLOGY

## 2019-06-24 PROCEDURE — 63600175 PHARM REV CODE 636 W HCPCS: Performed by: NURSE ANESTHETIST, CERTIFIED REGISTERED

## 2019-06-24 PROCEDURE — 59820 CARE OF MISCARRIAGE: CPT | Mod: ,,, | Performed by: OBSTETRICS & GYNECOLOGY

## 2019-06-24 PROCEDURE — 82962 GLUCOSE BLOOD TEST: CPT | Performed by: OBSTETRICS & GYNECOLOGY

## 2019-06-24 PROCEDURE — 88305 TISSUE EXAM BY PATHOLOGIST: CPT | Performed by: PATHOLOGY

## 2019-06-24 PROCEDURE — 71000015 HC POSTOP RECOV 1ST HR: Performed by: OBSTETRICS & GYNECOLOGY

## 2019-06-24 PROCEDURE — 71000039 HC RECOVERY, EACH ADD'L HOUR: Performed by: OBSTETRICS & GYNECOLOGY

## 2019-06-24 PROCEDURE — 25000003 PHARM REV CODE 250: Performed by: ANESTHESIOLOGY

## 2019-06-24 PROCEDURE — 37000009 HC ANESTHESIA EA ADD 15 MINS: Performed by: OBSTETRICS & GYNECOLOGY

## 2019-06-24 PROCEDURE — 27201423 OPTIME MED/SURG SUP & DEVICES STERILE SUPPLY: Performed by: OBSTETRICS & GYNECOLOGY

## 2019-06-24 PROCEDURE — 59820 PR SURG RX MISSED ABORTN,1ST TRI: ICD-10-PCS | Mod: ,,, | Performed by: OBSTETRICS & GYNECOLOGY

## 2019-06-24 PROCEDURE — 88305 TISSUE SPECIMEN TO PATHOLOGY - SURGERY: ICD-10-PCS | Mod: 26,,, | Performed by: PATHOLOGY

## 2019-06-24 PROCEDURE — 71000033 HC RECOVERY, INTIAL HOUR: Performed by: OBSTETRICS & GYNECOLOGY

## 2019-06-24 RX ORDER — SODIUM CHLORIDE 0.9 % (FLUSH) 0.9 %
3 SYRINGE (ML) INJECTION
Status: DISCONTINUED | OUTPATIENT
Start: 2019-06-24 | End: 2019-06-24 | Stop reason: HOSPADM

## 2019-06-24 RX ORDER — PROPOFOL 10 MG/ML
VIAL (ML) INTRAVENOUS
Status: DISCONTINUED | OUTPATIENT
Start: 2019-06-24 | End: 2019-06-24

## 2019-06-24 RX ORDER — IBUPROFEN 600 MG/1
600 TABLET ORAL 3 TIMES DAILY
Qty: 30 TABLET | Refills: 0 | Status: SHIPPED | OUTPATIENT
Start: 2019-06-24 | End: 2019-07-03

## 2019-06-24 RX ORDER — MEPERIDINE HYDROCHLORIDE 50 MG/ML
12.5 INJECTION INTRAMUSCULAR; INTRAVENOUS; SUBCUTANEOUS ONCE AS NEEDED
Status: DISCONTINUED | OUTPATIENT
Start: 2019-06-24 | End: 2019-06-24 | Stop reason: HOSPADM

## 2019-06-24 RX ORDER — LIDOCAINE HCL/PF 100 MG/5ML
SYRINGE (ML) INTRAVENOUS
Status: DISCONTINUED | OUTPATIENT
Start: 2019-06-24 | End: 2019-06-24

## 2019-06-24 RX ORDER — SODIUM CHLORIDE, SODIUM LACTATE, POTASSIUM CHLORIDE, CALCIUM CHLORIDE 600; 310; 30; 20 MG/100ML; MG/100ML; MG/100ML; MG/100ML
INJECTION, SOLUTION INTRAVENOUS CONTINUOUS PRN
Status: DISCONTINUED | OUTPATIENT
Start: 2019-06-24 | End: 2019-06-24

## 2019-06-24 RX ORDER — ONDANSETRON 2 MG/ML
4 INJECTION INTRAMUSCULAR; INTRAVENOUS DAILY PRN
Status: DISCONTINUED | OUTPATIENT
Start: 2019-06-24 | End: 2019-06-24 | Stop reason: HOSPADM

## 2019-06-24 RX ORDER — ONDANSETRON 2 MG/ML
INJECTION INTRAMUSCULAR; INTRAVENOUS
Status: DISCONTINUED | OUTPATIENT
Start: 2019-06-24 | End: 2019-06-24

## 2019-06-24 RX ORDER — HYDROCODONE BITARTRATE AND ACETAMINOPHEN 5; 325 MG/1; MG/1
1 TABLET ORAL EVERY 4 HOURS PRN
Qty: 5 TABLET | Refills: 0 | Status: SHIPPED | OUTPATIENT
Start: 2019-06-24 | End: 2019-07-03

## 2019-06-24 RX ORDER — DIPHENHYDRAMINE HYDROCHLORIDE 50 MG/ML
12.5 INJECTION INTRAMUSCULAR; INTRAVENOUS EVERY 30 MIN PRN
Status: DISCONTINUED | OUTPATIENT
Start: 2019-06-24 | End: 2019-06-24 | Stop reason: HOSPADM

## 2019-06-24 RX ORDER — MIDAZOLAM HYDROCHLORIDE 1 MG/ML
INJECTION INTRAMUSCULAR; INTRAVENOUS
Status: DISCONTINUED | OUTPATIENT
Start: 2019-06-24 | End: 2019-06-24

## 2019-06-24 RX ORDER — FENTANYL CITRATE 50 UG/ML
INJECTION, SOLUTION INTRAMUSCULAR; INTRAVENOUS
Status: DISCONTINUED | OUTPATIENT
Start: 2019-06-24 | End: 2019-06-24

## 2019-06-24 RX ORDER — HYDROMORPHONE HYDROCHLORIDE 2 MG/ML
0.4 INJECTION, SOLUTION INTRAMUSCULAR; INTRAVENOUS; SUBCUTANEOUS EVERY 5 MIN PRN
Status: DISCONTINUED | OUTPATIENT
Start: 2019-06-24 | End: 2019-06-24 | Stop reason: HOSPADM

## 2019-06-24 RX ORDER — OXYCODONE HYDROCHLORIDE 5 MG/1
5 TABLET ORAL
Status: DISCONTINUED | OUTPATIENT
Start: 2019-06-24 | End: 2019-06-24 | Stop reason: HOSPADM

## 2019-06-24 RX ADMIN — MIDAZOLAM HYDROCHLORIDE 2 MG: 1 INJECTION, SOLUTION INTRAMUSCULAR; INTRAVENOUS at 12:06

## 2019-06-24 RX ADMIN — DOXYCYCLINE 200 MG: 100 INJECTION, POWDER, LYOPHILIZED, FOR SOLUTION INTRAVENOUS at 01:06

## 2019-06-24 RX ADMIN — PROPOFOL 180 MG: 10 INJECTION, EMULSION INTRAVENOUS at 01:06

## 2019-06-24 RX ADMIN — SODIUM CHLORIDE, SODIUM LACTATE, POTASSIUM CHLORIDE, AND CALCIUM CHLORIDE: 600; 310; 30; 20 INJECTION, SOLUTION INTRAVENOUS at 11:06

## 2019-06-24 RX ADMIN — LIDOCAINE HYDROCHLORIDE 50 MG: 20 INJECTION, SOLUTION INTRAVENOUS at 01:06

## 2019-06-24 RX ADMIN — ONDANSETRON 4 MG: 2 INJECTION INTRAMUSCULAR; INTRAVENOUS at 01:06

## 2019-06-24 RX ADMIN — FENTANYL CITRATE 100 MCG: 50 INJECTION, SOLUTION INTRAMUSCULAR; INTRAVENOUS at 01:06

## 2019-06-24 NOTE — TELEPHONE ENCOUNTER
Called patient. She states that she took one dose of vaginal cytotec over the weekend. States that she started having bleeding and cramping, but only passing a small clot. She states she is continuing to have bleeding this morning. Will proceed with suction D&C today as scheduled. All questions answered.

## 2019-06-24 NOTE — ANESTHESIA POSTPROCEDURE EVALUATION
Anesthesia Post Evaluation    Patient: Tonya Rivas    Procedure(s) Performed: Procedure(s) (LRB):  DILATION AND CURETTAGE, UTERUS, USING SUCTION (N/A)    Final Anesthesia Type: general  Patient location during evaluation: PACU  Patient participation: Yes- Able to Participate  Level of consciousness: awake and alert  Post-procedure vital signs: reviewed and stable  Pain management: adequate  Airway patency: patent  PONV status at discharge: No PONV  Anesthetic complications: no      Cardiovascular status: blood pressure returned to baseline  Respiratory status: unassisted and room air  Hydration status: euvolemic  Follow-up not needed.          Vitals Value Taken Time   /70 6/24/2019  2:45 PM   Temp 37.2 °C (98.9 °F) 6/24/2019  2:45 PM   Pulse 85 6/24/2019  2:45 PM   Resp 16 6/24/2019  2:45 PM   SpO2 100 % 6/24/2019  1:50 PM         No case tracking events are documented in the log.      Pain/Leeroy Score: Pain Rating Prior to Med Admin: 0 (6/24/2019  1:50 PM)  Leeroy Score: 10 (6/24/2019  2:46 PM)

## 2019-06-24 NOTE — ANESTHESIA PREPROCEDURE EVALUATION
06/24/2019  Tonya Rivas is a 38 y.o., female.    Anesthesia Evaluation    I have reviewed the Patient Summary Reports.    I have reviewed the Nursing Notes.   I have reviewed the Medications.     Review of Systems  Anesthesia Hx:  Denies Family Hx of Anesthesia complications.   Denies Personal Hx of Anesthesia complications.   Social:  Non-Smoker    Hematology/Oncology:  Hematology Normal   Oncology Normal     EENT/Dental:EENT/Dental Normal   Cardiovascular:  Cardiovascular Normal     Pulmonary:  Pulmonary Normal    Renal/:   renal calculi    Hepatic/GI:  Hepatic/GI Normal    Musculoskeletal:  Musculoskeletal Normal    OB/GYN/PEDS:  Missed ab   Neurological:  Neurology Normal    Endocrine:   Diabetes, type 2, using insulin    Dermatological:  Skin Normal    Psych:  Psychiatric Normal           Physical Exam  General:  Obesity    Airway/Jaw/Neck:  Airway Findings: Mouth Opening: Normal Mallampati: I  TM Distance: Normal, at least 6 cm      Dental:  Dental Findings: In tact        Mental Status:  Mental Status Findings:  Cooperative, Alert and Oriented         Anesthesia Plan  Type of Anesthesia, risks & benefits discussed:  Anesthesia Type:  general  Patient's Preference:   Intra-op Monitoring Plan: standard ASA monitors  Intra-op Monitoring Plan Comments:   Post Op Pain Control Plan: multimodal analgesia  Post Op Pain Control Plan Comments:   Induction:   IV  Beta Blocker:         Informed Consent: Patient understands risks and agrees with Anesthesia plan.  Questions answered. Anesthesia consent signed with patient.  ASA Score: 2     Day of Surgery Review of History & Physical:    H&P update referred to the surgeon.     Anesthesia Plan Notes: Glucose 141        Ready For Surgery From Anesthesia Perspective.

## 2019-06-24 NOTE — DISCHARGE INSTRUCTIONS
Suction Curettage (Therapeutic Dilation & Curettage, D&C)  Suction curettage is a procedure to remove the lining and contents of the womb (uterus). It may be done to stop bleeding, control pain, and prevent infection after a miscarriage, , or childbirth. It may also be done to remove a molar pregnancy. This is when tumors grow in the womb instead of or in addition to a fetus.  After the procedure, you should be able to return to your normal routine in 1 or 2 days. You may have some cramping and light bleeding, however. This is normal. These problems should go away within 5 to 7 days. You can expect to have your next period within 4 to 6 weeks.    Home care  · If you have pain or cramping, use pain medicine as directed.  · If you have light bleeding, use pads instead of tampons. Change these as often as needed.  · Avoid douching, using tampons, or having sex until your healthcare provider says its OK.  · Take showers instead of baths for 1 to 2 weeks.    Follow-up care  Follow-up with your healthcare provider as directed.    When to seek medical advice  Call your healthcare provider right away if any of these occur:  · Fever of 100.4ºF (38ºC) or higher, or as directed by your provider  · Heavy bleeding  · Bleeding that lasts longer than 1 week  · Pain or cramping worsens instead of getting better  · Foul-smelling discharge from the vagina  · Passage of anything that resembles tissue from the vagina (if possible, save the tissue and bring it to the healthcare provider)  · Weakness, dizziness or fainting      Anesthesia: After Your Surgery  Youve just had surgery. During surgery, you received medication called anesthesia to keep you comfortable and pain-free. After surgery, you may experience some pain or nausea. This is common. Here are some tips for feeling better and recovering after surgery.    Going home  Your doctor or nurse will show you how to take care of yourself when you go home. He or she will  also answer your questions. Have an adult family member or friend drive you home. For the first 24 hours after your surgery:    · Do not drive or use heavy equipment.  · Do not make important decisions or sign legal documents.  · Avoid alcohol.  · Have someone stay with you, if needed. He or she can watch for problems and help keep you safe.    Be sure to keep all follow-up appointments with your doctor. And rest after your procedure for as long as your doctor tells you to.    Coping with pain  If you have pain after surgery, pain medication will help you feel better. Take it as directed, before pain becomes severe. Also, ask your doctor or pharmacist about other ways to control pain, such as with heat, ice, and relaxation. And follow any other instructions your surgeon or nurse gives you.    URINARY RETENTION  Should you experience a decrease in your urine output or are unable to urinate following surgery, this can be due to the medications given during surgery.  We recommend you going to the nearest Emergency Department.    Tips for taking pain medication  To get the best relief possible, remember these points:    · Pain medications can upset your stomach. Taking them with a little food may help.  · Most pain relievers taken by mouth need at least 20 to 30 minutes to take effect.  · Taking medication on a schedule can help you remember to take it. Try to time your medication so that you can take it before beginning an activity, such as dressing, walking, or sitting down for dinner.  · Constipation is a common side effect of pain medications. Contact your doctor before taking any medications like laxatives or stool softeners to help relieve constipation. Also ask about any dietary restrictions, because drinking lots of fluids and eating foods like fruits and vegetables that are high in fiber can also help. Remember, dont take laxatives unless your surgeon has prescribed them.  · Mixing alcohol and pain medication  can cause dizziness and slow your breathing. It can even be fatal. Dont drink alcohol while taking pain medication.  · Pain medication can slow your reflexes. Dont drive or operate machinery while taking pain medication.    If your health care provider tells you to take acetaminophen to help relieve your pain, ask him or her how much you are supposed to take each day. (Acetaminophen is the generic name for Tylenol and other brand-name pain relievers.) Acetaminophen or other pain relievers may interact with your prescription medicines or other over-the-counter (OTC) drugs. Some prescription medications contain acetaminophen along with other active ingredients. Using both prescription and OTC acetaminophen for pain can cause you to overdose. The FDA recommends that you read the labels on your OTC medications carefully. This will help you to clearly understand the list of active ingredients, dosing instructions, and any warnings. It may also help you avoid taking too much acetaminophen. If you have questions or don't understand the information, ask your pharmacist or health care provider to explain it to you before you take the OTC medication.    Managing nausea  Some people have an upset stomach after surgery. This is often due to anesthesia, pain, pain medications, or the stress of surgery. The following tips will help you manage nausea and get good nutrition as you recover. If you were on a special diet before surgery, ask your doctor if you should follow it during recovery. These tips may help:    · Dont push yourself to eat. Your body will tell you when to eat and how much.  · Start off with clear liquids and soup. They are easier to digest.  · Progress to semi-solid foods (mashed potatoes, applesauce, and gelatin) as you feel ready.  · Slowly move to solid foods. Dont eat fatty, rich, or spicy foods at first.  · Dont force yourself to have three large meals a day. Instead, eat smaller amounts more  often.  · Take pain medications with a small amount of solid food, such as crackers or toast to avoid nausea.      Call your surgeon if    · You feel too sleepy, dizzy, or groggy (medication may be too strong).  · You have side effects like nausea, vomiting, or skin changes (rash, itching, or hives).     © 4964-8878 [employer]. [address] [place] [address], [place] [address]. All rights reserved. This information is not intended as a substitute for professional medical care. Always follow your healthcare professional's instructions.    PLEASE FOLLOW ANY OTHER INSTRUCTIONS PROVIDED TO YOU BY DR. PINEDA!

## 2019-06-24 NOTE — PLAN OF CARE
Tonya Rivas has met all discharge criteria from Phase II. Vital Signs are stable, ambulating  without difficulty. Discharge instructions given, patient verbalized understanding. Discharged from facility via wheelchair in stable condition.

## 2019-06-24 NOTE — OP NOTE
OPERATIVE NOTE    DATE OF PROCEDURE: 2019    SURGEON: Tonya Cavanaugh MD    ASSISTANT: Nara Ny MD    PREOPERATIVE DIAGNOSIS: Missed     POSTOPERATIVE DIAGNOSIS: Same.   S/p suction D&C    PROCEDURE: Suction Dilation and curettage.     ANESTHESIA: General    FINDINGS:  1. Suction with moderate amount of products of conception returned    ESTIMATED BLOOD LOSS: 150 cc.      SPECIMEN: products of conception.    PROCEDURE IN DETAIL: After proper consents were explained and obtained, the   patient was taken to the Operating Room where anesthesia was obtained without difficulty. She was then placed in dorsal lithotomy position in Tello stirrups. The patient was then prepped and draped in normal sterile fashion. A weighted speculum was placed into the vagina. Right angle used to visualize the cervix, which was grasped at the anterior lip with the single tooth tenaculum.. The cervix was then serially dilated and a 8 mm curved suction curette was introduced into the endometrial cavity and suction was applied to remove the products of conception. Serial sweeps were performed and products of conception were removed. Using a Nilay curette, serial sweeps were performed until a gritty consistency of the uterine lining was felt in all 4 quadrants. A final pass was performed with the suction curette to ensure that all remaining products of conception were removed.     The tenaculum was removed and good hemostasis was noted.  The patient tolerated the procedure well. All counts were correct x2. The patient was taken to Recovery area in stable condition.      Nara Ny MD   OBGYN, PGY-1

## 2019-06-24 NOTE — H&P
"   HISTORY AND PHYSICAL                                                OBSTETRICS          Subjective:       Tonya Rivas is a 38 y.o.  female with IUP at 11w0d weeks gestation who presents with missed . Pt with dating US revealing IUP without cardiac activity. Opted for medical management, received cytotec. Pt reports some bleeding without passage of tissue and would like to proceed with surgical management at this point.      Review of Systems   Constitutional: Negative for chills and fever.   Eyes: Negative for visual disturbance.   Respiratory: Negative for shortness of breath.    Cardiovascular: Negative for chest pain and palpitations.   Gastrointestinal: Negative for abdominal pain, constipation, diarrhea, nausea and vomiting.   Genitourinary: Positive for vaginal bleeding. Negative for vaginal discharge.   Musculoskeletal: Negative for back pain.   Integumentary:  Negative for rash.   Neurological: Negative for seizures, syncope and headaches.   Hematological: Does not bruise/bleed easily.   Psychiatric/Behavioral: Negative for depression. The patient is not nervous/anxious.        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   Past Week at Unknown time    BD ULTRA-FINE MINI PEN NEEDLE 31 gauge x 3/16" Ndle Use 8 needles per day 100 each 11 2019 " "at Unknown time    blood sugar diagnostic (ONETOUCH ULTRA BLUE TEST STRIP) Strp 1 strip by Misc.(Non-Drug; Combo Route) route 4 (four) times daily. 360 strip 3 6/24/2019 at Unknown time    dulaglutide (TRULICITY) 1.5 mg/0.5 mL PnIj Inject 1.5 mg into the skin every 7 days.   6/23/2019    insulin lispro (HUMALOG KWIKPEN INSULIN) 200 unit/mL (3 mL) InPn Patient will inject 18 units with breakfast and 24 units with dinner.  These will increase with gestation. 3 mL 11 6/23/2019 at Unknown time    insulin NPH isoph U-100 human (HUMULIN N NPH INSULIN KWIKPEN) 100 unit/mL (3 mL) InPn 50 units with breakfast and 38 units at bedtime.  Doses will increase with gestation. 1 Box 11 6/23/2019 at Unknown time    insulin syringe-needle U-100 (BD LO-DOSE MICRO-FINE IV) 1/2 mL 28 gauge x 1/2" Syrg 1 each by Misc.(Non-Drug; Combo Route) route 4 (four) times daily. 100 each 1 6/23/2019 at Unknown time    insulin syringe-needle U-100 0.5 mL 31 gauge x 5/16" Syrg us as directed three times daily 100 each 0 6/23/2019 at Unknown time    miSOPROStol (CYTOTEC) 200 MCG Tab Place 4 tablets (800 mcg total) vaginally every 4 (four) hours. for 3 doses 12 tablet 0 Past Week at Unknown time    pen needle, diabetic 31 gauge x 1/4" Ndle Utilize 1 pen needle with each injection: 4 - 6 daily 100 each 11 6/23/2019 at Unknown time    prenatal 25/iron/folate 6/dha (PRENA1 ORAL) Take by mouth.   Past Week at Unknown time       SH:   Social History     Socioeconomic History    Marital status:      Spouse name: Not on file    Number of children: Not on file    Years of education: Not on file    Highest education level: Not on file   Occupational History    Occupation: RN     Comment: Dialysis   Social Needs    Financial resource strain: Not on file    Food insecurity:     Worry: Not on file     Inability: Not on file    Transportation needs:     Medical: Not on file     Non-medical: Not on file   Tobacco Use    Smoking status: Never " "Smoker    Smokeless tobacco: Never Used   Substance and Sexual Activity    Alcohol use: No     Comment: social    Drug use: No    Sexual activity: Yes     Partners: Male     Birth control/protection: None   Lifestyle    Physical activity:     Days per week: Not on file     Minutes per session: Not on file    Stress: Not on file   Relationships    Social connections:     Talks on phone: Not on file     Gets together: Not on file     Attends Anabaptist service: Not on file     Active member of club or organization: Not on file     Attends meetings of clubs or organizations: Not on file     Relationship status: Not on file   Other Topics Concern    Are you pregnant or think you may be? No    Breast-feeding No   Social History Narrative    Not on file       FH:   Family History   Problem Relation Age of Onset    Cancer Mother     Learning disabilities Son     Heart disease Maternal Grandfather     Breast cancer Neg Hx     Colon cancer Neg Hx     Ovarian cancer Neg Hx        OBHx:   OB History    Para Term  AB Living   5 3 0 3 1 2   SAB TAB Ectopic Multiple Live Births   0 1 0 0 2      # Outcome Date GA Lbr Darío/2nd Weight Sex Delivery Anes PTL Lv   5 Current            4  07 35w0d  3.005 kg (6 lb 10 oz) F CS-LTranv  Y APOLINAR      Birth Comments: diagnosed with GDM in this pregnancy, PreE      Complications: Pre-eclampsia, Gestational diabetes      Name: yuliana   3  01 26w0d  3.005 kg (6 lb 10 oz) F CS-LTranv Spinal Y APOLINAR      Birth Comments: c-s @ 26 wks due to Pre-E, "Magali"      Complications: Pre-eclampsia   2  93 25w0d  0.709 kg (1 lb 9 oz) M CS-LTranv  Y FD      Birth Comments: pt reports c-s due to Pre-E, son - "Theodore" Lissencephaly - passed at 5 years old      Complications: Pre-eclampsia      Name: ambar   1 TAB                Objective:       /75 (BP Location: Left arm, Patient Position: Sitting)   Pulse 89   Temp 98.1 °F (36.7 °C) " "(Oral)   Resp 18   Ht 5' 8" (1.727 m)   Wt 99.8 kg (220 lb)   LMP 2019   SpO2 99%   Breastfeeding? No   BMI 33.45 kg/m²     Vitals:    19 1100   BP: 121/75   BP Location: Left arm   Patient Position: Sitting   Pulse: 89   Resp: 18   Temp: 98.1 °F (36.7 °C)   TempSrc: Oral   SpO2: 99%   Weight: 99.8 kg (220 lb)   Height: 5' 8" (1.727 m)       General:   alert, appears stated age and cooperative, no apparent distress   HENT:  normocephalic, atraumatic   Eyes:  extraocular movements and conjunctivae normal   Neck:  supple, range of motion normal, no thyromegaly   Lungs:   no respiratory distress   Heart:   regular rate   Abdomen:  soft, non-tender, non-distended but gravid, no rebound or guarding    Extremities negative edema, negative erythema        Assessment:       11w0d weeks gestation with missed     Active Hospital Problems    Diagnosis  POA    Missed  [O02.1]  Yes      Resolved Hospital Problems   No resolved problems to display.          Plan:      Risks, benefits, alternatives and possible complications have been discussed in detail with the patient.   - Consents signed and to chart  - to OR for missed   - 200mg doxy prior to procedure    Nara Ny MD   OBGYN, PGY-1      "

## 2019-06-24 NOTE — TRANSFER OF CARE
"Anesthesia Transfer of Care Note    Patient: Tonya Rivas    Procedure(s) Performed: Procedure(s) (LRB):  DILATION AND CURETTAGE, UTERUS, USING SUCTION (N/A)    Patient location: PACU    Anesthesia Type: general    Transport from OR: Transported from OR on 2-3 L/min O2 by NC with adequate spontaneous ventilation    Post pain: adequate analgesia    Post assessment: no apparent anesthetic complications    Post vital signs: stable    Level of consciousness: awake, alert and oriented    Nausea/Vomiting: no nausea/vomiting    Complications: none    Transfer of care protocol was followed      Last vitals:   Visit Vitals  /75 (BP Location: Left arm, Patient Position: Sitting)   Pulse 89   Temp 36.7 °C (98.1 °F) (Oral)   Resp 18   Ht 5' 8" (1.727 m)   Wt 99.8 kg (220 lb)   LMP 04/08/2019   SpO2 99%   Breastfeeding? No   BMI 33.45 kg/m²     "

## 2019-06-24 NOTE — BRIEF OP NOTE
[facility]  Brief Operative Note     SUMMARY     Surgery Date: 2019     Surgeon(s) and Role:     * Tonya Cavanaugh MD - Primary    Assisting Surgeon: Nara Ny - Resident    Pre-op Diagnosis:  Missed ab [O02.1]    Post-op Diagnosis:  Post-Op Diagnosis Codes:     * Missed ab [O02.1]    Procedure(s) (LRB):  DILATION AND CURETTAGE, UTERUS, USING SUCTION (N/A)    Anesthesia: Choice    Findings/Key Components:   1. Suction with moderate amount of products of conception returned    Estimated Blood Loss: 150cc         Specimens:   Specimen (12h ago, onward)    Start     Ordered    19 133  Specimen to Pathology - Surgery  Once     Comments:  1-PRODUCTS OF CONCEPTION     Start Status     19 133 Collected (19) Order ID: [id number]       19          Discharge Note    SUMMARY     Admit Date: 2019    Discharge Date and Time:  2019 1:56 PM    Hospital Course: Pt presented for scheduled suction D&C secondary to missed . Procedure performed without difficulty. Please see op note for further detail. Pt transferred to the PACU in stable condition and subsequently discharged    Final Diagnosis: Post-Op Diagnosis Codes:     * Missed ab [O02.1]    Disposition: Home or Self Care    Follow Up/Patient Instructions:     Medications:  Reconciled Home Medications:      Medication List      START taking these medications    HYDROcodone-acetaminophen 5-325 mg per tablet  Commonly known as:  NORCO  Take 1 tablet by mouth every 4 (four) hours as needed for Pain.     ibuprofen 600 MG tablet  Commonly known as:  ADVIL,MOTRIN  Take 1 tablet (600 mg total) by mouth 3 (three) times daily.        CONTINUE taking these medications    aspirin 81 MG Chew  Take 81 mg by mouth once daily.     blood sugar diagnostic Strp  Commonly known as:  ONETOUCH ULTRA BLUE TEST STRIP  1 strip by Misc.(Non-Drug; Combo Route) route 4 (four) times daily.     insulin lispro 200 unit/mL  "(3 mL) Inpn  Commonly known as:  HumaLOG KwikPen Insulin  Patient will inject 18 units with breakfast and 24 units with dinner.  These will increase with gestation.     insulin NPH isoph U-100 human 100 unit/mL (3 mL) Inpn  Commonly known as:  HumuLIN N NPH Insulin KwikPen  50 units with breakfast and 38 units at bedtime.  Doses will increase with gestation.     * insulin syringe-needle U-100 1/2 mL 28 gauge x 1/2" Syrg  Commonly known as:  BD LO-DOSE MICRO-FINE IV  1 each by Misc.(Non-Drug; Combo Route) route 4 (four) times daily.     * insulin syringe-needle U-100 0.5 mL 31 gauge x 5/16" Syrg  us as directed three times daily     miSOPROStol 200 MCG Tab  Commonly known as:  CYTOTEC  Place 4 tablets (800 mcg total) vaginally every 4 (four) hours. for 3 doses     * pen needle, diabetic 31 gauge x 1/4" Ndle  Utilize 1 pen needle with each injection: 4 - 6 daily     * BD ULTRA-FINE MINI PEN NEEDLE 31 gauge x 3/16" Ndle  Generic drug:  pen needle, diabetic  Use 8 needles per day     PRENA1 ORAL  Take by mouth.     TRULICITY 1.5 mg/0.5 mL Pnij  Generic drug:  dulaglutide  Inject 1.5 mg into the skin every 7 days.         * This list has 4 medication(s) that are the same as other medications prescribed for you. Read the directions carefully, and ask your doctor or other care provider to review them with you.              Discharge Procedure Orders   Notify your health care provider if you experience any of the following:   Order Comments: Soaking through 1 pad/hour for greater than 2 hours     Notify your health care provider if you experience any of the following:  increased confusion or weakness     Notify your health care provider if you experience any of the following:  persistent dizziness, light-headedness, or visual disturbances     Notify your health care provider if you experience any of the following:  severe persistent headache     Notify your health care provider if you experience any of the following:  " difficulty breathing or increased cough     Notify your health care provider if you experience any of the following:  severe uncontrolled pain     Notify your health care provider if you experience any of the following:  temperature >100.4     Notify your health care provider if you experience any of the following:  persistent nausea and vomiting or diarrhea     Activity as tolerated     Follow-up Information     Tonya Cavanaugh MD.    Specialty:  Obstetrics and Gynecology  Contact information:  [address] [facility] [id number] [phone]

## 2019-06-26 RX ORDER — METFORMIN HYDROCHLORIDE 1000 MG/1
1000 TABLET ORAL 2 TIMES DAILY WITH MEALS
Qty: 180 TABLET | Refills: 0 | Status: SHIPPED | OUTPATIENT
Start: 2019-06-26 | End: 2019-07-03 | Stop reason: SDUPTHER

## 2019-06-27 LAB
LEFT EYE DM RETINOPATHY: POSITIVE
RIGHT EYE DM RETINOPATHY: NEGATIVE

## 2019-06-28 ENCOUNTER — PATIENT MESSAGE (OUTPATIENT)
Dept: ADMINISTRATIVE | Facility: OTHER | Age: 39
End: 2019-06-28

## 2019-06-28 ENCOUNTER — PATIENT MESSAGE (OUTPATIENT)
Dept: OBSTETRICS AND GYNECOLOGY | Facility: CLINIC | Age: 39
End: 2019-06-28

## 2019-07-01 ENCOUNTER — TELEPHONE (OUTPATIENT)
Dept: OBSTETRICS AND GYNECOLOGY | Facility: CLINIC | Age: 39
End: 2019-07-01

## 2019-07-01 NOTE — TELEPHONE ENCOUNTER
----- Message from Dot Ortega sent at 7/1/2019  3:05 PM CDT -----  Contact: pt  Name of Who is Calling: Janeth      What is the request in detail: requesting a call back in reference to her having a d&C on the 6/24 and does not have a f/u visit scheduled. Does this visit need to be scheduled with the doctor or NP. I offered to assist the pt in scheduling the f/u but she states that she would like to speak with Anyi Osuna first. Please call to advise      Can the clinic reply by [facility]: yes      [facility] [phone]

## 2019-07-02 PROBLEM — G47.33 OBSTRUCTIVE SLEEP APNEA: Status: ACTIVE | Noted: 2019-07-02

## 2019-07-02 NOTE — PROGRESS NOTES
"This note was created by combination of typed  and Dragon dictation.  Transcription errors may be present.  If there are any questions, please contact me.    Assessment / Plan:   Controlled type 2 diabetes mellitus without complication, with long-term current use of insulin  -long discussion with the patient.  She is prioritizing glucose control at this time.  Not planning pregnancy in the immediate future but is thinking about it perhaps in the next year.  She has an upcoming visit with endocrinology.  She is on max dose of Trulicity and max dose of metformin though recently restarted.  She is wary of Jardiance or similar because of perceived kidney risk.  Limiting weight gain is a priority.  After discussion she would like to resume NPH for glucose control and stay on metformin and Trulicity.  Now that she has unfortunately no longer pregnant, her insulin requirements are probably much much lower.  Restart NPH at night for elevated morning sugar, start with 5 units at night and titrate up.  Hold on starting morning dose.  Refilled pharmacy.  She has had a recent eye exam and I will try to get those results.  -     insulin NPH isoph U-100 human (HUMULIN N NPH INSULIN KWIKPEN) 100 unit/mL (3 mL) InPn; 50 units in evening, increase by 2 units every 3 days goal AM glc 100-130  Dispense: 1 Box; Refill: 11  -     metFORMIN (GLUCOPHAGE) 1000 MG tablet; Take 1 tablet (1,000 mg total) by mouth 2 (two) times daily with meals.  Dispense: 180 tablet; Refill: 0  -     dulaglutide (TRULICITY) 1.5 mg/0.5 mL PnIj; Inject 1.5 mg into the skin every 7 days.  Dispense: 1 Syringe; Refill: 11  -     BD ULTRA-FINE MINI PEN NEEDLE 31 gauge x 3/16" Ndle; Use 8 needles per day  Dispense: 100 each; Refill: 11  -     blood sugar diagnostic (ONETOUCH ULTRA BLUE TEST STRIP) Strp; 1 strip by Misc.(Non-Drug; Combo Route) route 4 (four) times daily.  Dispense: 360 strip; Refill: 3  -     Comprehensive metabolic panel; Future; Expected " "date: 2019  -     Hemoglobin A1c; Future; Expected date: 2019    Obstructive sleep apnea    Plantar fasciitis of right foot  -recommended heel cushion and arch supports.  Stretching exercises.  If persisting and worsening she can contact me for referral to Podiatry.    Medications Discontinued During This Encounter   Medication Reason    HYDROcodone-acetaminophen (NORCO) 5-325 mg per tablet Patient no longer taking    ibuprofen (ADVIL,MOTRIN) 600 MG tablet Patient no longer taking    miSOPROStol (CYTOTEC) 200 MCG Tab Patient no longer taking    insulin lispro (HUMALOG KWIKPEN INSULIN) 200 unit/mL (3 mL) InPn Therapy completed    insulin NPH isoph U-100 human (HUMULIN N NPH INSULIN KWIKPEN) 100 unit/mL (3 mL) InPn     metFORMIN (GLUCOPHAGE) 1000 MG tablet Reorder    dulaglutide (TRULICITY) 1.5 mg/0.5 mL PnIj Reorder    BD ULTRA-FINE MINI PEN NEEDLE 31 gauge x 3/16" Ndle Reorder    blood sugar diagnostic (ONETOUCH ULTRA BLUE TEST STRIP) Strp Reorder       meds sent this encounter:  Medications Ordered This Encounter   Medications    BD ULTRA-FINE MINI PEN NEEDLE 31 gauge x 3/16" Ndle     Sig: Use 8 needles per day     Dispense:  100 each     Refill:  11    blood sugar diagnostic (ONETOUCH ULTRA BLUE TEST STRIP) Strp     Si strip by Misc.(Non-Drug; Combo Route) route 4 (four) times daily.     Dispense:  360 strip     Refill:  3    dulaglutide (TRULICITY) 1.5 mg/0.5 mL PnIj     Sig: Inject 1.5 mg into the skin every 7 days.     Dispense:  1 Syringe     Refill:  11    insulin NPH isoph U-100 human (HUMULIN N NPH INSULIN KWIKPEN) 100 unit/mL (3 mL) InPn     Si units in evening, increase by 2 units every 3 days goal AM glc 100-130     Dispense:  1 Box     Refill:  11    metFORMIN (GLUCOPHAGE) 1000 MG tablet     Sig: Take 1 tablet (1,000 mg total) by mouth 2 (two) times daily with meals.     Dispense:  180 tablet     Refill:  0       Follow Up: No follow-ups on file.      Subjective: "     Chief Complaint   Patient presents with    Diabetes     fasting 150-170. fast acting insulin drops her sugar in the 60s    Heel Pain     right side       HPI  Tonya is a 38 y.o. female, last appointment with this clinic was Visit date not found.    Patient's last menstrual period was 2019.    I previously saw her in late October.  Diabetes, did not qualify for digital diabetes monitoring program.  She subsequently got pregnant.  Unfortunately had missed  and underwent D and C   She had stopped Trulicity on discovering she was pregnant.  She is on NPH and Humalog.  And metformin.    2019 a1c 9.5    After D+C restarted trulicity and metformin.  Had been on NPH and aspart - was taking 50/38 NPH and aspart 18/24 prior to her D&C.    After the D&C, she continued her NPH at prescribed doses with episode of hypoglycemia.  She did not take her NovoLog.  So she stopped insulin.  And she resumed metformin and Trulicity at maximum dose.  Initial GI upset but that is getting better.    She notes now her fasting sugars are 160-170 range.  No episodes of hypoglycemia.  She notes postprandial readings have been in the 140-150 range.  A reading in the evening in the 170 range but typically it is lower than that.    Eye exam 2 weeks ago. Spot on eyes. Blurred vision. [facility]    Answers for HPI/ROS submitted by the patient on 2019   Diabetes problem  Diabetes type: type 2  MedicAlert ID: [id number]  Disease duration: 12 years  fatigue: No  foot paresthesias: No  foot ulcerations: No  polyphagia: Yes  polyuria: No  visual change: No  Symptom course: stable  confusion: No  hunger: No  mood changes: No  pallor: No  sleepiness: No  speech difficulty: No  sweats: No  blackouts: No  hospitalization: No  nocturnal hypoglycemia: No  required assistance: No  required glucagon: No  CVA: No  heart disease: No  impotence: No  nephropathy: No  peripheral neuropathy: No  PVD: No  retinopathy:  "No  autonomic neuropathy: No  CAD risks: diabetes mellitus  Current treatments: diet, insulin injections, oral agent (triple therapy)  Treatment compliance: all of the time  Dose schedule: pre-breakfast, pre-dinner  Given by: patient  Injection sites: abdominal wall  Home blood tests: 3-4 x per day  Home urines: <1 x per month  Monitoring compliance: good  Blood glucose trend: increasing steadily  Weight trend: increasing rapidly  Current diet: diabetic  Meal planning: avoidance of concentrated sweets, carbohydrate counting  Exercise: three times a week  Dietitian visit: No  Eye exam current: Yes  Sees podiatrist: No      Patient Care Team:  Hernando Duncan MD as PCP - General (Internal Medicine)  Amee Stiles MD as Consulting Physician (Endocrinology)  Neri Peña as Physician (Optometry)    Patient Active Problem List    Diagnosis Date Noted    Obstructive sleep apnea 2019    Missed  2019    S/P suction dilation and curettage 2019    Supervision of high risk pregnancy in first trimester 2019    History of  delivery, currently pregnant 2019    History of pre-eclampsia in prior pregnancy, currently pregnant 2019    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    Elderly multigravida 2019    History of herpes simplex 2019    Type 2 diabetes mellitus affecting pregnancy in first trimester, antepartum 2019    Uncontrolled type 2 diabetes mellitus with hyperglycemia started on insulin 2019 10/05/2012       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     Diabetes mellitus type II     uncontrolled    Herpes simplex virus (HSV) infection     "cold sores"    History of  delivery, currently pregnant x2     Kidney stones     Obesity 2013       PAST SURGICAL HISTORY:  Past Surgical History:   Procedure Laterality Date     SECTION   " "   x3    DILATION AND CURETTAGE, UTERUS, USING SUCTION N/A 6/24/2019    [facility]formed [facility]    EXTRACORPOREAL SHOCK WAVE LITHOTRIPSY      LAPAROSCOPIC GASTRIC BANDING  2005       SOCIAL HISTORY:  Social History     Socioeconomic History    Marital status:      Spouse name: Not on file    Number of children: Not on file    Years of education: Not on file    Highest education level: Not on file   Occupational History    Occupation: RN     Comment: Dialysis   Social Needs    Financial resource strain: Not hard at all    Food insecurity:     Worry: Not on file     Inability: Not on file    Transportation needs:     Medical: Not on file     Non-medical: Not on file   Tobacco Use    Smoking status: Never Smoker    Smokeless tobacco: Never Used   Substance and Sexual Activity    Alcohol use: No     Frequency: Monthly or less     Drinks per session: 1 or 2     Binge frequency: Never     Comment: social    Drug use: No    Sexual activity: Yes     Partners: Male     Birth control/protection: None   Lifestyle    Physical activity:     Days per week: 3 days     Minutes per session: 30 min    Stress: Rather much   Relationships    Social connections:     Talks on phone: Twice a week     Gets together: Twice a week     Attends [employer] service: Not on file     Active member of club or organization: No     Attends meetings of clubs or organizations: Patient refused     Relationship status:    Other Topics Concern    Are you pregnant or think you may be? No    Breast-feeding No   Social History Narrative    Not on file        ALLERGIES AND MEDICATIONS: updated and reviewed.  Review of patient's allergies indicates:  No Known Allergies  Current Outpatient Medications   Medication Sig Dispense Refill    aspirin 81 MG Chew Take 81 mg by mouth once daily.      BD ULTRA-FINE MINI PEN NEEDLE 31 gauge x 3/16" Ndle Use 8 needles per day 100 each 11    blood sugar diagnostic " "(ONETOUCH ULTRA BLUE TEST STRIP) Strp 1 strip by Misc.(Non-Drug; Combo Route) route 4 (four) times daily. 360 strip 3    dulaglutide (TRULICITY) 1.5 mg/0.5 mL PnIj Inject 1.5 mg into the skin every 7 days.      insulin lispro (HUMALOG KWIKPEN INSULIN) 200 unit/mL (3 mL) InPn Patient will inject 18 units with breakfast and 24 units with dinner.  These will increase with gestation. 3 mL 11    insulin NPH isoph U-100 human (HUMULIN N NPH INSULIN KWIKPEN) 100 unit/mL (3 mL) InPn 50 units with breakfast and 38 units at bedtime.  Doses will increase with gestation. 1 Box 11    insulin syringe-needle U-100 (BD LO-DOSE MICRO-FINE IV) 1/2 mL 28 gauge x 1/2" Syrg 1 each by Misc.(Non-Drug; Combo Route) route 4 (four) times daily. 100 each 1    insulin syringe-needle U-100 0.5 mL 31 gauge x 5/16" Syrg us as directed three times daily 100 each 0    metFORMIN (GLUCOPHAGE) 1000 MG tablet TAKE 1 TABLET (1,000 MG TOTAL) BY MOUTH 2 (TWO) TIMES DAILY WITH MEALS. 180 tablet 0    pen needle, diabetic 31 gauge x 1/4" Ndle Utilize 1 pen needle with each injection: 4 - 6 daily 100 each 11    prenatal 25/iron/folate 6/dha (PRENA1 ORAL) Take by mouth.       No current facility-administered medications for this visit.        Review of Systems   Constitutional: Negative for weight loss.   Eyes: Negative for blurred vision.   Cardiovascular: Positive for chest pain.   Neurological: Negative for dizziness, tremors, seizures, weakness and headaches.   Endo/Heme/Allergies: Negative for polydipsia.   Psychiatric/Behavioral: The patient is not nervous/anxious.        Objective:   Physical Exam   Vitals:    07/03/19 1603   BP: 110/78   BP Location: Right arm   Patient Position: Sitting   BP Method: Medium (Manual)   Pulse: 102   Temp: 98.5 °F (36.9 °C)   TempSrc: Oral   SpO2: 98%   Weight: 99.8 kg (220 lb)   Height: 5' 8" (1.727 m)    Body mass index is 33.45 kg/m².  Weight: 99.8 kg (220 lb)   Height: 5' 8" (172.7 cm)     Physical Exam "   Constitutional: She is oriented to person, place, and time. She appears well-developed and well-nourished. No distress.   HENT:   Head: Normocephalic and atraumatic.   Eyes: No scleral icterus.   Pulmonary/Chest: Effort normal.   Neurological: She is alert and oriented to person, place, and time.   Skin: Skin is warm and dry.   Psychiatric: She has a normal mood and affect. Her behavior is normal. Thought content normal.

## 2019-07-03 ENCOUNTER — OFFICE VISIT (OUTPATIENT)
Dept: FAMILY MEDICINE | Facility: CLINIC | Age: 39
End: 2019-07-03
Payer: COMMERCIAL

## 2019-07-03 ENCOUNTER — TELEPHONE (OUTPATIENT)
Dept: OBSTETRICS AND GYNECOLOGY | Facility: CLINIC | Age: 39
End: 2019-07-03

## 2019-07-03 VITALS
OXYGEN SATURATION: 98 % | SYSTOLIC BLOOD PRESSURE: 110 MMHG | HEART RATE: 102 BPM | WEIGHT: 220 LBS | TEMPERATURE: 99 F | DIASTOLIC BLOOD PRESSURE: 78 MMHG | BODY MASS INDEX: 33.34 KG/M2 | HEIGHT: 68 IN

## 2019-07-03 DIAGNOSIS — Z79.4 CONTROLLED TYPE 2 DIABETES MELLITUS WITHOUT COMPLICATION, WITH LONG-TERM CURRENT USE OF INSULIN: Primary | ICD-10-CM

## 2019-07-03 DIAGNOSIS — E11.9 CONTROLLED TYPE 2 DIABETES MELLITUS WITHOUT COMPLICATION, WITH LONG-TERM CURRENT USE OF INSULIN: Primary | ICD-10-CM

## 2019-07-03 DIAGNOSIS — E11.65 UNCONTROLLED TYPE 2 DIABETES MELLITUS WITH HYPERGLYCEMIA: ICD-10-CM

## 2019-07-03 DIAGNOSIS — G47.33 OBSTRUCTIVE SLEEP APNEA: ICD-10-CM

## 2019-07-03 DIAGNOSIS — M72.2 PLANTAR FASCIITIS OF RIGHT FOOT: ICD-10-CM

## 2019-07-03 PROBLEM — O09.899 HISTORY OF PRETERM DELIVERY, CURRENTLY PREGNANT: Status: RESOLVED | Noted: 2019-05-16 | Resolved: 2019-07-03

## 2019-07-03 PROBLEM — O09.299 HISTORY OF PRE-ECLAMPSIA IN PRIOR PREGNANCY, CURRENTLY PREGNANT: Status: RESOLVED | Noted: 2019-05-16 | Resolved: 2019-07-03

## 2019-07-03 PROBLEM — O09.91 SUPERVISION OF HIGH RISK PREGNANCY IN FIRST TRIMESTER: Status: RESOLVED | Noted: 2019-06-20 | Resolved: 2019-07-03

## 2019-07-03 PROBLEM — O09.529 ELDERLY MULTIGRAVIDA: Status: RESOLVED | Noted: 2019-05-16 | Resolved: 2019-07-03

## 2019-07-03 PROCEDURE — 99999 PR PBB SHADOW E&M-EST. PATIENT-LVL III: ICD-10-PCS | Mod: PBBFAC,,, | Performed by: INTERNAL MEDICINE

## 2019-07-03 PROCEDURE — 3046F PR MOST RECENT HEMOGLOBIN A1C LEVEL > 9.0%: ICD-10-PCS | Mod: CPTII,S$GLB,, | Performed by: INTERNAL MEDICINE

## 2019-07-03 PROCEDURE — 99214 OFFICE O/P EST MOD 30 MIN: CPT | Mod: S$GLB,,, | Performed by: INTERNAL MEDICINE

## 2019-07-03 PROCEDURE — 99214 PR OFFICE/OUTPT VISIT, EST, LEVL IV, 30-39 MIN: ICD-10-PCS | Mod: S$GLB,,, | Performed by: INTERNAL MEDICINE

## 2019-07-03 PROCEDURE — 3008F BODY MASS INDEX DOCD: CPT | Mod: CPTII,S$GLB,, | Performed by: INTERNAL MEDICINE

## 2019-07-03 PROCEDURE — 3046F HEMOGLOBIN A1C LEVEL >9.0%: CPT | Mod: CPTII,S$GLB,, | Performed by: INTERNAL MEDICINE

## 2019-07-03 PROCEDURE — 3008F PR BODY MASS INDEX (BMI) DOCUMENTED: ICD-10-PCS | Mod: CPTII,S$GLB,, | Performed by: INTERNAL MEDICINE

## 2019-07-03 PROCEDURE — 99999 PR PBB SHADOW E&M-EST. PATIENT-LVL III: CPT | Mod: PBBFAC,,, | Performed by: INTERNAL MEDICINE

## 2019-07-03 RX ORDER — PEN NEEDLE, DIABETIC 31 GX5/16"
NEEDLE, DISPOSABLE MISCELLANEOUS
Qty: 100 EACH | Refills: 11 | Status: SHIPPED | OUTPATIENT
Start: 2019-07-03 | End: 2020-05-20

## 2019-07-03 RX ORDER — METFORMIN HYDROCHLORIDE 1000 MG/1
1000 TABLET ORAL 2 TIMES DAILY WITH MEALS
Qty: 180 TABLET | Refills: 0 | Status: SHIPPED | OUTPATIENT
Start: 2019-07-03 | End: 2019-10-07 | Stop reason: SDUPTHER

## 2019-07-03 NOTE — TELEPHONE ENCOUNTER
----- Message from Tonya Cavanaugh MD sent at 7/3/2019 10:30 AM CDT -----  Can you call this patient and see how she is doing after surgery? Can schedule her a 4 week postop appt (had D&C for miscarriage)?

## 2019-07-03 NOTE — TELEPHONE ENCOUNTER
Left  for pt to give the office a call.    Can you call this patient and see how she is doing after surgery? Can schedule her a 4 week postop appt (had D&C for miscarriage)?

## 2019-08-02 ENCOUNTER — PATIENT MESSAGE (OUTPATIENT)
Dept: DERMATOLOGY | Facility: CLINIC | Age: 39
End: 2019-08-02

## 2019-08-02 DIAGNOSIS — L40.0 PSORIASIS VULGARIS: Primary | ICD-10-CM

## 2019-08-05 RX ORDER — BETAMETHASONE DIPROPIONATE 0.5 MG/G
CREAM TOPICAL
Qty: 50 G | Refills: 1 | Status: SHIPPED | OUTPATIENT
Start: 2019-08-05 | End: 2021-01-16 | Stop reason: SDUPTHER

## 2019-08-29 ENCOUNTER — PATIENT OUTREACH (OUTPATIENT)
Dept: ADMINISTRATIVE | Facility: OTHER | Age: 39
End: 2019-08-29

## 2019-09-03 ENCOUNTER — OFFICE VISIT (OUTPATIENT)
Dept: PODIATRY | Facility: CLINIC | Age: 39
End: 2019-09-03
Payer: COMMERCIAL

## 2019-09-03 VITALS — HEIGHT: 68 IN | WEIGHT: 220 LBS | BODY MASS INDEX: 33.34 KG/M2

## 2019-09-03 DIAGNOSIS — M20.42 HAMMER TOES OF BOTH FEET: ICD-10-CM

## 2019-09-03 DIAGNOSIS — M72.2 PLANTAR FASCIITIS: ICD-10-CM

## 2019-09-03 DIAGNOSIS — M20.41 HAMMER TOES OF BOTH FEET: ICD-10-CM

## 2019-09-03 DIAGNOSIS — M20.5X2 HALLUX LIMITUS, ACQUIRED, LEFT: ICD-10-CM

## 2019-09-03 DIAGNOSIS — M20.5X1 HALLUX LIMITUS, ACQUIRED, RIGHT: ICD-10-CM

## 2019-09-03 DIAGNOSIS — M79.671 FOOT PAIN, BILATERAL: ICD-10-CM

## 2019-09-03 DIAGNOSIS — M79.672 FOOT PAIN, BILATERAL: ICD-10-CM

## 2019-09-03 DIAGNOSIS — E11.9 COMPREHENSIVE DIABETIC FOOT EXAMINATION, TYPE 2 DM, ENCOUNTER FOR: Primary | ICD-10-CM

## 2019-09-03 PROCEDURE — 3046F HEMOGLOBIN A1C LEVEL >9.0%: CPT | Mod: CPTII,S$GLB,, | Performed by: PODIATRIST

## 2019-09-03 PROCEDURE — 3046F PR MOST RECENT HEMOGLOBIN A1C LEVEL > 9.0%: ICD-10-PCS | Mod: CPTII,S$GLB,, | Performed by: PODIATRIST

## 2019-09-03 PROCEDURE — 3008F BODY MASS INDEX DOCD: CPT | Mod: CPTII,S$GLB,, | Performed by: PODIATRIST

## 2019-09-03 PROCEDURE — 99203 OFFICE O/P NEW LOW 30 MIN: CPT | Mod: S$GLB,,, | Performed by: PODIATRIST

## 2019-09-03 PROCEDURE — 99203 PR OFFICE/OUTPT VISIT, NEW, LEVL III, 30-44 MIN: ICD-10-PCS | Mod: S$GLB,,, | Performed by: PODIATRIST

## 2019-09-03 PROCEDURE — 99999 PR PBB SHADOW E&M-EST. PATIENT-LVL III: CPT | Mod: PBBFAC,,, | Performed by: PODIATRIST

## 2019-09-03 PROCEDURE — 3008F PR BODY MASS INDEX (BMI) DOCUMENTED: ICD-10-PCS | Mod: CPTII,S$GLB,, | Performed by: PODIATRIST

## 2019-09-03 PROCEDURE — 99999 PR PBB SHADOW E&M-EST. PATIENT-LVL III: ICD-10-PCS | Mod: PBBFAC,,, | Performed by: PODIATRIST

## 2019-09-03 RX ORDER — MELOXICAM 15 MG/1
15 TABLET ORAL DAILY
Qty: 30 TABLET | Refills: 1 | Status: SHIPPED | OUTPATIENT
Start: 2019-09-03 | End: 2019-09-17 | Stop reason: SDUPTHER

## 2019-09-03 NOTE — PATIENT INSTRUCTIONS
Over the counter pain cream: Biofreeze, Bengay, tiger balm, two old goat, lidocaine gel,  Absorbine Veterinary Liniment Gel Topical Analgesic Sore Muscle and Joint Pain Relief    Recommend lotions: eucerin, eucerin for diabetics, aquaphor, A&D ointment, gold bond for diabetics, sween, [place]'s Bees all purpose baby ointment,  urea 40 with aloe (found on amazon.com)    Shoe recommendations: (try 6pm.com, zappos[employer] , [employer]ordstrom[employer], or shoes.[employer] for discounted prices) you can visit [employer]SW shoes in [employer]  or [employer] in the [facility] (there are also several shoe brand outlets in the [facility])    Asics (GT 2000 or gel foundations), new balance stability type shoes, saucony (stabil c3),  Euceda (GTS or Beast or transcend), propet (tennis shoe)    Sofft Brand (women) Arias&Carlos (men), clarks, crocs, aerosoles, naturalizers, SAS, ecco, born, rolly sepulveda, rockports (dress shoes)    Vionic, burkenstocks, fitflops, propet (sandals)  Nike comfort thong sandals, crocs, propet (house shoes)    Nail Home remedy:  Vicks Vapor rub to nails for easier manageability    Occasional soaks for 15-20 mins in luke warm water with 1/2 cup of listerine and 1 cup of apple cider vinegar are ok You may add several drops of oil of oregano or tea tree oil as well    Understanding Plantar Fasciitis    Plantar fasciitis is a condition that causes foot and heel pain. The plantar fascia is a tough band of tissue that runs across the bottom of the foot from the heel to the toes. This tissue pulls on the heel bone. It supports the arch of the foot as it pushes off the ground. If the tissue becomes irritated or red and swollen (inflamed), it is called plantar fasciitis.  How to say it  PLAN-tuhr fa-see-IY-tis   What causes plantar fasciitis?  Plantar fasciitis most often occurs from overusing the plantar fascia. The tissue may become damaged from activities that put repeated stress on the heel and foot. Or it may wear down over time with  age and ankle stiffness. You are more likely to have plantar fasciitis if you:  · Do activities that require a lot of running, jumping, or dancing  · Have a job that requires being on your feet for long periods  · Are overweight or obese  · Have certain foot problems, such as a tight Achilles tendon, flat feet, or high arches  · Often wear poorly fitting shoes  Symptoms of plantar fasciitis  The condition most often causes pain in the heel and the bottom of the foot. The pain may occur when you take your first steps in the morning. It may get better as you walk throughout the day. But as you continue to put weight on the foot, the pain often returns. Pain may also occur after standing or sitting for long periods.  Treating plantar fasciitis  Treatments for plantar fasciitis include:  · Resting the foot. This involves limiting movements that make your foot hurt. You may also need to avoid certain sports and types of work for a time.  · Using cold packs. Put an ice pack on the heel and foot to help reduce pain and swelling.  · Taking pain medicines. Prescription and over-the-counter pain medicines can help relieve pain and swelling.  · Using heel cups or foot inserts (orthotics). These are placed in the shoes to help support the heel or arch and cushion the heel. You may also be told to buy proper-fitting shoes with good arch support and cushioned soles.  · Taping the foot. This supports the arch and limits the movement of the plantar fascia to help relieve symptoms.  · Wearing a night splint. This stretches the plantar fascia and leg muscles while you sleep. This may help relieve pain.  · Doing exercises and physical therapy. These stretch and strengthen the plantar fascia and the muscles in the leg that support the heel and foot.  · Getting shots of medicine into the foot. These may help relieve symptoms for a time.  · Having surgery. This may be needed if other treatments fail to relieve symptoms. During surgery,  the surgeon may partially cut the plantar fascia to release tension.  Possible complications of plantar fasciitis  Without proper care and treatment, healing may take longer than normal. Also, symptoms may continue or get worse. Over time, the plantar fascia may be damaged. This can make it hard to walk or even stand without pain.  When to call your healthcare provider  Call your healthcare provider right away if you have any of these:  · Fever of 100.4°F (38°C) or higher, or as directed  · Symptoms that dont get better with treatment, or get worse  · New symptoms, such as numbness, tingling, or weakness in the foot   © 3643-7484 [employer]. [address] [place] [address], [place] [address]. All rights reserved. This information is not intended as a substitute for professional medical care. Always follow your healthcare professional's instructions.        Treating Plantar Fasciitis  First, your healthcare provider tries to determine the cause of your problem in order to suggest ways to relieve pain. If your pain is due to poor foot mechanics, custom-made shoe inserts (orthoses) may help.    Reduce symptoms  · To relieve mild symptoms, try aspirin, ibuprofen, or other medicines as directed. Rubbing ice on the affected area may also help.  · To reduce severe pain and swelling, your healthcare provider may prescribe pills or injections or a walking cast in some instances. Physical therapy, such as ultrasound or a daily stretching program, may also be recommended. Surgery is rarely required.  · To reduce symptoms caused by poor foot mechanics, your foot may be taped. This supports the arch and temporarily controls movement. Night splints may also help by stretching the fascia.  Control movement  If taping helps, your healthcare provider may prescribe orthoses. Built from plaster casts of your feet, these inserts control the way your foot moves. As a result, your symptoms should go away.  Reduce overuse  Every  time your foot strikes the ground, the plantar fascia is stretched. You can reduce the strain on the plantar fascia and the possibility of overuse by following these suggestions:  · Lose any excess weight.  · Avoid running on hard or uneven ground.  · Use orthoses at all times in your shoes and house slippers.  If surgery is needed  Your healthcare provider may consider surgery if other types of treatment don't control your pain. During surgery, the plantar fascia is partially cut to release tension. As you heal, fibrous tissue fills the space between the heel bone and the plantar fascia.   © 1495-4821 [employer]. [address] [place] [address]. All rights reserved. This information is not intended as a substitute for professional medical care. Always follow your healthcare professional's instructions.        Wearing Proper Shoes                    You walk on your feet every day, forcing them to support the weight of your body. Repeated stress on your feet can cause damage over time. The right shoes can help protect your feet. The wrong shoes can cause more foot problems. Read the information below to help you find a shoe that fits your foot needs.     A good shoe fit will cover your foot outline.       A shoe that doesnt cover the outline is a bad fit.      Whats Your Foot Shape?  To get a good fit, you need to know the shape of your foot. Do this simple test: While standing, place your foot on a piece of paper and trace around it. Is your foot straight or curved? Do you have a foot problem, such as a bunion, that causes your foot outline to show a bulge on the side of your big toe?  Finding Your Fit  Bring your foot outline to the [employer] store to help you find the right shoe. Place a shoe you like on top of the outline to see if it matches the shape. The shoe should cover the outline. (If you have a bunion, the shoe may not cover the bulge on the outline. Look for soft leather shoes to  stretch over the bunion.) Once youve found a pair of proper shoes, put them on. Walk around. Be sure the shoes dont rub or pinch. If the shoes feel good, youve found your fit!  The Right Shoe for You  A good shoe has features that provide comfort and support. It must also be the right size and shape for your feet. Look for a shoe made of breathable fabric and lining, such as leather or canvas. Be sure that shoes have enough tread to prevent slipping. Go to a good shoe store for help finding the right shoe.  Good Shoe Features  An ideal shoe has the following:  · Laces for support. If tying laces is a problem for you, try shoes with Velcro fasteners or halley.  · A front of the shoe (toe box) with ½ inch space in front of your longest toes.  · An arch shape that supports your foot.  · No more than 1½ inches of heel.  · A stiff, snug back of the shoe to keep your foot from sliding around.  · A smooth lining with no rough seams.  Shoe Shopping Tips  Below are some dos and donts for when you go to the shoe store.  Do:  · Select the shoes that feel right. Wear them around the house. Then bring them to your foot doctor to check for fit. If they dont fit well, return them.  · Shop late in the day, when your feet will be slightly bigger.  · Each time you buy shoes, have both your feet measured while you are standing. Foot size changes with time.  · Pick shoes to suit their purpose. High heels are okay for an occasional night on the town. But for everyday wear, choose a more sensible shoe.  · Try on shoes while wearing any inserts specially made for your feet (orthoses).  · Try on both the right and left shoes. If your feet are different sizes, pick a pair that fits the larger foot.  Dont:  · Dont buy shoes based on shoe size alone. Always try on shoes, as sizes differ from brand to brand and within brands.  · Dont expect shoes to break in. If they dont fit at the store, dont buy them.  · Dont buy a shoe that  doesnt match your foot shape.  What About Socks?  Always wear socks with shoes. Socks help absorb sweat and reduce friction and blistering. When shopping for shoes, choose soft, padded socks with seams that dont irritate your feet.  If You Have Foot Problems  Some foot problems cause deformities. This can make it hard to find a good fit. Look for shoes made of soft leather to stretch over the deformity. If you have bunions, buy shoes with a wider toe box. To fit hammertoes, look for shoes with a tall toe box. If you have arch problems, you may need inserts. In some cases, youll need to have custom footwear or orthoses made for your feet.  Suggested Footwear  Ask your healthcare provider what kind of footwear you need. He or she may recommend a certain brand or shoe store.  © 0809-2981 [employer]. [address] [place] [address]. All rights reserved. This information is not intended as a substitute for professional medical care. Always follow your healthcare professional's instructions.        Toe Extension (Flexibility)    These instructions are for your right foot. Switch sides for your left foot.  1. Sit in a chair. Rest your right ankle on your left knee.  2. Hold your toes with your right hand. Gently bend the toes backward. Feel a stretch in the undersides of the toes and ball of the foot. Hold for 30 to 60 seconds.  3. Then gently bend the toes in the other direction. Gently press on them until your foot is pointed. Hold for 30 to 60 seconds.  4. Repeat 5 times, or as instructed.  © 7891-3202 [employer]. [address] [place] [address]. All rights reserved. This information is not intended as a substitute for professional medical care. Always follow your healthcare professional's instructions.              Diabetes: Inspecting Your Feet  Diabetes increases your chances of developing foot problems. So inspect your feet every day. This helps you find small  skin irritations before they become serious infections. If you have trouble seeing the bottoms of your feet, use a mirror or ask a family member or friend to help.     Pressure spots on the bottom of the foot are common areas where problems develop.   How to check your feet  Below are tips to help you look for foot problems. Try to check your feet at the same time each day, such as when you get out of bed in the morning:  · Check the top of each foot. The tops of toes, back of the heel, and outer edge of the foot can get a lot of rubbing from poor-fitting shoes.  · Check the bottom of each foot. Daily wear and tear often leads to problems at pressure spots.  · Check the toes and nails. Fungal infections often occur between toes. Toenail problems can also be a sign of fungal infections or lead to breaks in the skin.  · Check your shoes, too. Loose objects inside a shoe can injure the foot. Use your hand to feel inside your shoes for things like barak, loose stitching, or rough areas that could irritate your skin.  Warning signs  Look for any color changes in the foot. Redness with streaks can signal a severe infection, which needs immediate medical attention. Tell your doctor right away if you have any of these problems:  · Swelling, sometimes with color changes, may be a sign of poor blood flow or infection. Symptoms include tenderness and an increase in the size of your foot.  · Warm or hot areas on your feet may be signs of infection. A foot that is cold may not be getting enough blood.  · Sensations such as burning, tingling, or pins and needles can be signs of a problem. Also check for areas that may be numb.  · Hot spots are caused by friction or pressure. Look for hot spots in areas that get a lot of rubbing. Hot spots can turn into blisters, calluses, or sores.  · Cracks and sores are caused by dry or irritated skin. They are a sign that the skin is breaking down, which can lead to infection.  · Toenail  problems to watch for include nails growing into the skin (ingrown toenail) and causing redness or pain. Thick, yellow, or discolored nails can signal a fungal infection.  · Drainage and odor can develop from untreated sores and ulcers. Call your doctor right away if you notice white or yellow drainage, bleeding, or unpleasant odor.   © 9617-1330 [employer]. [address] [place] [address]. All rights reserved. This information is not intended as a substitute for professional medical care. Always follow your healthcare professional's instructions.        Step-by-Step:  Inspecting Your Feet (Diabetes)    Date Last Reviewed: 10/1/2016  © 3032-1294 [employer]. [address] [place] [address]. All rights reserved. This information is not intended as a substitute for professional medical care. Always follow your healthcare professional's instructions.

## 2019-09-03 NOTE — PROGRESS NOTES
Subjective:      Patient ID: Tonya Rivas is a 39 y.o. female.    Chief Complaint: Diabetes Mellitus (Plantar fasciitis right foot Pcp Dr. Duncan 7/3/19); Diabetic Foot Exam; Heel Pain; and Foot Pain    Tonya is a 39 y.o. female who presents to the clinic for evaluation and treatment of high risk feet. 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  She presents to the clinic complaining of bilateral plantar medial heel pain (right greater than left), especially with the first step in the morning. The pain is described as Aching, Burning and Throbbing. The onset of the pain was gradual and has worsened over the past several months. Tonya Rivas rates the pain as 8/10. she denies a history of trauma. she relates pain began following increased activity.  Patient relates she began to do exercise program in her home on the concrete floors in flexible Nike tennis shoe.  Prior treatments include 800 mg ibuprofen twice daily with some relief.    Shoe gear:  allegria with inserts  Hours on Feet: 6+Exercise: moderately active    This patient has documented high risk feet requiring routine maintenance secondary to diabetes mellitis and those secondary complications of diabetes, as mentioned..    PCP: Hernando Duncan MD    Date Last Seen by PCP:   Chief Complaint   Patient presents with    Diabetes Mellitus     Plantar fasciitis right foot Pcp Dr. Duncan 7/3/19    Diabetic Foot Exam    Heel Pain    Foot Pain         Current shoe gear:  allegria    Hemoglobin A1C   Date Value Ref Range Status   2019 9.5 (H) 4.0 - 5.6 % Final     Comment:     ADA Screening Guidelines:  5.7-6.4%  Consistent with prediabetes  >or=6.5%  Consistent with diabetes  High levels of fetal hemoglobin interfere with the HbA1C  assay.  "Heterozygous hemoglobin variants (HbS, HgC, etc)do  not significantly interfere with this assay.   However, presence of multiple variants may affect accuracy.     2019 8.7 (H) 4.0 - 5.6 % Final     Comment:     ADA Screening Guidelines:  5.7-6.4%  Consistent with prediabetes  >or=6.5%  Consistent with diabetes  High levels of fetal hemoglobin interfere with the HbA1C  assay. Heterozygous hemoglobin variants (HbS, HgC, etc)do  not significantly interfere with this assay.   However, presence of multiple variants may affect accuracy.     2018 9.7 (H) 4.0 - 5.6 % Final     Comment:     ADA Screening Guidelines:  5.7-6.4%  Consistent with prediabetes  >or=6.5%  Consistent with diabetes  High levels of fetal hemoglobin interfere with the HbA1C  assay. Heterozygous hemoglobin variants (HbS, HgC, etc)do  not significantly interfere with this assay.   However, presence of multiple variants may affect accuracy.             Patient Active Problem List   Diagnosis    Uncontrolled type 2 diabetes mellitus with hyperglycemia started on insulin 2019    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    History of herpes simplex    Type 2 diabetes mellitus affecting pregnancy in first trimester, antepartum    Missed     S/P suction dilation and curettage    Obstructive sleep apnea       Current Outpatient Medications on File Prior to Visit   Medication Sig Dispense Refill    aspirin 81 MG Chew Take 81 mg by mouth once daily.      augmented betamethasone dipropionate (DIPROLENE-AF) 0.05 % cream Apply to affected areas of fingers BID prn psoriasis. 50 g 1    BD ULTRA-FINE MINI PEN NEEDLE 31 gauge x 3/16" Ndle Use 8 needles per day 100 each 11    blood sugar diagnostic (ONETOUCH ULTRA BLUE TEST STRIP) Strp 1 strip by Misc.(Non-Drug; Combo Route) route 4 (four) times daily. 360 strip 3    dulaglutide (TRULICITY) 1.5 mg/0.5 mL PnIj Inject 1.5 mg into the skin every 7 days. 1 Syringe 11    insulin NPH " "isoph U-100 human (HUMULIN N NPH INSULIN KWIKPEN) 100 unit/mL (3 mL) InPn 50 units in evening, increase by 2 units every 3 days goal AM glc 100-130 1 Box 11    insulin syringe-needle U-100 (BD LO-DOSE MICRO-FINE IV) 1/2 mL 28 gauge x 1/2" Syrg 1 each by Misc.(Non-Drug; Combo Route) route 4 (four) times daily. 100 each 1    insulin syringe-needle U-100 0.5 mL 31 gauge x 5/16" Syrg us as directed three times daily 100 each 0    metFORMIN (GLUCOPHAGE) 1000 MG tablet Take 1 tablet (1,000 mg total) by mouth 2 (two) times daily with meals. 180 tablet 0    pen needle, diabetic 31 gauge x 1/4" Ndle Utilize 1 pen needle with each injection: 4 - 6 daily 100 each 11    prenatal 25/iron/folate 6/dha (PRENA1 ORAL) Take by mouth.       No current facility-administered medications on file prior to visit.        Review of patient's allergies indicates:  No Known Allergies    Past Surgical History:   Procedure Laterality Date     SECTION      x3    DILATION AND CURETTAGE, UTERUS, USING SUCTION N/A 2019    [facility]    EXTRACORPOREAL SHOCK WAVE LITHOTRIPSY      LAPAROSCOPIC GASTRIC BANDING         Family History   Problem Relation Age of Onset    Cancer Mother     Learning disabilities Son     Heart disease Maternal Grandfather     Breast cancer Neg Hx     Colon cancer Neg Hx     Ovarian cancer Neg Hx        Social History     Socioeconomic History    Marital status:      Spouse name: Not on file    Number of children: Not on file    Years of education: Not on file    Highest education level: Not on file   Occupational History    Occupation: RN     Comment: Dialysis   Social Needs    Financial resource strain: Not hard at all    Food insecurity:     Worry: Not on file     Inability: Not on file    Transportation needs:     Medical: Not on file     Non-medical: Not on file   Tobacco Use    Smoking status: Never Smoker    Smokeless tobacco: Never Used " "  Substance and Sexual Activity    Alcohol use: No     Frequency: Monthly or less     Drinks per session: 1 or 2     Binge frequency: Never     Comment: social    Drug use: No    Sexual activity: Yes     Partners: Male     Birth control/protection: None   Lifestyle    Physical activity:     Days per week: 3 days     Minutes per session: 30 min    Stress: Rather much   Relationships    Social connections:     Talks on phone: Twice a week     Gets together: Twice a week     Attends Mandaeism service: Not on file     Active member of club or organization: No     Attends meetings of clubs or organizations: Patient refused     Relationship status:    Other Topics Concern    Are you pregnant or think you may be? No    Breast-feeding No   Social History Narrative    Not on file       Review of Systems   Constitution: Negative for chills and fever.   Cardiovascular: Negative for claudication and leg swelling.   Respiratory: Negative for cough and shortness of breath.    Skin: Positive for dry skin. Negative for itching, nail changes and rash.   Musculoskeletal: Positive for joint pain and myalgias. Negative for falls, joint swelling and muscle weakness.   Gastrointestinal: Negative for diarrhea, nausea and vomiting.   Neurological: Positive for paresthesias. Negative for numbness, tremors and weakness.   Psychiatric/Behavioral: Negative for altered mental status and hallucinations.           Objective:      Vitals:    09/03/19 0712   Weight: 99.8 kg (220 lb)   Height: 5' 8" (1.727 m)   PainSc:   8   PainLoc: Foot       Physical Exam   Constitutional:  Non-toxic appearance. She does not have a sickly appearance. No distress.   Cardiovascular:   Pulses:       Dorsalis pedis pulses are 2+ on the right side, and 2+ on the left side.        Posterior tibial pulses are 2+ on the right side, and 2+ on the left side.   Pulmonary/Chest: No respiratory distress.   Musculoskeletal:        Right ankle: She exhibits " decreased range of motion. She exhibits no swelling. No tenderness. No lateral malleolus, no medial malleolus, no AITFL, no CF ligament and no posterior TFL tenderness found. Achilles tendon exhibits no pain, no defect and normal Chapa's test results.        Left ankle: She exhibits decreased range of motion. She exhibits no swelling. No tenderness. No lateral malleolus, no medial malleolus, no AITFL, no CF ligament and no posterior TFL tenderness found. Achilles tendon exhibits no pain, no defect and normal Chapa's test results.        Right foot: There is no bony tenderness.        Left foot: There is no bony tenderness.   Biomechanical exam: Pain on palpation bilateral medial calcaneal tubercle at origin of plantar fascia. There is equinus deformity bilateral with decreased dorsiflexion at the ankle joint bilateral. No tenderness with compression of heel. Negative tinels sign. Gait analysis reveals excessive pronation through midstance and propulsion with early heel off. Shoes reveals lateral heel counter wear bilateral     Decreased first MPJ range of motion both weightbearing and nonweightbearing, no crepitus observed the first MP joint, + dorsal flag sign. Mild  bunion deformity is observed .    Patient has hammertoes of digits 2-5 bilateral partially reducible      Neurological: She has normal reflexes. She displays no atrophy and no tremor. No sensory deficit. She exhibits normal muscle tone.   Skin: Skin is warm, dry and intact. No bruising, no burn, no laceration, no lesion and no rash noted. She is not diaphoretic. No cyanosis. No pallor. Nails show no clubbing.   Psychiatric: Her mood appears not anxious. Her affect is not inappropriate. Her speech is not slurred. She is not combative. She is communicative. She is attentive.   Nursing note reviewed.            Assessment:       Encounter Diagnoses   Name Primary?    Comprehensive diabetic foot examination, type 2 DM, encounter for Yes    Foot  pain, bilateral     Plantar fasciitis     Hammer toes of both feet     Hallux limitus, acquired, left     Hallux limitus, acquired, right          Plan:     Problem List Items Addressed This Visit     None      Visit Diagnoses     Comprehensive diabetic foot examination, type 2 DM, encounter for    -  Primary    Foot pain, bilateral        Plantar fasciitis        Hammer toes of both feet        Hallux limitus, acquired, left        Hallux limitus, acquired, right             I counseled the patient on her conditions, their implications and medical management.    Orders Placed This Encounter    meloxicam (MOBIC) 15 MG tablet       Greater than 50% of this visit spent on counseling and coordination of care.    Education about the diabetic foot, neuropathy, and prevention of limb loss.    Shoe inspection. Diabetic Foot Education. Patient reminded of the importance of good nutrition/healthy diet/weight management and blood sugar control to help prevent podiatric complications of diabetes. Patient instructed on proper foot hygeine. Wear comfortable, proper fitting shoes. Wash feet daily. Dry well. After drying, apply moisturizer to feet (no lotion to webspaces). Inspect feet daily for skin breaks, blisters, swelling, or redness. Wear cotton socks (preferably white)  Change socks every day. Do NOT walk barefoot. Do NOT use heating pads or hot water soaks. We discussed wearing proper shoe gear, daily foot inspections, never walking without protective shoe gear.     Discussed different treatment options for heel pain. I gave written and verbal instructions on stretching exercises. Patient expressed understanding. Discussed icing the affected area as needed and also wearing appropriate shoe gear and avoiding flats, slippers, sandals, and going barefoot. My recommendation for OTC supports is [employer]. Patient instructed on adequate icing techniques. Patient should ice the affected area at least once per day x  10 minutes for 10 days . I advised the patient that extra icing would also be beneficial to ensure adequate anti inflammatory effect. We also discussed cortisone injections and NSAID therapy. Mobic prescribed. Patient was instructed on dosing information. Discontinue if adverse effects occur     Discussed in detail how uncontrolled diabetes will contribute to sluggish healing.  Encouraged patient to continue to attempt to get blood glucose under control.    RTC in 7-10 weeks if no improvement, at this time she will receive cortisone injections and referral to PT. Patient is amenable to plan.

## 2019-09-06 ENCOUNTER — TELEPHONE (OUTPATIENT)
Dept: FAMILY MEDICINE | Facility: CLINIC | Age: 39
End: 2019-09-06

## 2019-09-06 NOTE — TELEPHONE ENCOUNTER
----- Message from Luann Sharon sent at 9/6/2019  3:31 PM CDT -----  Contact: Mihai   Type: Patient Call Back    Who called: mihai-     What is the request in detail: Asking for labs, office notes and medical diagnosis.     Can the clinic reply by [facility]?    Would the patient rather a call back or a response via [facility]?  Call    Best call back number: [phone] or [phone]

## 2019-09-13 ENCOUNTER — PATIENT MESSAGE (OUTPATIENT)
Dept: FAMILY MEDICINE | Facility: CLINIC | Age: 39
End: 2019-09-13

## 2019-09-13 DIAGNOSIS — Z79.4 CONTROLLED TYPE 2 DIABETES MELLITUS WITHOUT COMPLICATION, WITH LONG-TERM CURRENT USE OF INSULIN: ICD-10-CM

## 2019-09-13 DIAGNOSIS — O24.111 TYPE 2 DIABETES MELLITUS AFFECTING PREGNANCY IN FIRST TRIMESTER, ANTEPARTUM: Primary | ICD-10-CM

## 2019-09-13 DIAGNOSIS — E11.9 CONTROLLED TYPE 2 DIABETES MELLITUS WITHOUT COMPLICATION, WITH LONG-TERM CURRENT USE OF INSULIN: ICD-10-CM

## 2019-09-13 NOTE — TELEPHONE ENCOUNTER
Can we call pharmacy - trulicity apparently no longer covered. I sent in rx for Bydureon - can they see if that is covered under formulary?

## 2019-09-16 ENCOUNTER — TELEPHONE (OUTPATIENT)
Dept: FAMILY MEDICINE | Facility: CLINIC | Age: 39
End: 2019-09-16

## 2019-09-16 ENCOUNTER — PATIENT MESSAGE (OUTPATIENT)
Dept: FAMILY MEDICINE | Facility: CLINIC | Age: 39
End: 2019-09-16

## 2019-09-17 ENCOUNTER — PATIENT MESSAGE (OUTPATIENT)
Dept: FAMILY MEDICINE | Facility: CLINIC | Age: 39
End: 2019-09-17

## 2019-09-17 RX ORDER — MELOXICAM 15 MG/1
15 TABLET ORAL DAILY
Qty: 30 TABLET | Refills: 1 | Status: SHIPPED | OUTPATIENT
Start: 2019-09-17 | End: 2020-09-16

## 2019-09-17 NOTE — TELEPHONE ENCOUNTER
Our staff tried to get PA for trulicity and bydureon - both apparently not covered - I [facility] to ask for assistance with formulary/prior auth

## 2019-09-21 ENCOUNTER — LAB VISIT (OUTPATIENT)
Dept: LAB | Facility: HOSPITAL | Age: 39
End: 2019-09-21
Attending: FAMILY MEDICINE
Payer: COMMERCIAL

## 2019-09-21 ENCOUNTER — OFFICE VISIT (OUTPATIENT)
Dept: FAMILY MEDICINE | Facility: CLINIC | Age: 39
End: 2019-09-21
Payer: COMMERCIAL

## 2019-09-21 VITALS
OXYGEN SATURATION: 98 % | HEART RATE: 84 BPM | SYSTOLIC BLOOD PRESSURE: 124 MMHG | DIASTOLIC BLOOD PRESSURE: 70 MMHG | HEIGHT: 68 IN | TEMPERATURE: 98 F | WEIGHT: 220.69 LBS | BODY MASS INDEX: 33.45 KG/M2

## 2019-09-21 DIAGNOSIS — Z00.00 ANNUAL PHYSICAL EXAM: ICD-10-CM

## 2019-09-21 DIAGNOSIS — Z00.00 ANNUAL PHYSICAL EXAM: Primary | ICD-10-CM

## 2019-09-21 LAB
ALBUMIN SERPL BCP-MCNC: 3.7 G/DL (ref 3.5–5.2)
ALP SERPL-CCNC: 83 U/L (ref 55–135)
ALT SERPL W/O P-5'-P-CCNC: 27 U/L (ref 10–44)
ANION GAP SERPL CALC-SCNC: 9 MMOL/L (ref 8–16)
AST SERPL-CCNC: 22 U/L (ref 10–40)
BASOPHILS # BLD AUTO: 0.04 K/UL (ref 0–0.2)
BASOPHILS NFR BLD: 0.8 % (ref 0–1.9)
BILIRUB SERPL-MCNC: 0.4 MG/DL (ref 0.1–1)
BUN SERPL-MCNC: 7 MG/DL (ref 6–20)
CALCIUM SERPL-MCNC: 8.4 MG/DL (ref 8.7–10.5)
CHLORIDE SERPL-SCNC: 106 MMOL/L (ref 95–110)
CHOLEST SERPL-MCNC: 166 MG/DL (ref 120–199)
CHOLEST/HDLC SERPL: 3.3 {RATIO} (ref 2–5)
CO2 SERPL-SCNC: 25 MMOL/L (ref 23–29)
CREAT SERPL-MCNC: 0.7 MG/DL (ref 0.5–1.4)
DIFFERENTIAL METHOD: ABNORMAL
EOSINOPHIL # BLD AUTO: 0.1 K/UL (ref 0–0.5)
EOSINOPHIL NFR BLD: 1.9 % (ref 0–8)
ERYTHROCYTE [DISTWIDTH] IN BLOOD BY AUTOMATED COUNT: 12.6 % (ref 11.5–14.5)
EST. GFR  (AFRICAN AMERICAN): >60 ML/MIN/1.73 M^2
EST. GFR  (NON AFRICAN AMERICAN): >60 ML/MIN/1.73 M^2
ESTIMATED AVG GLUCOSE: 154 MG/DL (ref 68–131)
GLUCOSE SERPL-MCNC: 202 MG/DL (ref 70–110)
HBA1C MFR BLD HPLC: 7 % (ref 4–5.6)
HCT VFR BLD AUTO: 42.5 % (ref 37–48.5)
HDLC SERPL-MCNC: 50 MG/DL (ref 40–75)
HDLC SERPL: 30.1 % (ref 20–50)
HGB BLD-MCNC: 13.2 G/DL (ref 12–16)
IMM GRANULOCYTES # BLD AUTO: 0.02 K/UL (ref 0–0.04)
IMM GRANULOCYTES NFR BLD AUTO: 0.4 % (ref 0–0.5)
LDLC SERPL CALC-MCNC: 105.4 MG/DL (ref 63–159)
LYMPHOCYTES # BLD AUTO: 1.7 K/UL (ref 1–4.8)
LYMPHOCYTES NFR BLD: 34.6 % (ref 18–48)
MCH RBC QN AUTO: 28 PG (ref 27–31)
MCHC RBC AUTO-ENTMCNC: 31.1 G/DL (ref 32–36)
MCV RBC AUTO: 90 FL (ref 82–98)
MONOCYTES # BLD AUTO: 0.4 K/UL (ref 0.3–1)
MONOCYTES NFR BLD: 8.7 % (ref 4–15)
NEUTROPHILS # BLD AUTO: 2.6 K/UL (ref 1.8–7.7)
NEUTROPHILS NFR BLD: 53.6 % (ref 38–73)
NONHDLC SERPL-MCNC: 116 MG/DL
NRBC BLD-RTO: 0 /100 WBC
PLATELET # BLD AUTO: 316 K/UL (ref 150–350)
PMV BLD AUTO: 10 FL (ref 9.2–12.9)
POTASSIUM SERPL-SCNC: 3.7 MMOL/L (ref 3.5–5.1)
PROT SERPL-MCNC: 7 G/DL (ref 6–8.4)
RBC # BLD AUTO: 4.72 M/UL (ref 4–5.4)
SODIUM SERPL-SCNC: 140 MMOL/L (ref 136–145)
TRIGL SERPL-MCNC: 53 MG/DL (ref 30–150)
TSH SERPL DL<=0.005 MIU/L-ACNC: 0.92 UIU/ML (ref 0.4–4)
WBC # BLD AUTO: 4.85 K/UL (ref 3.9–12.7)

## 2019-09-21 PROCEDURE — 99999 PR PBB SHADOW E&M-EST. PATIENT-LVL III: ICD-10-PCS | Mod: PBBFAC,,, | Performed by: FAMILY MEDICINE

## 2019-09-21 PROCEDURE — 99999 PR PBB SHADOW E&M-EST. PATIENT-LVL III: CPT | Mod: PBBFAC,,, | Performed by: FAMILY MEDICINE

## 2019-09-21 PROCEDURE — 80053 COMPREHEN METABOLIC PANEL: CPT

## 2019-09-21 PROCEDURE — 99396 PREV VISIT EST AGE 40-64: CPT | Mod: S$GLB,,, | Performed by: FAMILY MEDICINE

## 2019-09-21 PROCEDURE — 80061 LIPID PANEL: CPT

## 2019-09-21 PROCEDURE — 99396 PR PREVENTIVE VISIT,EST,40-64: ICD-10-PCS | Mod: S$GLB,,, | Performed by: FAMILY MEDICINE

## 2019-09-21 PROCEDURE — 36415 COLL VENOUS BLD VENIPUNCTURE: CPT | Mod: PO

## 2019-09-21 PROCEDURE — 84443 ASSAY THYROID STIM HORMONE: CPT

## 2019-09-21 PROCEDURE — 83036 HEMOGLOBIN GLYCOSYLATED A1C: CPT

## 2019-09-21 PROCEDURE — 85025 COMPLETE CBC W/AUTO DIFF WBC: CPT

## 2019-09-21 RX ORDER — FLUCONAZOLE 150 MG/1
TABLET ORAL
Refills: 4 | COMMUNITY
Start: 2019-09-03 | End: 2019-11-04 | Stop reason: SDUPTHER

## 2019-09-21 NOTE — PROGRESS NOTES
"Subjective:       Patient ID: Tonya Rivas is a 39 y.o. female.    Chief Complaint: Annual Exam    39 year old female presents for an annual exam. She had diabetes, sleep apnea. She states she is seeing her endocrinologist, Dr. Herrera for management of her diabetes. She is taking metformin 1000 mg BID and was put on truclicity. She has been off trulicity is is no longer covered by her insurance. She was just approved for bydureon. She has been doing low carbs.  Her blood sugars are are 140-160's in the morning, but is only on metformin and intermittent insulin. She denies hypoglycemia. She is taking 12-15 units of Humulin N when she feels her blood sugars are elevated since she does not have the [place] available and ByBitGymreon approval is pending.       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  No date: Diabetes mellitus type II      Comment:  uncontrolled  No date: Herpes simplex virus (HSV) infection      Comment:  "cold sores"  No date: History of  delivery, currently pregnant x2  No date: Kidney stones  2013: Obesity   Past Surgical History:  No date:  SECTION      Comment:  x3  2019: DILATION AND CURETTAGE, UTERUS, USING SUCTION; N/A      Comment:  Performed by [facility] at [facility] OR  No date: EXTRACORPOREAL SHOCK WAVE LITHOTRIPSY  2005: LAPAROSCOPIC GASTRIC BANDING  Review of patient's family history indicates:  Problem: Cancer      Relation: Mother          Age of Onset: (Not Specified)  Problem: Learning disabilities      Relation: Son          Age of Onset: (Not Specified)  Problem: Heart disease      Relation: Maternal Grandfather          Age of Onset: (Not Specified)  Problem: Breast cancer      Relation: Neg Hx          Age of Onset: (Not Specified)  Problem: Colon cancer      Relation: Neg Hx          Age of Onset: (Not Specified)  Problem: Ovarian cancer      Relation: Neg Hx          Age of Onset: (Not " "Specified)    Social History    Socioeconomic History      Marital status:       Spouse name: Not on file      Number of children: Not on file      Years of education: Not on file      Highest education level: Not on file    Occupational History      Occupation: RN        Comment: Dialysis    Social Needs      Financial resource strain: Not hard at all      Food insecurity:        Worry: Not on file        Inability: Not on file      Transportation needs:        Medical: Not on file        Non-medical: Not on file    Tobacco Use      Smoking status: Never Smoker      Smokeless tobacco: Never Used    Substance and Sexual Activity      Alcohol use: No        Frequency: Monthly or less        Drinks per session: 1 or 2        Binge frequency: Never        Comment: social      Drug use: No      Sexual activity: Yes        Partners: Male        Birth control/protection: None    Lifestyle      Physical activity:        Days per week: 3 days        Minutes per session: 30 min      Stress: Rather much    Relationships      Social connections:        Talks on phone: Twice a week        Gets together: Twice a week        Attends Hinduism service: Not on file        Active member of club or organization: No        Attends meetings of clubs or organizations: Patient refused        Relationship status:     Other Topics      Concerns:        Are you pregnant or think you may be?: No        Breast-feeding: No    Social History Narrative      Not on file            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     Diabetes mellitus type II     uncontrolled    Herpes simplex virus (HSV) infection     "cold sores"    History of  delivery, currently pregnant x2     Kidney stones     Obesity 2013      Past Surgical History:   Procedure Laterality Date     SECTION      x3    DILATION AND CURETTAGE, UTERUS, USING SUCTION N/A 2019    Performed by " [facility] at [facility]    EXTRACORPOREAL SHOCK WAVE LITHOTRIPSY      LAPAROSCOPIC GASTRIC BANDING  2005     Family History   Problem Relation Age of Onset    Cancer Mother     Learning disabilities Son     Heart disease Maternal Grandfather     Breast cancer Neg Hx     Colon cancer Neg Hx     Ovarian cancer Neg Hx      Social History     Socioeconomic History    Marital status:      Spouse name: Not on file    Number of children: Not on file    Years of education: Not on file    Highest education level: Not on file   Occupational History    Occupation: RN     Comment: Dialysis   Social Needs    Financial resource strain: Not hard at all    Food insecurity:     Worry: Not on file     Inability: Not on file    Transportation needs:     Medical: Not on file     Non-medical: Not on file   Tobacco Use    Smoking status: Never Smoker    Smokeless tobacco: Never Used   Substance and Sexual Activity    Alcohol use: No     Frequency: Monthly or less     Drinks per session: 1 or 2     Binge frequency: Never     Comment: social    Drug use: No    Sexual activity: Yes     Partners: Male     Birth control/protection: None   Lifestyle    Physical activity:     Days per week: 3 days     Minutes per session: 30 min    Stress: Rather much   Relationships    Social connections:     Talks on phone: Twice a week     Gets together: Twice a week     Attends Catholic service: Not on file     Active member of club or organization: No     Attends meetings of clubs or organizations: Patient refused     Relationship status:    Other Topics Concern    Are you pregnant or think you may be? No    Breast-feeding No   Social History Narrative    Not on file       Review of Systems   Constitutional: Positive for activity change and unexpected weight change.   HENT: Negative for hearing loss, rhinorrhea and trouble swallowing.    Eyes: Negative for discharge and visual disturbance.   Respiratory:  "Positive for chest tightness. Negative for cough, shortness of breath and wheezing.    Cardiovascular: Negative for chest pain and palpitations.   Gastrointestinal: Negative for abdominal pain, blood in stool, constipation, diarrhea and vomiting.   Endocrine: Negative for polydipsia and polyuria.   Genitourinary: Negative for difficulty urinating, dysuria, frequency, hematuria and menstrual problem.   Musculoskeletal: Negative for arthralgias, joint swelling and neck pain.   Neurological: Negative for dizziness, syncope, weakness, light-headedness and headaches.   Psychiatric/Behavioral: Negative for confusion and dysphoric mood. The patient is nervous/anxious.        Objective:       Vitals:    09/21/19 0840   BP: 124/70   Pulse: 84   Temp: 98 °F (36.7 °C)   TempSrc: Oral   SpO2: 98%   Weight: 100.1 kg (220 lb 10.9 oz)   Height: 5' 8" (1.727 m)       Physical Exam   Constitutional: She is oriented to person, place, and time. She appears well-developed and well-nourished. No distress.   HENT:   Head: Normocephalic and atraumatic.   Eyes: Conjunctivae are normal.   Neck: Normal range of motion. Neck supple. Carotid bruit is not present.   Cardiovascular: Normal rate, regular rhythm and normal heart sounds. Exam reveals no gallop and no friction rub.   No murmur heard.  Pulmonary/Chest: Effort normal and breath sounds normal. No stridor. No respiratory distress. She has no wheezes. She has no rales.   Abdominal: Soft. Bowel sounds are normal. She exhibits no distension and no mass. There is no tenderness. There is no rebound and no guarding. No hernia.   Musculoskeletal: She exhibits no edema.   Neurological: She is alert and oriented to person, place, and time.   Skin: She is not diaphoretic.       Assessment:       1. Annual physical exam        Plan:       Tonya was seen today for annual exam.    Diagnoses and all orders for this visit:    Annual physical exam  -     Hemoglobin A1c; Future  -     Lipid panel; " Future  -     Microalbumin/creatinine urine ratio; Future  -     Comprehensive metabolic panel; Future  -     CBC auto differential; Future  -     TSH; Future     Will order labs.  Discussed diabetes management, which includes low carb.  She should have 45 g of carbs per day.  Advised to use a food diary.        Her Pap smear mammograms are up-to-date

## 2019-09-23 ENCOUNTER — PATIENT MESSAGE (OUTPATIENT)
Dept: FAMILY MEDICINE | Facility: CLINIC | Age: 39
End: 2019-09-23

## 2019-09-26 ENCOUNTER — PATIENT MESSAGE (OUTPATIENT)
Dept: FAMILY MEDICINE | Facility: CLINIC | Age: 39
End: 2019-09-26

## 2019-09-27 ENCOUNTER — PATIENT MESSAGE (OUTPATIENT)
Dept: FAMILY MEDICINE | Facility: CLINIC | Age: 39
End: 2019-09-27

## 2019-10-02 ENCOUNTER — PATIENT MESSAGE (OUTPATIENT)
Dept: FAMILY MEDICINE | Facility: CLINIC | Age: 39
End: 2019-10-02

## 2019-10-02 ENCOUNTER — PATIENT OUTREACH (OUTPATIENT)
Dept: ADMINISTRATIVE | Facility: HOSPITAL | Age: 39
End: 2019-10-02

## 2019-10-02 NOTE — LETTER
AUTHORIZATION FOR RELEASE OF   CONFIDENTIAL INFORMATION    Dear Neri Peña OD,    We are seeing Tonya Rivas, date of birth 1980, in the clinic at [facility] FAMILY [facility]/ INTERNAL MED/ PEDS. Hernando Duncan MD is the patient's PCP. Tonya iRvas has an outstanding lab/procedure at the time we reviewed her chart. In order to help keep her health information updated, she has authorized us to request the following medical record(s):        (  )  MAMMOGRAM                                      (  )  COLONOSCOPY      (  )  PAP SMEAR                                          (  )  OUTSIDE LAB RESULTS     (  )  DEXA SCAN                                          ( X ) DIABETIC  EYE EXAM            (  )  FOOT EXAM                                          (  )  ENTIRE RECORD     (  )  OUTSIDE IMMUNIZATIONS                 (  )  _______________         Please fax records to [facility], Marvin P Dair, MD,  [phone]  [address] [place] [facility] [address]      If you have any questions, please contact Franklyn Baum MA,[facility] at [phone].             Patient Name: Tonya Rivas  : 1980  Patient Phone #: [phone]

## 2019-10-07 ENCOUNTER — TELEPHONE (OUTPATIENT)
Dept: DERMATOLOGY | Facility: CLINIC | Age: 39
End: 2019-10-07

## 2019-10-07 DIAGNOSIS — Z79.4 CONTROLLED TYPE 2 DIABETES MELLITUS WITHOUT COMPLICATION, WITH LONG-TERM CURRENT USE OF INSULIN: ICD-10-CM

## 2019-10-07 DIAGNOSIS — E11.9 CONTROLLED TYPE 2 DIABETES MELLITUS WITHOUT COMPLICATION, WITH LONG-TERM CURRENT USE OF INSULIN: ICD-10-CM

## 2019-10-07 RX ORDER — METFORMIN HYDROCHLORIDE 1000 MG/1
1000 TABLET ORAL 2 TIMES DAILY WITH MEALS
Qty: 180 TABLET | Refills: 3 | Status: SHIPPED | OUTPATIENT
Start: 2019-10-07

## 2019-10-07 NOTE — TELEPHONE ENCOUNTER
----- Message from Be Grewal sent at 10/7/2019 10:43 AM CDT -----  Contact: Natty (PACS)  Name of Who is Calling: Natty (PACS) ext [phone]      What is the request in detail: Would like to speak with staff in regards to getting a receipt for the cream from 05/09/19 visit for $105.92. PACS (Billing) states they have the office visit but they cannot see any pharmaceutical transaction. Please send to patient [facility] portal. Patient is upset due to her [facility] account is locked.      Can the clinic reply by [facility]: no      What Number to Call Back if not in [facility]: [phone] (patient)

## 2019-10-21 ENCOUNTER — PATIENT MESSAGE (OUTPATIENT)
Dept: FAMILY MEDICINE | Facility: CLINIC | Age: 39
End: 2019-10-21

## 2019-10-25 ENCOUNTER — PATIENT OUTREACH (OUTPATIENT)
Dept: ADMINISTRATIVE | Facility: OTHER | Age: 39
End: 2019-10-25

## 2019-10-31 ENCOUNTER — PATIENT MESSAGE (OUTPATIENT)
Dept: FAMILY MEDICINE | Facility: CLINIC | Age: 39
End: 2019-10-31

## 2019-11-01 ENCOUNTER — PATIENT MESSAGE (OUTPATIENT)
Dept: FAMILY MEDICINE | Facility: CLINIC | Age: 39
End: 2019-11-01

## 2019-11-01 DIAGNOSIS — O24.111 TYPE 2 DIABETES MELLITUS AFFECTING PREGNANCY IN FIRST TRIMESTER, ANTEPARTUM: ICD-10-CM

## 2019-11-01 DIAGNOSIS — E11.9 TYPE 2 DIABETES MELLITUS WITHOUT COMPLICATION, UNSPECIFIED WHETHER LONG TERM INSULIN USE: ICD-10-CM

## 2019-11-01 NOTE — PROGRESS NOTES
This note was created by combination of typed  and M-Modal dictation.  Transcription errors may be present.  If there are any questions, please contact me.    Assessment / Plan:   Cellulitis and abscess of right leg  -status post incision and drainage at urgent care on Friday, but sounds like it is progressing despite Augmentin.  Staff?  Cultures were obtained but she has not been notified of results yet.  Change Augmentin to Bactrim.  By exam I do not see anything to repeat I&D today.  The wound is shallow enough that I do not think it needs further repacking.  Keep clean, dry, soap and water, Vaseline if necessary, and follow up with me if no improvement.  -     fluconazole (DIFLUCAN) 150 MG Tab; TAKE ONE TABLET BY MOUTH ONCE AND MAY REPEAT IN 3 DAYS IF STILL SYMPTOMATIC  Dispense: 2 tablet; Refill: 0  -     sulfamethoxazole-trimethoprim 800-160mg (BACTRIM DS) 800-160 mg Tab; Take 1 tablet by mouth 2 (two) times daily. for 10 days  Dispense: 20 tablet; Refill: 0    Type 2 diabetes mellitus affecting pregnancy in first trimester, antepartum  -taking metformin, Bydureon, taking sliding scale insulin if necessary.  Her last A1c by report was 7.0 from her endocrinologist.  We are trying to get outside records for her eye exam.          There are no discontinued medications.    meds sent this encounter:       Follow Up: No follow-ups on file.      Subjective:     Chief Complaint   Patient presents with    Cyst     right thigh       HPI  Tonya is a 39 y.o. female, last appointment with this clinic was 7/3/2019.    No LMP recorded. (Menstrual status: Other).    Last seen 7/2019  DM at that time on trulicity and metformin, wary of jardiance. Restarted on NPH  9/2019 a1c 7.0    Followed by Sharon mccann no longer covered by insurance. bydureon covered.    9/26 refilled bydureon  Not on statin - possibly conceiving in the future    Went to urgent care for cyst that developed on the right proximal thigh, not  previous had issues with that, no injury, she had it incised and drained and reports that pus came out.  She had packed.  But she notes that over the weekend the area of induration seem to worsen.  She has been taking Augmentin.  The urgent care is supposed to contact her with the results of the culture but she has not heard from them yet.    No systemic fevers or chills.    She wonders if he needs further incision and drainage or repacking.    Diabetes, followed by Endocrinology, on metformin, on Bydureon but does not like Bydureon because the needles are too large.  Victoza I believe was not covered/non formulary.  I have asked her to look into Ozempic.    She sees Dr. Peña for eyes, we've been trying to get records,she will contact them to send to us.  eROI was sent 10/2/2019    She notes that her sugars have been doing good but with this current infection her sugars have been going up into the 200 range.  So she has been using NovoLog on sliding scale for the past few days.    Reportedly her last A1c was 7.0 with her endocrinologist.    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    History of herpes simplex 2019    Type 2 diabetes mellitus affecting pregnancy in first trimester, antepartum 2019    Uncontrolled type 2 diabetes mellitus with hyperglycemia started on insulin 2019 10/05/2012       PAST MEDICAL HISTORY:  Past Medical History:   Diagnosis  "Date    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     Diabetes mellitus type II     uncontrolled    Herpes simplex virus (HSV) infection     "cold sores"    History of  delivery, currently pregnant x2     Kidney stones     Obesity 2013       PAST SURGICAL HISTORY:  Past Surgical History:   Procedure Laterality Date     SECTION      x3    DILATION AND CURETTAGE OF UTERUS USING SUCTION N/A 2019    Procedure: DILATION AND CURETTAGE, UTERUS, USING SUCTION;  Surgeon: Tonya Cavanaugh MD;  Location: [facility];  Service: OB/GYN;  Laterality: N/A;    EXTRACORPOREAL SHOCK WAVE LITHOTRIPSY      LAPAROSCOPIC GASTRIC BANDING         SOCIAL HISTORY:  Social History     Socioeconomic History    Marital status:      Spouse name: Not on file    Number of children: Not on file    Years of education: Not on file    Highest education level: Not on file   Occupational History    Occupation: RN     Comment: Dialysis   Social Needs    Financial resource strain: Not hard at all    Food insecurity:     Worry: Not on file     Inability: Not on file    Transportation needs:     Medical: Not on file     Non-medical: Not on file   Tobacco Use    Smoking status: Never Smoker    Smokeless tobacco: Never Used   Substance and Sexual Activity    Alcohol use: No     Frequency: Monthly or less     Drinks per session: 1 or 2     Binge frequency: Never     Comment: social    Drug use: No    Sexual activity: Yes     Partners: Male     Birth control/protection: None   Lifestyle    Physical activity:     Days per week: 3 days     Minutes per session: 30 min    Stress: Rather much   Relationships    Social connections:     Talks on phone: Twice a week     Gets together: Twice a week     Attends [employer] service: Not on file     Active member of club or organization: No     Attends meetings of clubs or organizations: Patient refused     Relationship status:    Other Topics Concern " "   Are you pregnant or think you may be? No    Breast-feeding No   Social History Narrative    Not on file        ALLERGIES AND MEDICATIONS: updated and reviewed.  Review of patient's allergies indicates:  No Known Allergies  Current Outpatient Medications   Medication Sig Dispense Refill    aspirin 81 MG Chew Take 81 mg by mouth once daily.      augmented betamethasone dipropionate (DIPROLENE-AF) 0.05 % cream Apply to affected areas of fingers BID prn psoriasis. 50 g 1    BD ULTRA-FINE MINI PEN NEEDLE 31 gauge x 3/16" Ndle Use 8 needles per day 100 each 11    blood sugar diagnostic (ONETOUCH ULTRA BLUE TEST STRIP) Strp 1 strip by Misc.(Non-Drug; Combo Route) route 4 (four) times daily. 360 strip 3    exenatide microspheres (BYDUREON) 2 mg/0.65 mL PnIj Inject 2 mg into the skin every 7 days. 4 each 11    fluconazole (DIFLUCAN) 150 MG Tab TAKE ONE TABLET BY MOUTH ONCE AND MAY REPEAT IN 3 DAYS IF STILL SYMPTOMATIC  4    insulin NPH isoph U-100 human (HUMULIN N NPH INSULIN KWIKPEN) 100 unit/mL (3 mL) InPn 50 units in evening, increase by 2 units every 3 days goal AM glc 100-130 1 Box 11    insulin syringe-needle U-100 (BD LO-DOSE MICRO-FINE IV) 1/2 mL 28 gauge x 1/2" Syrg 1 each by Misc.(Non-Drug; Combo Route) route 4 (four) times daily. 100 each 1    insulin syringe-needle U-100 0.5 mL 31 gauge x 5/16" Syrg us as directed three times daily 100 each 0    meloxicam (MOBIC) 15 MG tablet Take 1 tablet (15 mg total) by mouth once daily. 1 pill per day everyday for the next 3 weeks with food 30 tablet 1    metFORMIN (GLUCOPHAGE) 1000 MG tablet Take 1 tablet (1,000 mg total) by mouth 2 (two) times daily with meals. 180 tablet 3    pen needle, diabetic 31 gauge x 1/4" Ndle Utilize 1 pen needle with each injection: 4 - 6 daily 100 each 11     No current facility-administered medications for this visit.        Review of Systems   Eyes: Negative for discharge.   Respiratory: Negative for wheezing.  " "  Cardiovascular: Negative for chest pain and palpitations.   Gastrointestinal: Negative for blood in stool, constipation, diarrhea and vomiting.   Genitourinary: Negative for hematuria.   Neurological: Negative for headaches.       Objective:   Physical Exam   Vitals:    11/04/19 1047   BP: 118/80   Pulse: 92   Temp: 98.7 °F (37.1 °C)   SpO2: 98%   Weight: 97.4 kg (214 lb 13.4 oz)   Height: 5' 8" (1.727 m)    Body mass index is 32.67 kg/m².            Physical Exam   Constitutional: She is oriented to person, place, and time. She appears well-developed and well-nourished. No distress.   HENT:   Head: Normocephalic and atraumatic.   Eyes: No scleral icterus.   Pulmonary/Chest: Effort normal.   Neurological: She is alert and oriented to person, place, and time.   Skin: Skin is warm and dry.   The right medial proximal thigh, there is an area of induration that is about 4 cm in diameter, it does have packing gauze in it.  The packing gauze was removed to reveal a whole that extends may be 5 mm down and it is about 6 mm wide, with granulation tissue.  I feel no underlying fluctuance though there is certainly surrounding erythema and induration that extends maybe 15 mm beyond the edges in all directions.   Psychiatric: She has a normal mood and affect. Her behavior is normal. Thought content normal.     "

## 2019-11-04 ENCOUNTER — OFFICE VISIT (OUTPATIENT)
Dept: FAMILY MEDICINE | Facility: CLINIC | Age: 39
End: 2019-11-04
Payer: COMMERCIAL

## 2019-11-04 VITALS
HEIGHT: 68 IN | SYSTOLIC BLOOD PRESSURE: 118 MMHG | OXYGEN SATURATION: 98 % | TEMPERATURE: 99 F | BODY MASS INDEX: 32.56 KG/M2 | WEIGHT: 214.81 LBS | HEART RATE: 92 BPM | DIASTOLIC BLOOD PRESSURE: 80 MMHG

## 2019-11-04 DIAGNOSIS — L02.415 CELLULITIS AND ABSCESS OF RIGHT LEG: Primary | ICD-10-CM

## 2019-11-04 DIAGNOSIS — O24.111 TYPE 2 DIABETES MELLITUS AFFECTING PREGNANCY IN FIRST TRIMESTER, ANTEPARTUM: ICD-10-CM

## 2019-11-04 DIAGNOSIS — L03.115 CELLULITIS AND ABSCESS OF RIGHT LEG: Primary | ICD-10-CM

## 2019-11-04 PROCEDURE — 99999 PR PBB SHADOW E&M-EST. PATIENT-LVL III: ICD-10-PCS | Mod: PBBFAC,,, | Performed by: INTERNAL MEDICINE

## 2019-11-04 PROCEDURE — 99999 PR PBB SHADOW E&M-EST. PATIENT-LVL III: CPT | Mod: PBBFAC,,, | Performed by: INTERNAL MEDICINE

## 2019-11-04 PROCEDURE — 3008F BODY MASS INDEX DOCD: CPT | Mod: CPTII,S$GLB,, | Performed by: INTERNAL MEDICINE

## 2019-11-04 PROCEDURE — 99214 PR OFFICE/OUTPT VISIT, EST, LEVL IV, 30-39 MIN: ICD-10-PCS | Mod: S$GLB,,, | Performed by: INTERNAL MEDICINE

## 2019-11-04 PROCEDURE — 99214 OFFICE O/P EST MOD 30 MIN: CPT | Mod: S$GLB,,, | Performed by: INTERNAL MEDICINE

## 2019-11-04 PROCEDURE — 3008F PR BODY MASS INDEX (BMI) DOCUMENTED: ICD-10-PCS | Mod: CPTII,S$GLB,, | Performed by: INTERNAL MEDICINE

## 2019-11-04 RX ORDER — SULFAMETHOXAZOLE AND TRIMETHOPRIM 800; 160 MG/1; MG/1
1 TABLET ORAL 2 TIMES DAILY
Qty: 20 TABLET | Refills: 0 | Status: SHIPPED | OUTPATIENT
Start: 2019-11-04 | End: 2019-11-14

## 2019-11-04 RX ORDER — FLUCONAZOLE 150 MG/1
TABLET ORAL
Qty: 2 TABLET | Refills: 0 | Status: SHIPPED | OUTPATIENT
Start: 2019-11-04 | End: 2020-04-13

## 2019-11-04 RX ORDER — INSULIN ASPART 100 [IU]/ML
INJECTION, SOLUTION INTRAVENOUS; SUBCUTANEOUS DAILY PRN
COMMUNITY
End: 2020-05-20

## 2019-11-15 ENCOUNTER — PATIENT OUTREACH (OUTPATIENT)
Dept: ADMINISTRATIVE | Facility: HOSPITAL | Age: 39
End: 2019-11-15

## 2019-12-02 ENCOUNTER — PATIENT OUTREACH (OUTPATIENT)
Dept: ADMINISTRATIVE | Facility: HOSPITAL | Age: 39
End: 2019-12-02

## 2019-12-02 PROBLEM — E11.319: Status: ACTIVE | Noted: 2019-12-02

## 2019-12-23 ENCOUNTER — PATIENT MESSAGE (OUTPATIENT)
Dept: PODIATRY | Facility: CLINIC | Age: 39
End: 2019-12-23

## 2019-12-23 ENCOUNTER — OFFICE VISIT (OUTPATIENT)
Dept: PODIATRY | Facility: CLINIC | Age: 39
End: 2019-12-23
Payer: COMMERCIAL

## 2019-12-23 VITALS
SYSTOLIC BLOOD PRESSURE: 130 MMHG | WEIGHT: 214.75 LBS | HEIGHT: 68 IN | HEART RATE: 91 BPM | BODY MASS INDEX: 32.55 KG/M2 | DIASTOLIC BLOOD PRESSURE: 90 MMHG

## 2019-12-23 DIAGNOSIS — M20.41 HAMMER TOES OF BOTH FEET: ICD-10-CM

## 2019-12-23 DIAGNOSIS — E11.9 COMPREHENSIVE DIABETIC FOOT EXAMINATION, TYPE 2 DM, ENCOUNTER FOR: Primary | ICD-10-CM

## 2019-12-23 DIAGNOSIS — B35.3 TINEA PEDIS OF BOTH FEET: ICD-10-CM

## 2019-12-23 DIAGNOSIS — M72.2 PLANTAR FASCIITIS: ICD-10-CM

## 2019-12-23 DIAGNOSIS — L85.3 XEROSIS CUTIS: ICD-10-CM

## 2019-12-23 DIAGNOSIS — M20.5X2 HALLUX LIMITUS, ACQUIRED, LEFT: ICD-10-CM

## 2019-12-23 DIAGNOSIS — M79.671 FOOT PAIN, BILATERAL: ICD-10-CM

## 2019-12-23 DIAGNOSIS — M20.42 HAMMER TOES OF BOTH FEET: ICD-10-CM

## 2019-12-23 DIAGNOSIS — M20.5X1 HALLUX LIMITUS, ACQUIRED, RIGHT: ICD-10-CM

## 2019-12-23 DIAGNOSIS — M79.672 FOOT PAIN, BILATERAL: ICD-10-CM

## 2019-12-23 PROCEDURE — 99999 PR PBB SHADOW E&M-EST. PATIENT-LVL III: CPT | Mod: PBBFAC,,, | Performed by: PODIATRIST

## 2019-12-23 PROCEDURE — 99999 PR PBB SHADOW E&M-EST. PATIENT-LVL III: ICD-10-PCS | Mod: PBBFAC,,, | Performed by: PODIATRIST

## 2019-12-23 PROCEDURE — 99214 PR OFFICE/OUTPT VISIT, EST, LEVL IV, 30-39 MIN: ICD-10-PCS | Mod: 25,S$GLB,, | Performed by: PODIATRIST

## 2019-12-23 PROCEDURE — 20550 NJX 1 TENDON SHEATH/LIGAMENT: CPT | Mod: RT,S$GLB,, | Performed by: PODIATRIST

## 2019-12-23 PROCEDURE — 3008F PR BODY MASS INDEX (BMI) DOCUMENTED: ICD-10-PCS | Mod: CPTII,S$GLB,, | Performed by: PODIATRIST

## 2019-12-23 PROCEDURE — 99214 OFFICE O/P EST MOD 30 MIN: CPT | Mod: 25,S$GLB,, | Performed by: PODIATRIST

## 2019-12-23 PROCEDURE — 20550 PR INJECT TENDON SHEATH/LIGAMENT: ICD-10-PCS | Mod: 51,LT,S$GLB, | Performed by: PODIATRIST

## 2019-12-23 PROCEDURE — 3008F BODY MASS INDEX DOCD: CPT | Mod: CPTII,S$GLB,, | Performed by: PODIATRIST

## 2019-12-23 RX ORDER — CLOTRIMAZOLE AND BETAMETHASONE DIPROPIONATE 10; .64 MG/G; MG/G
CREAM TOPICAL 2 TIMES DAILY
Qty: 45 G | Refills: 4 | Status: SHIPPED | OUTPATIENT
Start: 2019-12-23 | End: 2021-01-16 | Stop reason: SDUPTHER

## 2019-12-23 RX ORDER — TRIAMCINOLONE ACETONIDE 40 MG/ML
20 INJECTION, SUSPENSION INTRA-ARTICULAR; INTRAMUSCULAR ONCE
Status: COMPLETED | OUTPATIENT
Start: 2019-12-23 | End: 2019-12-23

## 2019-12-23 RX ORDER — DEXAMETHASONE SODIUM PHOSPHATE 4 MG/ML
2 INJECTION, SOLUTION INTRA-ARTICULAR; INTRALESIONAL; INTRAMUSCULAR; INTRAVENOUS; SOFT TISSUE ONCE
Status: COMPLETED | OUTPATIENT
Start: 2019-12-23 | End: 2019-12-23

## 2019-12-23 RX ORDER — BUPIVACAINE HYDROCHLORIDE 5 MG/ML
1 INJECTION, SOLUTION EPIDURAL; INTRACAUDAL ONCE
Status: COMPLETED | OUTPATIENT
Start: 2019-12-23 | End: 2019-12-23

## 2019-12-23 RX ORDER — LIDOCAINE HYDROCHLORIDE 20 MG/ML
1 INJECTION, SOLUTION EPIDURAL; INFILTRATION; INTRACAUDAL; PERINEURAL ONCE
Status: COMPLETED | OUTPATIENT
Start: 2019-12-23 | End: 2019-12-23

## 2019-12-23 RX ADMIN — BUPIVACAINE HYDROCHLORIDE 5 MG: 5 INJECTION, SOLUTION EPIDURAL; INTRACAUDAL at 08:12

## 2019-12-23 RX ADMIN — TRIAMCINOLONE ACETONIDE 20 MG: 40 INJECTION, SUSPENSION INTRA-ARTICULAR; INTRAMUSCULAR at 08:12

## 2019-12-23 RX ADMIN — LIDOCAINE HYDROCHLORIDE 20 MG: 20 INJECTION, SOLUTION EPIDURAL; INFILTRATION; INTRACAUDAL; PERINEURAL at 08:12

## 2019-12-23 RX ADMIN — DEXAMETHASONE SODIUM PHOSPHATE 2 MG: 4 INJECTION, SOLUTION INTRA-ARTICULAR; INTRALESIONAL; INTRAMUSCULAR; INTRAVENOUS; SOFT TISSUE at 08:12

## 2019-12-23 NOTE — PROGRESS NOTES
Subjective:      Patient ID: Tonya Rivas is a 39 y.o. female.    Chief Complaint: Diabetes Mellitus (ov  Dair pcp- bilateral foot pain posterior feet rash- dry scaly skin) and Foot Pain    Tonya is a 39 y.o. female who presents to the clinic for evaluation and treatment of high risk feet. 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  She presents to the clinic complaining of bilateral plantar medial heel pain (right greater than left), especially with the first step in the morning. The pain is described as Aching, Burning and Throbbing. The onset of the pain was gradual and has worsened over the past several months. Tonya Rivas rates the pain as 8/10. she denies a history of trauma. she relates pain began following increased activity.  Patient relates she began to do exercise program in her home on the concrete floors in flexible [employer] tennis shoe.  Prior treatments include 800 mg ibuprofen twice daily with some relief.      2019 patient presents to clinic with persistent plantar fasciitis despite icing stretching and changing her shoes.  She relates that the Mobic did not provide relief.  She would like to discuss other options both surgical and conservative.  She also relates this new onset of dry scaly skin to the plantar foot that also causes cracking to the plantar aspect of hallux bilaterally. She states that on the right side she also gets some itching and redness to the dorsum of the foot. She has tried self-treatment with moisturizing lotion such as creve and gold bond without relief.  She relates that since she has not been able to exercise her blood sugars have been elevated.      Shoe gaar:  Tonya  Hours on Feet: 6+  Exercise: moderately active    This patient has documented high  risk feet requiring routine maintenance secondary to diabetes mellitis and those secondary complications of diabetes, as mentioned..    PCP: Hernando Duncan MD    Date Last Seen by PCP:   Chief Complaint   Patient presents with    Diabetes Mellitus     ov  Dair pcp- bilateral foot pain posterior feet rash- dry scaly skin    Foot Pain         Current shoe gear:  allegria    Hemoglobin A1C   Date Value Ref Range Status   2019 7.0 (H) 4.0 - 5.6 % Final     Comment:     ADA Screening Guidelines:  5.7-6.4%  Consistent with prediabetes  >or=6.5%  Consistent with diabetes  High levels of fetal hemoglobin interfere with the HbA1C  assay. Heterozygous hemoglobin variants (HbS, HgC, etc)do  not significantly interfere with this assay.   However, presence of multiple variants may affect accuracy.     2019 9.5 (H) 4.0 - 5.6 % Final     Comment:     ADA Screening Guidelines:  5.7-6.4%  Consistent with prediabetes  >or=6.5%  Consistent with diabetes  High levels of fetal hemoglobin interfere with the HbA1C  assay. Heterozygous hemoglobin variants (HbS, HgC, etc)do  not significantly interfere with this assay.   However, presence of multiple variants may affect accuracy.     2019 8.7 (H) 4.0 - 5.6 % Final     Comment:     ADA Screening Guidelines:  5.7-6.4%  Consistent with prediabetes  >or=6.5%  Consistent with diabetes  High levels of fetal hemoglobin interfere with the HbA1C  assay. Heterozygous hemoglobin variants (HbS, HgC, etc)do  not significantly interfere with this assay.   However, presence of multiple variants may affect accuracy.             Patient Active Problem List   Diagnosis    Uncontrolled type 2 diabetes mellitus with hyperglycemia started on insulin 2019    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    History of herpes simplex    Type 2 diabetes mellitus affecting pregnancy in first trimester, antepartum    Missed     S/P suction dilation and curettage     "Obstructive sleep apnea    Diabetic retinopathy of left eye associated with type 2 diabetes mellitus       Current Outpatient Medications on File Prior to Visit   Medication Sig Dispense Refill    aspirin 81 MG Chew Take 81 mg by mouth once daily.      augmented betamethasone dipropionate (DIPROLENE-AF) 0.05 % cream Apply to affected areas of fingers BID prn psoriasis. 50 g 1    BD ULTRA-FINE MINI PEN NEEDLE 31 gauge x 3/16" Ndle Use 8 needles per day 100 each 11    blood sugar diagnostic (ONETOUCH ULTRA BLUE TEST STRIP) Strp 1 strip by Misc.(Non-Drug; Combo Route) route 4 (four) times daily. 360 strip 3    exenatide microspheres (BYDUREON) 2 mg/0.65 mL PnIj Inject 2 mg into the skin every 7 days. 4 each 11    fluconazole (DIFLUCAN) 150 MG Tab TAKE ONE TABLET BY MOUTH ONCE AND MAY REPEAT IN 3 DAYS IF STILL SYMPTOMATIC 2 tablet 0    insulin aspart U-100 (NOVOLOG) 100 unit/mL injection Inject into the skin daily as needed. Sliding scale      insulin syringe-needle U-100 (BD LO-DOSE MICRO-FINE IV) 1/2 mL 28 gauge x 1/2" Syrg 1 each by Misc.(Non-Drug; Combo Route) route 4 (four) times daily. 100 each 1    insulin syringe-needle U-100 0.5 mL 31 gauge x 5/16" Syrg us as directed three times daily 100 each 0    meloxicam (MOBIC) 15 MG tablet Take 1 tablet (15 mg total) by mouth once daily. 1 pill per day everyday for the next 3 weeks with food 30 tablet 1    metFORMIN (GLUCOPHAGE) 1000 MG tablet Take 1 tablet (1,000 mg total) by mouth 2 (two) times daily with meals. 180 tablet 3    pen needle, diabetic 31 gauge x 1/4" Ndle Utilize 1 pen needle with each injection: 4 - 6 daily 100 each 11     No current facility-administered medications on file prior to visit.        Review of patient's allergies indicates:  No Known Allergies    Past Surgical History:   Procedure Laterality Date     SECTION      x3    DILATION AND CURETTAGE OF UTERUS USING SUCTION N/A 2019    Procedure: DILATION AND CURETTAGE, " UTERUS, USING SUCTION;  Surgeon: Tonya Cavanaugh MD;  Location: [facility];  Service: OB/GYN;  Laterality: N/A;    EXTRACORPOREAL SHOCK WAVE LITHOTRIPSY      LAPAROSCOPIC GASTRIC BANDING  2005       Family History   Problem Relation Age of Onset    Cancer Mother     Learning disabilities Son     Heart disease Maternal Grandfather     Breast cancer Neg Hx     Colon cancer Neg Hx     Ovarian cancer Neg Hx        Social History     Socioeconomic History    Marital status:      Spouse name: Not on file    Number of children: Not on file    Years of education: Not on file    Highest education level: Not on file   Occupational History    Occupation: RN     Comment: Dialysis   Social Needs    Financial resource strain: Not hard at all    Food insecurity:     Worry: Not on file     Inability: Not on file    Transportation needs:     Medical: Not on file     Non-medical: Not on file   Tobacco Use    Smoking status: Never Smoker    Smokeless tobacco: Never Used   Substance and Sexual Activity    Alcohol use: No     Frequency: Monthly or less     Drinks per session: 1 or 2     Binge frequency: Never     Comment: social    Drug use: No    Sexual activity: Yes     Partners: Male     Birth control/protection: None   Lifestyle    Physical activity:     Days per week: 3 days     Minutes per session: 30 min    Stress: Rather much   Relationships    Social connections:     Talks on phone: Twice a week     Gets together: Twice a week     Attends Mandaen service: Not on file     Active member of club or organization: No     Attends meetings of clubs or organizations: Patient refused     Relationship status:    Other Topics Concern    Are you pregnant or think you may be? No    Breast-feeding No   Social History Narrative    Not on file       Review of Systems   Constitution: Negative for chills and fever.   Cardiovascular: Negative for claudication and leg swelling.   Respiratory: Negative for  "cough and shortness of breath.    Skin: Positive for dry skin. Negative for itching, nail changes and rash.   Musculoskeletal: Positive for joint pain and myalgias. Negative for falls, joint swelling and muscle weakness.   Gastrointestinal: Negative for diarrhea, nausea and vomiting.   Neurological: Positive for paresthesias. Negative for numbness, tremors and weakness.   Psychiatric/Behavioral: Negative for altered mental status and hallucinations.           Objective:      Vitals:    12/23/19 0710   BP: (!) 130/90   Pulse: 91   Weight: 97.4 kg (214 lb 11.7 oz)   Height: 5' 8" (1.727 m)   PainSc:   6       Physical Exam   Constitutional:  Non-toxic appearance. She does not have a sickly appearance. No distress.   Cardiovascular:   Pulses:       Dorsalis pedis pulses are 2+ on the right side, and 2+ on the left side.        Posterior tibial pulses are 2+ on the right side, and 2+ on the left side.   Pulmonary/Chest: No respiratory distress.   Musculoskeletal:        Right ankle: She exhibits decreased range of motion. She exhibits no swelling. No tenderness. No lateral malleolus, no medial malleolus, no AITFL, no CF ligament and no posterior TFL tenderness found. Achilles tendon exhibits no pain, no defect and normal Chapa's test results.        Left ankle: She exhibits decreased range of motion. She exhibits no swelling. No tenderness. No lateral malleolus, no medial malleolus, no AITFL, no CF ligament and no posterior TFL tenderness found. Achilles tendon exhibits no pain, no defect and normal Chapa's test results.        Right foot: There is no bony tenderness.        Left foot: There is no bony tenderness.   Biomechanical exam: Pain on palpation bilateral medial calcaneal tubercle at origin of plantar fascia. There is equinus deformity bilateral with decreased dorsiflexion at the ankle joint bilateral. No tenderness with compression of heel. Negative tinels sign. Gait analysis reveals excessive pronation " through midstance and propulsion with early heel off. Shoes reveals lateral heel counter wear bilateral     Decreased first MPJ range of motion both weightbearing and nonweightbearing, no crepitus observed the first MP joint, + dorsal flag sign. Mild  bunion deformity is observed .    Patient has hammertoes of digits 2-5 bilateral partially reducible      Neurological: She has normal reflexes. She displays no atrophy and no tremor. No sensory deficit. She exhibits normal muscle tone.   Skin: Skin is warm and dry. Laceration (Plantar hallux MTPJ bilaterally with thin epithelial skin) and rash noted. No bruising, no burn and no lesion noted. She is not diaphoretic. No cyanosis. No pallor. Nails show no clubbing.   Scaling dryness in a moccasin distribution is noted to the bilateral lower extremities with associated erythema.       Psychiatric: Her mood appears not anxious. Her affect is not inappropriate. Her speech is not slurred. She is not combative. She is communicative. She is attentive.   Nursing note reviewed.            Assessment:       Encounter Diagnoses   Name Primary?    Comprehensive diabetic foot examination, type 2 DM, encounter for Yes    Foot pain, bilateral     Plantar fasciitis     Hammer toes of both feet     Hallux limitus, acquired, left     Hallux limitus, acquired, right     Xerosis cutis     Tinea pedis of both feet          Plan:     Problem List Items Addressed This Visit     None      Visit Diagnoses     Comprehensive diabetic foot examination, type 2 DM, encounter for    -  Primary    Relevant Orders    X-Ray Foot Complete Bilateral    Foot pain, bilateral        Relevant Orders    X-Ray Foot Complete Bilateral    ORTHOTIC DEVICE (DME)    Plantar fasciitis        Relevant Orders    X-Ray Foot Complete Bilateral    ORTHOTIC DEVICE (DME)    Hammer toes of both feet        Relevant Orders    X-Ray Foot Complete Bilateral    ORTHOTIC DEVICE (DME)    Hallux limitus, acquired, left         Relevant Orders    X-Ray Foot Complete Bilateral    ORTHOTIC DEVICE (DME)    Hallux limitus, acquired, right        Relevant Orders    X-Ray Foot Complete Bilateral    ORTHOTIC DEVICE (DME)    Xerosis cutis        Tinea pedis of both feet             I counseled the patient on her conditions, their implications and medical management.    Orders Placed This Encounter    ORTHOTIC DEVICE (DME)    X-Ray Foot Complete Bilateral    bupivacaine (PF) 0.5% (5 mg/mL) injection 5 mg    dexamethasone injection 2 mg    lidocaine (PF) 20 mg/ml (2%) injection 20 mg    triamcinolone acetonide injection 20 mg    clotrimazole-betamethasone 1-0.05% (LOTRISONE) cream       Greater than 50% of this visit spent on counseling and coordination of care.    Education about the diabetic foot, neuropathy, and prevention of limb loss.    Shoe inspection. Diabetic Foot Education. Patient reminded of the importance of good nutrition/healthy diet/weight management and blood sugar control to help prevent podiatric complications of diabetes. Patient instructed on proper foot hygeine. Wear comfortable, proper fitting shoes. Wash feet daily. Dry well. After drying, apply moisturizer to feet (no lotion to webspaces). Inspect feet daily for skin breaks, blisters, swelling, or redness. Wear cotton socks (preferably white)  Change socks every day. Do NOT walk barefoot. Do NOT use heating pads or hot water soaks. We discussed wearing proper shoe gear, daily foot inspections, never walking without protective shoe gear.     Discussed different treatment options for heel pain. I gave written and verbal instructions on stretching exercises. Patient expressed understanding. Discussed icing the affected area as needed and also wearing appropriate shoe gear and avoiding flats, slippers, sandals, and going barefoot. My recommendation for OTC supports is [facility].  Prescription for orthotics dispensed.  Patient instructed on adequate icing  techniques. Patient should ice the affected area at least once per day x 10 minutes for 10 days . I advised the patient that extra icing would also be beneficial to ensure adequate anti inflammatory effect. We also discussed cortisone injections and NSAID therapy.     X-rays ordered to evaluate any changes in anatomy    Discussed in detail how uncontrolled diabetes will contribute to sluggish healing.  Patient requests steroid injection.  Informed her that this injection may also increase her blood glucose.    Patient would like injection today. Skin was prepped with alcohol and anesthetized with ethyl chloride.  The following mixture was injected into each he medial heel,   plantar approach: 3ccs of mixture of (1cc 1% plain Lidocaine : 1cc 0.5% Marcaine plain:  0.5cc kenalog-40 : 0.5cc dexamethasone) directly into problematic areas of each foot.  Patient  tolerated the injection well. Related relief following injection.     Instructed patient on the importance of keeping feet dry. Patient instructed to use absorbent cotton socks and change them if they become sweaty; or wear an open-toe shoe or sandal. Wash the feet at least once a day with soap and water. Apply the antifungal gel as prescribed. Instructed patient that it takes time for symptoms to completely dissipate. Patient instructed to use lysol or over-the-counter antifungal powders or sprays to shoes daily and allow them to air dry, switching shoes from every other day would be optimal. Patient is to avoid barefoot walking in  high-risk environments (public showers, gyms and locker rooms) may prevent future infections.     Patient to RTC if:  Increasing redness or swelling of the foot   Pus draining from cracks in the skin   Fever of 100.4ºF (38ºC) or higher    RTC in 7-10 weeks if no improvement, at this time she will receive MRI in preparation for surgical consult. Patient is amenable to plan.

## 2019-12-23 NOTE — PATIENT INSTRUCTIONS
Recommend lotions: eucerin, eucerin for diabetics, aquaphor, A&D ointment, gold bond for diabetics, sween, [place]'s Bees all purpose baby ointment,  urea 40 with aloe (found on amazon.com)    Shoe recommendations: (try 6pm.com, zappos.Skytide , nordstromrack.Skytide, or shoes.Skytide for discounted prices) you can visit [employer] shoes in [place]  or [place] in the [facility] (there are also several shoe brand outlets in the [facility])    Asics (GT 2000 or gel foundations), new balance stability type shoes, saucony (stabil c3),  Euceda (GTS or Beast or transcend), propet (tennis shoe)    Sofft [place] (women) Arias&Carlos (men), clarks, crocs, aerosoles, naturalizers, SAS, ecco, born, rolly sepulveda, rockports (dress shoes)    Vionic, burkenstocks, fitflops, propet (sandals)  Nike comfort thong sandals, crocs, propet (house shoes)    Nail Home remedy:  Vicks Vapor rub to nails for easier manageability    Occasional soaks for 15-20 mins in luke warm water with 1/2 cup of listerine and 1 cup of apple cider vinegar are ok You may add several drops of oil of oregano or tea tree oil as well      Understanding Plantar Fasciitis    Plantar fasciitis is a condition that causes foot and heel pain. The plantar fascia is a tough band of tissue that runs across the bottom of the foot from the heel to the toes. This tissue pulls on the heel bone. It supports the arch of the foot as it pushes off the ground. If the tissue becomes irritated or red and swollen (inflamed), it is called plantar fasciitis.  How to say it  PLAN-tuhr fa-see-IY-tis   What causes plantar fasciitis?  Plantar fasciitis most often occurs from overusing the plantar fascia. The tissue may become damaged from activities that put repeated stress on the heel and foot. Or it may wear down over time with age and ankle stiffness. You are more likely to have plantar fasciitis if you:  · Do activities that require a lot of running, jumping, or dancing  · Have a job that requires being  on your feet for long periods  · Are overweight or obese  · Have certain foot problems, such as a tight Achilles tendon, flat feet, or high arches  · Often wear poorly fitting shoes  Symptoms of plantar fasciitis  The condition most often causes pain in the heel and the bottom of the foot. The pain may occur when you take your first steps in the morning. It may get better as you walk throughout the day. But as you continue to put weight on the foot, the pain often returns. Pain may also occur after standing or sitting for long periods.  Treating plantar fasciitis  Treatments for plantar fasciitis include:  · Resting the foot. This involves limiting movements that make your foot hurt. You may also need to avoid certain sports and types of work for a time.  · Using cold packs. Put an ice pack on the heel and foot to help reduce pain and swelling.  · Taking pain medicines. Prescription and over-the-counter pain medicines can help relieve pain and swelling.  · Using heel cups or foot inserts (orthotics). These are placed in the shoes to help support the heel or arch and cushion the heel. You may also be told to buy proper-fitting shoes with good arch support and cushioned soles.  · Taping the foot. This supports the arch and limits the movement of the plantar fascia to help relieve symptoms.  · Wearing a night splint. This stretches the plantar fascia and leg muscles while you sleep. This may help relieve pain.  · Doing exercises and physical therapy. These stretch and strengthen the plantar fascia and the muscles in the leg that support the heel and foot.  · Getting shots of medicine into the foot. These may help relieve symptoms for a time.  · Having surgery. This may be needed if other treatments fail to relieve symptoms. During surgery, the surgeon may partially cut the plantar fascia to release tension.  Possible complications of plantar fasciitis  Without proper care and treatment, healing may take longer than  normal. Also, symptoms may continue or get worse. Over time, the plantar fascia may be damaged. This can make it hard to walk or even stand without pain.  When to call your healthcare provider  Call your healthcare provider right away if you have any of these:  · Fever of 100.4°F (38°C) or higher, or as directed  · Symptoms that dont get better with treatment, or get worse  · New symptoms, such as numbness, tingling, or weakness in the foot   © 8895-1394 [employer]. [address] [place] [address]. All rights reserved. This information is not intended as a substitute for professional medical care. Always follow your healthcare professional's instructions.        Treating Plantar Fasciitis  First, your healthcare provider tries to determine the cause of your problem in order to suggest ways to relieve pain. If your pain is due to poor foot mechanics, custom-made shoe inserts (orthoses) may help.    Reduce symptoms  · To relieve mild symptoms, try aspirin, ibuprofen, or other medicines as directed. Rubbing ice on the affected area may also help.  · To reduce severe pain and swelling, your healthcare provider may prescribe pills or injections or a walking cast in some instances. Physical therapy, such as ultrasound or a daily stretching program, may also be recommended. Surgery is rarely required.  · To reduce symptoms caused by poor foot mechanics, your foot may be taped. This supports the arch and temporarily controls movement. Night splints may also help by stretching the fascia.  Control movement  If taping helps, your healthcare provider may prescribe orthoses. Built from plaster casts of your feet, these inserts control the way your foot moves. As a result, your symptoms should go away.  Reduce overuse  Every time your foot strikes the ground, the plantar fascia is stretched. You can reduce the strain on the plantar fascia and the possibility of overuse by following these  suggestions:  · Lose any excess weight.  · Avoid running on hard or uneven ground.  · Use orthoses at all times in your shoes and house slippers.  If surgery is needed  Your healthcare provider may consider surgery if other types of treatment don't control your pain. During surgery, the plantar fascia is partially cut to release tension. As you heal, fibrous tissue fills the space between the heel bone and the plantar fascia.   © 7470-6533 [employer]. [address] [place] [address], [place] [address]. All rights reserved. This information is not intended as a substitute for professional medical care. Always follow your healthcare professional's instructions.        Wearing Proper Shoes                    You walk on your feet every day, forcing them to support the weight of your body. Repeated stress on your feet can cause damage over time. The right shoes can help protect your feet. The wrong shoes can cause more foot problems. Read the information below to help you find a shoe that fits your foot needs.     A good shoe fit will cover your foot outline.       A shoe that doesnt cover the outline is a bad fit.      Whats Your Foot Shape?  To get a good fit, you need to know the shape of your foot. Do this simple test: While standing, place your foot on a piece of paper and trace around it. Is your foot straight or curved? Do you have a foot problem, such as a bunion, that causes your foot outline to show a bulge on the side of your big toe?  Finding Your Fit  Bring your foot outline to the Handle store to help you find the right shoe. Place a shoe you like on top of the outline to see if it matches the shape. The shoe should cover the outline. (If you have a bunion, the shoe may not cover the bulge on the outline. Look for soft leather shoes to stretch over the bunion.) Once youve found a pair of proper shoes, put them on. Walk around. Be sure the shoes dont rub or pinch. If the shoes feel good, youve  found your fit!  The Right Shoe for You  A good shoe has features that provide comfort and support. It must also be the right size and shape for your feet. Look for a shoe made of breathable fabric and lining, such as leather or canvas. Be sure that shoes have enough tread to prevent slipping. Go to a good shoe store for help finding the right shoe.  Good Shoe Features  An ideal shoe has the following:  · Laces for support. If tying laces is a problem for you, try shoes with Velcro fasteners or halley.  · A front of the shoe (toe box) with ½ inch space in front of your longest toes.  · An arch shape that supports your foot.  · No more than 1½ inches of heel.  · A stiff, snug back of the shoe to keep your foot from sliding around.  · A smooth lining with no rough seams.  Shoe Shopping Tips  Below are some dos and donts for when you go to the shoe store.  Do:  · Select the shoes that feel right. Wear them around the house. Then bring them to your foot doctor to check for fit. If they dont fit well, return them.  · Shop late in the day, when your feet will be slightly bigger.  · Each time you buy shoes, have both your feet measured while you are standing. Foot size changes with time.  · Pick shoes to suit their purpose. High heels are okay for an occasional night on the town. But for everyday wear, choose a more sensible shoe.  · Try on shoes while wearing any inserts specially made for your feet (orthoses).  · Try on both the right and left shoes. If your feet are different sizes, pick a pair that fits the larger foot.  Dont:  · Dont buy shoes based on shoe size alone. Always try on shoes, as sizes differ from brand to brand and within brands.  · Dont expect shoes to break in. If they dont fit at the store, dont buy them.  · Dont buy a shoe that doesnt match your foot shape.  What About Socks?  Always wear socks with shoes. Socks help absorb sweat and reduce friction and blistering. When shopping for  shoes, choose soft, padded socks with seams that dont irritate your feet.  If You Have Foot Problems  Some foot problems cause deformities. This can make it hard to find a good fit. Look for shoes made of soft leather to stretch over the deformity. If you have bunions, buy shoes with a wider toe box. To fit hammertoes, look for shoes with a tall toe box. If you have arch problems, you may need inserts. In some cases, youll need to have custom footwear or orthoses made for your feet.  Suggested Footwear  Ask your healthcare provider what kind of footwear you need. He or she may recommend a certain brand or shoe store.  © 9478-2347 [employer]. [address] [place] [address]. All rights reserved. This information is not intended as a substitute for professional medical care. Always follow your healthcare professional's instructions.        Toe Extension (Flexibility)    These instructions are for your right foot. Switch sides for your left foot.  1. Sit in a chair. Rest your right ankle on your left knee.  2. Hold your toes with your right hand. Gently bend the toes backward. Feel a stretch in the undersides of the toes and ball of the foot. Hold for 30 to 60 seconds.  3. Then gently bend the toes in the other direction. Gently press on them until your foot is pointed. Hold for 30 to 60 seconds.  4. Repeat 5 times, or as instructed.  © 8489-5022 [employer]. [address] [place] [address]. All rights reserved. This information is not intended as a substitute for professional medical care. Always follow your healthcare professional's instructions.        Recommend lotions: eucerin, eucerin for diabetics, aquaphor, A&D ointment, gold bond for diabetics, sween, [place]'s Bees all purpose baby ointment,  urea 40 with aloe (found on amazon.com)    Shoe recommendations: (try 6pm.com, zappos.com , nordstromrack.com, or shoes.com for discounted prices) you can visit [employer] shoes  in [place]  or [place] in the [facility] (there are also several shoe brand outlets in the [facility])    Asics (GT 2000 or gel foundations), new balance stability type shoes (such as the 940 series), saucony (stabil c3),  Euceda (GTS or Beast or transcend), propet (tennis shoe)    Sofft Brand (women) Arias&Carlos (men), clarks, crocs, aerosoles, naturalizers, SAS, ecco, born, rolly sepulveda, rockports (dress shoes)    Vionic, burkenstocks, fitflops, propet (sandals)  Nike comfort thong sandals, crocs, propet (house shoes)    Nail Home remedy:  Vicks Vapor rub to nails for easier manageability      Occasional soaks for 15-20 mins in luke warm water with 1/2 cup of listerine and 1 cup of apple cider vinegar are ok You may add several drops of oil of oregano or tea tree oil as well        What Is Arthritis in the Foot?  Degenerative arthritis is a condition that slowly wears away joints, the area where bones meet and move. In the beginning, you may notice that the affected joint seems stiff. It may even ache. As the joint lining (cartilage) breaks down, the bones rub against each other, causing pain and swelling. Over time, small pieces of rough or splintered bone (bone spurs) develop, and the joints range of motion becomes limited. But movement doesnt have to cause pain. The effects of arthritis can be reduced.    The big-toe joint  When arthritis affects your big toe, your foot hurts when it pushes off the ground. Arthritis often appears in the big-toe joint along with a bunion (a bony bump at the side of the joint) or a bone spur on top of the joint.    Other joints  When arthritis affects the rear or midfoot joints, you feel pain when you put weight on your foot. Arthritis may affect the joint where the ankle and foot meet. It may also affect other joints nearby.  Date Last Reviewed: 7/1/2016  © 8970-9288 [employer]. [address] [place] [address], [place] [address]. All rights reserved. This  information is not intended as a substitute for professional medical care. Always follow your healthcare professional's instructions.        Treating Arthritis in the Foot  If your symptoms are mild, medications may be enough to reduce pain and swelling. For more severe arthritis, surgery may be needed to improve the condition of the joint.    Medicine  Your doctor may prescribe medicine--pills or injections--to limit pain and swelling. Ice, aspirin, acetaminophen, or ibuprofen may help relieve mild symptoms that occur after activity.  Surgery and bone trimming  To ease movement and reduce pain, your doctor may trim damaged bone. If arthritis is severe, the joint may be fused or removed. If the bone is not damaged too badly, your doctor may simply shave away bone spurs. Any excess bone growth related to a bunion may also be trimmed.  Fusing joints  If damage is more severe, your doctor may fuse the joint to prevent the bones from rubbing. Afterward, staples, plates, or screws may hold the bones in place so they heal properly. In some cases, the joint may be removed and replaced with an implant.  After surgery  During the early stages of recovery, your foot is likely to be bandaged and immobilized for a while. For best results, follow up with your doctor as scheduled. These visits help ensure that your foot heals properly.  As you heal  After surgery, youll be told how to care for your incision and how soon to begin walking on the foot. Until the foot can bear weight, you may need to walk with crutches or a cane.  For surgery on the big toe, your foot may be splinted to limit movement for several weeks. Despite this, you should be able to walk soon after surgery.  For surgery on rear or midfoot joints, you may need to wear a cast or surgical shoe. These joints are fairly large, so full recovery may take a few months. Once the bone has healed, any staples, plates, or screws may be removed.  Date Last Reviewed:  7/1/2016  © 6941-5309 [employer], [employer]. [address] [place] [address], [place] [address]. All rights reserved. This information is not intended as a substitute for professional medical care. Always follow your healthcare professional's instructions.        Foot Surgery: Degenerative Joint Disease    Degenerative joint disease (arthritis) often happens in the joint of a big toe. This bone growth may cause pain and stiffness in the joint. Left untreated, arthritis can break down the cartilage and destroy the joint. Your treatment choices depend on how damaged your joint is. There are many nonsurgical treatments, but if these are not helpful, surgery may be considered.    Cheilectomy  This is done when the arthritic joint and cartilage can be saved. A bone spur caused by arthritis may be symptomatic on the top of the big toe joint. The procedure involves removing this bone spur, usually with a small part of the top of the joint itself.  You will need to wear a surgical shoe for several weeks. Once the foot heals, joint movement is restored.    Fusion  In fusion, the cartilage and some bone on both sides of the joint are removed. Then, the big toe and metatarsal bones are held together with staples, screws, or a plate and screws. Your foot may be placed in a cast. While you heal, you will be asked not to bear weight on this foot. You may also need crutches for several weeks. Because the joint has been removed, your toe will be less flexible.    Arthroplasty  During surgery, bone growth caused by the arthritis is trimmed, and part of the joint is removed. A pin can be used to align the bones and to keep them from touching. The pin is removed after several weeks. In some cases, the entire joint may be replaced with an implant. You may have to wear a splint or a surgical shoe for several weeks. When healed, the bones become connected with scar tissue.  Date Last Reviewed: 10/15/2015  © 2171-3397 [employer],  [employer]. [address] [place] [address]. All rights reserved. This information is not intended as a substitute for professional medical care. Always follow your healthcare professional's instructions.        Athletes Foot     Athletes Foot is caused by a fungal infection in the skin. It affects the skin between the toes where it causes fissures (cracks in the skin). It can also affect the bottom of the foot where it causes dry white scales and peeling of the skin. This infection is more likely to occur when the foot is in hot, sweaty socks and shoes for long periods of time.   This infection is treated with skin creams or oral medicine.     Home Care:   It is important to keep the feet dry. Use absorbent cotton socks and change them if they become sweaty; or wear an open-toe shoe or sandal. Wash the feet at least once a day with soap and water.   Rotate your shoes. If you must wear the same shoes everyday then spray the shoes with lysol or antifungal spray and allow that to dry overnight before wearing the shoes again  Apply the antifungal cream as prescribed. Some antifungal creams are available without a prescription (Lotrimin, Tinactin).   It may take a week before the rash starts to improve and it can take about three to four weeks to completely clear. Continue the medicine until the rash is all gone.   Use over-the-counter antifungal powders or sprays on your feet after exposure to high-risk environments (public showers, gyms and locker rooms) may prevent future infections. You may wish to use appropriate footwear to reduce exposure.  Clean tubs and bathroom floor with bleach  Clean feet with Nizoral shampoo or dial antibacterial soap and then dry completely.    Follow Up   with your doctor as recommended by our staff if the rash is not starting to improve after TEN days of treatment, or if the rash continues to spread.     Get Prompt Medical Attention   if any of the following occur:   Increasing  redness or swelling of the foot   Pus draining from cracks in the skin   Fever of 100.4ºF (38ºC) or higher, or as directed by your healthcare provider    © 8211-2938 [employer], [address] [place] [address], [place] [address]. All rights reserved. This information is not intended as a substitute for professional medical care. Always follow your healthcare professional's instructions.

## 2020-01-01 ENCOUNTER — PATIENT MESSAGE (OUTPATIENT)
Dept: FAMILY MEDICINE | Facility: CLINIC | Age: 40
End: 2020-01-01

## 2020-01-13 ENCOUNTER — PATIENT MESSAGE (OUTPATIENT)
Dept: FAMILY MEDICINE | Facility: CLINIC | Age: 40
End: 2020-01-13

## 2020-02-03 ENCOUNTER — PATIENT MESSAGE (OUTPATIENT)
Dept: FAMILY MEDICINE | Facility: CLINIC | Age: 40
End: 2020-02-03

## 2020-02-03 ENCOUNTER — TELEPHONE (OUTPATIENT)
Dept: FAMILY MEDICINE | Facility: CLINIC | Age: 40
End: 2020-02-03

## 2020-02-03 NOTE — TELEPHONE ENCOUNTER
Received a fax from pharmacy that Trulicity no longer covered.  However I have listed that she should be taking Bydureon.  I have asked her to clarify.  She is followed by outside endocrinology and may need endocrinology to adjust her GLP1 therapy

## 2020-02-05 ENCOUNTER — PATIENT MESSAGE (OUTPATIENT)
Dept: FAMILY MEDICINE | Facility: CLINIC | Age: 40
End: 2020-02-05

## 2020-02-05 DIAGNOSIS — E11.65 UNCONTROLLED TYPE 2 DIABETES MELLITUS WITH HYPERGLYCEMIA: ICD-10-CM

## 2020-02-05 DIAGNOSIS — O24.111 TYPE 2 DIABETES MELLITUS AFFECTING PREGNANCY IN FIRST TRIMESTER, ANTEPARTUM: Primary | ICD-10-CM

## 2020-02-05 NOTE — TELEPHONE ENCOUNTER
History Bydureon with side effects.  Trulicity without side effect.  Will need to get prior authorization.  I will send this [facility].

## 2020-02-10 ENCOUNTER — TELEPHONE (OUTPATIENT)
Dept: PHARMACY | Facility: CLINIC | Age: 40
End: 2020-02-10

## 2020-02-12 ENCOUNTER — TELEPHONE (OUTPATIENT)
Dept: PHARMACY | Facility: CLINIC | Age: 40
End: 2020-02-12

## 2020-02-12 DIAGNOSIS — O24.111 TYPE 2 DIABETES MELLITUS AFFECTING PREGNANCY IN FIRST TRIMESTER, ANTEPARTUM: Primary | ICD-10-CM

## 2020-02-12 DIAGNOSIS — E11.65 UNCONTROLLED TYPE 2 DIABETES MELLITUS WITH HYPERGLYCEMIA: ICD-10-CM

## 2020-02-13 ENCOUNTER — PATIENT MESSAGE (OUTPATIENT)
Dept: FAMILY MEDICINE | Facility: CLINIC | Age: 40
End: 2020-02-13

## 2020-02-13 NOTE — TELEPHONE ENCOUNTER
Trulicity not covered  History Bydureon with side effects  Ozempic ordered.  Starting dose 0.25 weekly for 4 weeks than 0.5 weekly for 4 weeks and then maintenance 1 mg.

## 2020-02-15 ENCOUNTER — PATIENT OUTREACH (OUTPATIENT)
Dept: ADMINISTRATIVE | Facility: OTHER | Age: 40
End: 2020-02-15

## 2020-03-11 ENCOUNTER — PATIENT OUTREACH (OUTPATIENT)
Dept: ADMINISTRATIVE | Facility: HOSPITAL | Age: 40
End: 2020-03-11

## 2020-03-16 ENCOUNTER — PATIENT OUTREACH (OUTPATIENT)
Dept: ADMINISTRATIVE | Facility: HOSPITAL | Age: 40
End: 2020-03-16

## 2020-03-25 ENCOUNTER — PATIENT OUTREACH (OUTPATIENT)
Dept: ADMINISTRATIVE | Facility: HOSPITAL | Age: 40
End: 2020-03-25

## 2020-03-26 NOTE — PROGRESS NOTES
Immunizations reviewed. [facility] reviewed. Care Everywhere reviewed. [facility] reviewed. Portal message sent to patient. Chart review completed.

## 2020-04-10 DIAGNOSIS — Z87.42 HISTORY OF RECURRENT VAGINAL DISCHARGE: Primary | ICD-10-CM

## 2020-04-10 DIAGNOSIS — L03.115 CELLULITIS AND ABSCESS OF RIGHT LEG: ICD-10-CM

## 2020-04-10 DIAGNOSIS — L02.415 CELLULITIS AND ABSCESS OF RIGHT LEG: ICD-10-CM

## 2020-04-13 RX ORDER — FLUCONAZOLE 150 MG/1
TABLET ORAL
Qty: 2 TABLET | Refills: 0 | Status: SHIPPED | OUTPATIENT
Start: 2020-04-13 | End: 2020-11-11 | Stop reason: SDUPTHER

## 2020-05-20 ENCOUNTER — TELEPHONE (OUTPATIENT)
Dept: FAMILY MEDICINE | Facility: CLINIC | Age: 40
End: 2020-05-20

## 2020-05-20 ENCOUNTER — OFFICE VISIT (OUTPATIENT)
Dept: FAMILY MEDICINE | Facility: CLINIC | Age: 40
End: 2020-05-20
Payer: COMMERCIAL

## 2020-05-20 ENCOUNTER — HOSPITAL ENCOUNTER (OUTPATIENT)
Dept: RADIOLOGY | Facility: HOSPITAL | Age: 40
Discharge: HOME | End: 2020-05-20
Attending: PHYSICIAN ASSISTANT
Payer: COMMERCIAL

## 2020-05-20 VITALS
DIASTOLIC BLOOD PRESSURE: 84 MMHG | TEMPERATURE: 98 F | WEIGHT: 218.06 LBS | SYSTOLIC BLOOD PRESSURE: 120 MMHG | OXYGEN SATURATION: 98 % | HEIGHT: 68 IN | HEART RATE: 97 BPM | BODY MASS INDEX: 33.05 KG/M2 | RESPIRATION RATE: 17 BRPM

## 2020-05-20 DIAGNOSIS — M79.89 LEFT LEG SWELLING: ICD-10-CM

## 2020-05-20 DIAGNOSIS — E55.9 VITAMIN D DEFICIENCY: ICD-10-CM

## 2020-05-20 DIAGNOSIS — L03.115 CELLULITIS OF RIGHT LEG: Primary | ICD-10-CM

## 2020-05-20 DIAGNOSIS — E11.9 CONTROLLED TYPE 2 DIABETES MELLITUS WITHOUT COMPLICATION, WITHOUT LONG-TERM CURRENT USE OF INSULIN: ICD-10-CM

## 2020-05-20 DIAGNOSIS — M79.89 LEFT LEG SWELLING: Primary | ICD-10-CM

## 2020-05-20 PROCEDURE — 3008F BODY MASS INDEX DOCD: CPT | Mod: CPTII,S$GLB,, | Performed by: PHYSICIAN ASSISTANT

## 2020-05-20 PROCEDURE — 3051F PR MOST RECENT HEMOGLOBIN A1C LEVEL 7.0 - < 8.0%: ICD-10-PCS | Mod: CPTII,S$GLB,, | Performed by: PHYSICIAN ASSISTANT

## 2020-05-20 PROCEDURE — 99214 PR OFFICE/OUTPT VISIT, EST, LEVL IV, 30-39 MIN: ICD-10-PCS | Mod: S$GLB,,, | Performed by: PHYSICIAN ASSISTANT

## 2020-05-20 PROCEDURE — 93971 EXTREMITY STUDY: CPT | Mod: 26,RT,, | Performed by: RADIOLOGY

## 2020-05-20 PROCEDURE — 99214 OFFICE O/P EST MOD 30 MIN: CPT | Mod: S$GLB,,, | Performed by: PHYSICIAN ASSISTANT

## 2020-05-20 PROCEDURE — 93971 EXTREMITY STUDY: CPT | Mod: TC,RT

## 2020-05-20 PROCEDURE — 3051F HG A1C>EQUAL 7.0%<8.0%: CPT | Mod: CPTII,S$GLB,, | Performed by: PHYSICIAN ASSISTANT

## 2020-05-20 PROCEDURE — 99999 PR PBB SHADOW E&M-EST. PATIENT-LVL IV: ICD-10-PCS | Mod: PBBFAC,,, | Performed by: PHYSICIAN ASSISTANT

## 2020-05-20 PROCEDURE — 3008F PR BODY MASS INDEX (BMI) DOCUMENTED: ICD-10-PCS | Mod: CPTII,S$GLB,, | Performed by: PHYSICIAN ASSISTANT

## 2020-05-20 PROCEDURE — 99999 PR PBB SHADOW E&M-EST. PATIENT-LVL IV: CPT | Mod: PBBFAC,,, | Performed by: PHYSICIAN ASSISTANT

## 2020-05-20 PROCEDURE — 93971 US LOWER EXTREMITY VEINS RIGHT: ICD-10-PCS | Mod: 26,RT,, | Performed by: RADIOLOGY

## 2020-05-20 RX ORDER — DULAGLUTIDE 1.5 MG/.5ML
INJECTION, SOLUTION SUBCUTANEOUS
COMMUNITY
Start: 2020-05-13 | End: 2020-11-27 | Stop reason: SDUPTHER

## 2020-05-20 RX ORDER — EMPAGLIFLOZIN 10 MG/1
TABLET, FILM COATED ORAL
COMMUNITY
Start: 2020-04-01 | End: 2021-01-16 | Stop reason: SDUPTHER

## 2020-05-20 RX ORDER — SULFAMETHOXAZOLE AND TRIMETHOPRIM 800; 160 MG/1; MG/1
1 TABLET ORAL 2 TIMES DAILY
Qty: 14 TABLET | Refills: 0 | Status: SHIPPED | OUTPATIENT
Start: 2020-05-20 | End: 2020-05-27

## 2020-05-20 NOTE — PROGRESS NOTES
"Subjective:       Patient ID: Tonya Rivas is a 39 y.o. female with multiple medical diagnoses as listed in the medical history and problem list that presents for Recurrent Skin Infections (post right lower leg x 2 days)  .    Chief Complaint: Recurrent Skin Infections (post right lower leg x 2 days)      Rash   This is a new problem. The current episode started in the past 7 days (2 days ). The problem has been gradually worsening since onset. The affected locations include the right lower leg. The rash is characterized by redness, pain, itchiness and swelling. She was exposed to nothing (sitting more at work she states ). Pertinent negatives include no fatigue, fever, joint pain or shortness of breath. Past treatments include anti-itch cream and antihistamine (hot shower today bothered it ).   no family history of clot  No BP  Has not been regular on her ASA 81 mg.     Review of Systems   Constitutional: Negative for fatigue and fever.   Respiratory: Negative for shortness of breath.    Musculoskeletal: Negative for joint pain.   Skin: Positive for rash.         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     Diabetes mellitus type II     uncontrolled    Herpes simplex virus (HSV) infection     "cold sores"    History of  delivery, currently pregnant x2     Kidney stones     Obesity 2013       SOCIAL HISTORY:  Social History     Socioeconomic History    Marital status:      Spouse name: Not on file    Number of children: Not on file    Years of education: Not on file    Highest education level: Not on file   Occupational History    Occupation: RN     Comment: Dialysis   Social Needs    Financial resource strain: Not hard at all    Food insecurity:     Worry: Not on file     Inability: Not on file    Transportation needs:     Medical: Not on file     Non-medical: Not on file   Tobacco Use    Smoking status: " Never Smoker    Smokeless tobacco: Never Used   Substance and Sexual Activity    Alcohol use: No     Frequency: Monthly or less     Drinks per session: 1 or 2     Binge frequency: Never     Comment: social    Drug use: No    Sexual activity: Yes     Partners: Male     Birth control/protection: None   Lifestyle    Physical activity:     Days per week: 3 days     Minutes per session: 30 min    Stress: Rather much   Relationships    Social connections:     Talks on phone: Twice a week     Gets together: Twice a week     Attends Mosque service: Not on file     Active member of club or organization: No     Attends meetings of clubs or organizations: Patient refused     Relationship status:    Other Topics Concern    Are you pregnant or think you may be? No    Breast-feeding No   Social History Narrative    Not on file       ALLERGIES AND MEDICATIONS: updated and reviewed.  Review of patient's allergies indicates:   Allergen Reactions    Bydureon [exenatide microspheres]      Current Outpatient Medications   Medication Sig Dispense Refill    aspirin 81 MG Chew Take 81 mg by mouth once daily.      augmented betamethasone dipropionate (DIPROLENE-AF) 0.05 % cream Apply to affected areas of fingers BID prn psoriasis. 50 g 1    blood sugar diagnostic (ONETOUCH ULTRA BLUE TEST STRIP) Strp 1 strip by Misc.(Non-Drug; Combo Route) route 4 (four) times daily. 360 strip 3    clotrimazole-betamethasone 1-0.05% (LOTRISONE) cream Apply topically 2 (two) times daily. 45 g 4    fluconazole (DIFLUCAN) 150 MG Tab TAKE ONE TABLET BY MOUTH ONCE AND MAY REPEAT IN 3 DAYS IF STILL SYMPTOMATIC 2 tablet 0    JARDIANCE 10 mg tablet       meloxicam (MOBIC) 15 MG tablet Take 1 tablet (15 mg total) by mouth once daily. 1 pill per day everyday for the next 3 weeks with food 30 tablet 1    metFORMIN (GLUCOPHAGE) 1000 MG tablet Take 1 tablet (1,000 mg total) by mouth 2 (two) times daily with meals. 180 tablet 3    TRULICITY  "1.5 mg/0.5 mL PnIj       rivaroxaban (XARELTO) 15 mg (42)- 20 mg (9) tablet dose pack Take 1 tablet (15 mg) by mouth twice daily with food for 21 days followed by 1 tablet (20 mg) by mouth once daily with food 1 Package 0     No current facility-administered medications for this visit.          Objective:   /84 (BP Location: Left arm, Patient Position: Sitting, BP Method: Medium (Manual))   Pulse 97   Temp 98 °F (36.7 °C) (Oral)   Resp 17   Ht 5' 8" (1.727 m)   Wt 98.9 kg (218 lb 0.6 oz)   SpO2 98%   BMI 33.15 kg/m²      Physical Exam   Constitutional: She is oriented to person, place, and time. No distress.   HENT:   Head: Normocephalic and atraumatic.   Musculoskeletal:        Left lower leg: She exhibits tenderness and swelling. She exhibits no bony tenderness and no deformity.        Left foot: There is normal range of motion and no tenderness.   Pain with plantar flexion and walking  Achy    Neurological: She is alert and oriented to person, place, and time.   Skin:        Psychiatric: She has a normal mood and affect. Her behavior is normal.           Assessment:       1. Left leg swelling    2. Controlled type 2 diabetes mellitus without complication, without long-term current use of insulin        Plan:       Left leg swelling  -     US Lower Extremity Veins Left; Future; Expected date: 05/20/2020  -     rivaroxaban (XARELTO) 15 mg (42)- 20 mg (9) tablet dose pack; Take 1 tablet (15 mg) by mouth twice daily with food for 21 days followed by 1 tablet (20 mg) by mouth once daily with food  Dispense: 1 Package; Refill: 0  Concern for blood clot based on presentation and exam  appt made for 5 pm  Pt will head over now  If she gets a call later on today, she has xarelto sent in.   Will refer to heme for further eval if DVT.     Controlled type 2 diabetes mellitus without complication, without long-term current use of insulin  -     Hemoglobin A1C; Future; Expected date: 05/20/2020  -     CBC auto " differential; Future; Expected date: 05/21/2020            No follow-ups on file.

## 2020-05-20 NOTE — TELEPHONE ENCOUNTER
Pt aware neg dvt  Will start abx  Marked area to monitor for growth  Send updated photo fri or Monday  Follow up sooner if worsening

## 2020-09-25 DIAGNOSIS — E11.9 TYPE 2 DIABETES MELLITUS WITHOUT COMPLICATION: ICD-10-CM

## 2020-10-05 ENCOUNTER — PATIENT MESSAGE (OUTPATIENT)
Dept: ADMINISTRATIVE | Facility: HOSPITAL | Age: 40
End: 2020-10-05

## 2020-10-27 ENCOUNTER — OFFICE VISIT (OUTPATIENT)
Dept: FAMILY MEDICINE | Facility: CLINIC | Age: 40
End: 2020-10-27
Payer: COMMERCIAL

## 2020-10-27 VITALS
WEIGHT: 197.56 LBS | HEIGHT: 68 IN | DIASTOLIC BLOOD PRESSURE: 80 MMHG | TEMPERATURE: 99 F | HEART RATE: 86 BPM | BODY MASS INDEX: 29.94 KG/M2 | SYSTOLIC BLOOD PRESSURE: 126 MMHG | OXYGEN SATURATION: 95 %

## 2020-10-27 DIAGNOSIS — E11.65 UNCONTROLLED TYPE 2 DIABETES MELLITUS WITH HYPERGLYCEMIA: ICD-10-CM

## 2020-10-27 DIAGNOSIS — R59.0 ANTERIOR CERVICAL LYMPHADENOPATHY: Primary | ICD-10-CM

## 2020-10-27 PROCEDURE — 3044F PR MOST RECENT HEMOGLOBIN A1C LEVEL <7.0%: ICD-10-PCS | Mod: CPTII,S$GLB,, | Performed by: NURSE PRACTITIONER

## 2020-10-27 PROCEDURE — 3044F HG A1C LEVEL LT 7.0%: CPT | Mod: CPTII,S$GLB,, | Performed by: NURSE PRACTITIONER

## 2020-10-27 PROCEDURE — 3008F BODY MASS INDEX DOCD: CPT | Mod: CPTII,S$GLB,, | Performed by: NURSE PRACTITIONER

## 2020-10-27 PROCEDURE — 3008F PR BODY MASS INDEX (BMI) DOCUMENTED: ICD-10-PCS | Mod: CPTII,S$GLB,, | Performed by: NURSE PRACTITIONER

## 2020-10-27 PROCEDURE — 99214 OFFICE O/P EST MOD 30 MIN: CPT | Mod: S$GLB,,, | Performed by: NURSE PRACTITIONER

## 2020-10-27 PROCEDURE — 99214 PR OFFICE/OUTPT VISIT, EST, LEVL IV, 30-39 MIN: ICD-10-PCS | Mod: S$GLB,,, | Performed by: NURSE PRACTITIONER

## 2020-10-27 PROCEDURE — 99999 PR PBB SHADOW E&M-EST. PATIENT-LVL IV: ICD-10-PCS | Mod: PBBFAC,,, | Performed by: NURSE PRACTITIONER

## 2020-10-27 PROCEDURE — 99999 PR PBB SHADOW E&M-EST. PATIENT-LVL IV: CPT | Mod: PBBFAC,,, | Performed by: NURSE PRACTITIONER

## 2020-10-27 NOTE — PROGRESS NOTES
"  ______________________________________________________________________    Chief Complaint  Chief Complaint   Patient presents with    Adenopathy       HPI  Tonya Rivas is a 40 y.o. female with medical diagnoses as listed in the medical history and problem list that presents for adenopathy.  This patient is new to me. Last seen in primary care .      Patient noticed adenopathy over the weekend. Denies recent or current hx of URI, neck trauma,  Fever, voice change,  headache, fatigue, weight loss, chills, night sweats, N&V, dizziness, syncope or difficulty breathing or swallowing. Patient complains of mild discomfort on palpation.       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     Diabetes mellitus type II     uncontrolled    Herpes simplex virus (HSV) infection     "cold sores"    History of  delivery, currently pregnant x2     Kidney stones     Obesity 2013       PAST SURGICAL HISTORY:  Past Surgical History:   Procedure Laterality Date     SECTION      x3    DILATION AND CURETTAGE OF UTERUS USING SUCTION N/A 2019    Procedure: DILATION AND CURETTAGE, UTERUS, USING SUCTION;  Surgeon: Tonya Cavanaugh MD;  Location: [facility];  Service: OB/GYN;  Laterality: N/A;    EXTRACORPOREAL SHOCK WAVE LITHOTRIPSY      LAPAROSCOPIC GASTRIC BANDING         SOCIAL HISTORY:  Social History     Socioeconomic History    Marital status:      Spouse name: Not on file    Number of children: Not on file    Years of education: Not on file    Highest education level: Not on file   Occupational History    Occupation: RN     Comment: Dialysis   Social Needs    Financial resource strain: Not hard at all    Food insecurity     Worry: Not on file     Inability: Not on file    Transportation needs     Medical: Not on file     Non-medical: Not on file   Tobacco Use    Smoking status: Never Smoker    Smokeless " tobacco: Never Used   Substance and Sexual Activity    Alcohol use: No     Frequency: Monthly or less     Drinks per session: 1 or 2     Binge frequency: Never     Comment: social    Drug use: No    Sexual activity: Yes     Partners: Male     Birth control/protection: None   Lifestyle    Physical activity     Days per week: 3 days     Minutes per session: 30 min    Stress: Rather much   Relationships    Social connections     Talks on phone: Twice a week     Gets together: Twice a week     Attends Scientology service: Not on file     Active member of club or organization: No     Attends meetings of clubs or organizations: Patient refused     Relationship status:    Other Topics Concern    Are you pregnant or think you may be? No    Breast-feeding No   Social History Narrative    Not on file       FAMILY HISTORY:  Family History   Problem Relation Age of Onset    Cancer Mother     Learning disabilities Son     Heart disease Maternal Grandfather     Breast cancer Neg Hx     Colon cancer Neg Hx     Ovarian cancer Neg Hx        ALLERGIES AND MEDICATIONS: updated and reviewed.  Review of patient's allergies indicates:   Allergen Reactions    Bydureon [exenatide microspheres]      Current Outpatient Medications   Medication Sig Dispense Refill    aspirin 81 MG Chew Take 81 mg by mouth once daily.      augmented betamethasone dipropionate (DIPROLENE-AF) 0.05 % cream Apply to affected areas of fingers BID prn psoriasis. 50 g 1    blood sugar diagnostic (ONETOUCH ULTRA BLUE TEST STRIP) Strp 1 strip by Misc.(Non-Drug; Combo Route) route 4 (four) times daily. 360 strip 3    clotrimazole-betamethasone 1-0.05% (LOTRISONE) cream Apply topically 2 (two) times daily. 45 g 4    fluconazole (DIFLUCAN) 150 MG Tab TAKE ONE TABLET BY MOUTH ONCE AND MAY REPEAT IN 3 DAYS IF STILL SYMPTOMATIC 2 tablet 0    JARDIANCE 10 mg tablet       metFORMIN (GLUCOPHAGE) 1000 MG tablet Take 1 tablet (1,000 mg total) by mouth  "2 (two) times daily with meals. 180 tablet 3    rivaroxaban (XARELTO) 15 mg (42)- 20 mg (9) tablet dose pack Take 1 tablet (15 mg) by mouth twice daily with food for 21 days followed by 1 tablet (20 mg) by mouth once daily with food 1 Package 0    TRULICITY 1.5 mg/0.5 mL PnIj        No current facility-administered medications for this visit.          ROS  Review of Systems   Constitutional: Negative for appetite change, chills, fatigue and fever.   HENT: Negative for congestion, drooling, ear pain, hearing loss, postnasal drip, rhinorrhea, sinus pressure, sneezing, sore throat, tinnitus and voice change.    Respiratory: Negative for shortness of breath, wheezing and stridor.    Cardiovascular: Negative for chest pain and palpitations.   Gastrointestinal: Negative for abdominal pain, constipation, diarrhea, nausea and vomiting.   Genitourinary: Negative for dysuria, flank pain, frequency and urgency.   Musculoskeletal: Negative for arthralgias.   Neurological: Negative for headaches.           Physical Exam  Vitals:    10/27/20 1731   BP: 126/80   Pulse: 86   Temp: 98.8 °F (37.1 °C)   TempSrc: Oral   SpO2: 95%   Weight: 89.6 kg (197 lb 8.5 oz)   Height: 5' 8" (1.727 m)    Body mass index is 30.03 kg/m².  Weight: 89.6 kg (197 lb 8.5 oz)   Height: 5' 8" (172.7 cm)   Physical Exam  Constitutional:       General: She is not in acute distress.     Appearance: Normal appearance.   HENT:      Head:      Comments: Bilateral swollen anterior cervical lymph nodes      Right Ear: External ear normal.      Left Ear: External ear normal.      Nose: Nose normal.      Mouth/Throat:      Mouth: Mucous membranes are moist.      Pharynx: No oropharyngeal exudate or posterior oropharyngeal erythema.   Eyes:      Pupils: Pupils are equal, round, and reactive to light.   Neck:      Musculoskeletal: Neck supple.   Cardiovascular:      Rate and Rhythm: Normal rate and regular rhythm.      Pulses: Normal pulses.   Pulmonary:      Effort: " Pulmonary effort is normal. No respiratory distress.      Breath sounds: Normal breath sounds. No stridor. No wheezing or rhonchi.   Abdominal:      General: Bowel sounds are normal. There is no distension.      Palpations: Abdomen is soft. There is no mass.      Tenderness: There is no abdominal tenderness.      Hernia: No hernia is present.   Musculoskeletal:         General: No swelling, tenderness, deformity or signs of injury.   Lymphadenopathy:      Cervical: No cervical adenopathy.   Skin:     General: Skin is warm and dry.      Capillary Refill: Capillary refill takes less than 2 seconds.      Coloration: Skin is not jaundiced or pale.      Findings: No bruising or erythema.   Neurological:      General: No focal deficit present.      Mental Status: She is alert and oriented to person, place, and time. Mental status is at baseline.   Psychiatric:         Mood and Affect: Mood normal.             Health Maintenance       Date Due Completion Date    Low Dose Statin 08/04/1999 ---    Influenza Vaccine (1) 08/01/2020 9/11/2019    Hemoglobin A1c 08/20/2020 5/20/2020    Foot Exam 09/03/2020 9/3/2019 (Done)    Override on 9/3/2019: Done    Lipid Panel 09/21/2020 9/21/2019    Urine Microalbumin 09/21/2020 9/21/2019    Eye Exam 12/27/2020 12/27/2019    Override on 12/31/2015: Done (Dr. Neri Peña([facility])- negative for diabetic retinopathy bilaterally)    Mammogram 03/12/2021 3/12/2019    Cervical Cancer Screening 03/12/2022 3/12/2019    TETANUS VACCINE 10/26/2027 10/26/2017            Assessment & Plan  Problem List Items Addressed This Visit        Endocrine    Uncontrolled type 2 diabetes mellitus with hyperglycemia started on insulin 5/2019  The current medical regimen is effective;  continue present plan and medications.       Other Visit Diagnoses     Anterior cervical lymphadenopathy    -  Primary  -Patient was given my fax number recent CBC done at [facility] ordered after [facility] visit.   -Patient advised  to use ice backs and tylenol for swollen lymph nodes  -Patient advised to use Augmentin in a week if lymph nodes remain swollen   -If lymph nodes are swollen after using Augmentin patient will benefit from a CT scan of neck with contrast  -Patient verbalized understanding of plan discussed in clinic.             Health Maintenance reviewed     Follow-up: Follow up if symptoms worsen or fail to improve.

## 2020-11-02 ENCOUNTER — PATIENT MESSAGE (OUTPATIENT)
Dept: FAMILY MEDICINE | Facility: CLINIC | Age: 40
End: 2020-11-02

## 2020-11-04 ENCOUNTER — PATIENT MESSAGE (OUTPATIENT)
Dept: FAMILY MEDICINE | Facility: CLINIC | Age: 40
End: 2020-11-04

## 2020-11-04 DIAGNOSIS — R59.0 ANTERIOR CERVICAL LYMPHADENOPATHY: Primary | ICD-10-CM

## 2020-11-11 ENCOUNTER — PATIENT MESSAGE (OUTPATIENT)
Dept: FAMILY MEDICINE | Facility: CLINIC | Age: 40
End: 2020-11-11

## 2020-11-11 DIAGNOSIS — R59.0 ANTERIOR CERVICAL LYMPHADENOPATHY: Primary | ICD-10-CM

## 2020-11-11 RX ORDER — AMOXICILLIN AND CLAVULANATE POTASSIUM 875; 125 MG/1; MG/1
1 TABLET, FILM COATED ORAL 2 TIMES DAILY
Qty: 20 TABLET | Refills: 0 | Status: SHIPPED | OUTPATIENT
Start: 2020-11-11 | End: 2020-11-21

## 2020-11-15 ENCOUNTER — PATIENT OUTREACH (OUTPATIENT)
Dept: ADMINISTRATIVE | Facility: OTHER | Age: 40
End: 2020-11-15

## 2020-11-15 NOTE — PROGRESS NOTES
Health Maintenance Due   Topic Date Due    Low Dose Statin  08/04/1999    Hemoglobin A1c  08/20/2020    Foot Exam  09/03/2020    Lipid Panel  09/21/2020    Urine Microalbumin  09/21/2020     Updates were requested from care everywhere.  Chart was reviewed for overdue Proactive [facility] Encounters ([facility]ONARD[facility]) topics (CRS, Breast Cancer Screening, Eye exam)  Health Maintenance has been updated.  LIN[place] immunization registry triggered.  Immunizations were reconciled.

## 2020-11-16 ENCOUNTER — OFFICE VISIT (OUTPATIENT)
Dept: PODIATRY | Facility: CLINIC | Age: 40
End: 2020-11-16
Payer: COMMERCIAL

## 2020-11-16 VITALS
BODY MASS INDEX: 29.86 KG/M2 | SYSTOLIC BLOOD PRESSURE: 126 MMHG | WEIGHT: 197 LBS | DIASTOLIC BLOOD PRESSURE: 81 MMHG | HEIGHT: 68 IN | HEART RATE: 93 BPM

## 2020-11-16 DIAGNOSIS — M72.2 PLANTAR FASCIITIS: ICD-10-CM

## 2020-11-16 DIAGNOSIS — M20.5X2 HALLUX LIMITUS, ACQUIRED, LEFT: ICD-10-CM

## 2020-11-16 DIAGNOSIS — M20.5X1 HALLUX LIMITUS, ACQUIRED, RIGHT: ICD-10-CM

## 2020-11-16 DIAGNOSIS — M79.671 FOOT PAIN, RIGHT: ICD-10-CM

## 2020-11-16 DIAGNOSIS — E11.9 COMPREHENSIVE DIABETIC FOOT EXAMINATION, TYPE 2 DM, ENCOUNTER FOR: Primary | ICD-10-CM

## 2020-11-16 DIAGNOSIS — M20.42 HAMMER TOES OF BOTH FEET: ICD-10-CM

## 2020-11-16 DIAGNOSIS — M20.41 HAMMER TOES OF BOTH FEET: ICD-10-CM

## 2020-11-16 PROCEDURE — 99214 OFFICE O/P EST MOD 30 MIN: CPT | Mod: 25,S$GLB,, | Performed by: PODIATRIST

## 2020-11-16 PROCEDURE — 99214 PR OFFICE/OUTPT VISIT, EST, LEVL IV, 30-39 MIN: ICD-10-PCS | Mod: 25,S$GLB,, | Performed by: PODIATRIST

## 2020-11-16 PROCEDURE — 99999 PR PBB SHADOW E&M-EST. PATIENT-LVL IV: CPT | Mod: PBBFAC,,, | Performed by: PODIATRIST

## 2020-11-16 PROCEDURE — 20550 NJX 1 TENDON SHEATH/LIGAMENT: CPT | Mod: RT,S$GLB,, | Performed by: PODIATRIST

## 2020-11-16 PROCEDURE — 20550 PR INJECT TENDON SHEATH/LIGAMENT: ICD-10-PCS | Mod: RT,S$GLB,, | Performed by: PODIATRIST

## 2020-11-16 PROCEDURE — 99999 PR PBB SHADOW E&M-EST. PATIENT-LVL IV: ICD-10-PCS | Mod: PBBFAC,,, | Performed by: PODIATRIST

## 2020-11-16 RX ORDER — BUPIVACAINE HYDROCHLORIDE 5 MG/ML
1 INJECTION, SOLUTION EPIDURAL; INTRACAUDAL ONCE
Status: COMPLETED | OUTPATIENT
Start: 2020-11-16 | End: 2020-11-16

## 2020-11-16 RX ORDER — TRIAMCINOLONE ACETONIDE 40 MG/ML
20 INJECTION, SUSPENSION INTRA-ARTICULAR; INTRAMUSCULAR ONCE
Status: COMPLETED | OUTPATIENT
Start: 2020-11-16 | End: 2020-11-16

## 2020-11-16 RX ORDER — LIDOCAINE HYDROCHLORIDE 20 MG/ML
1 INJECTION, SOLUTION EPIDURAL; INFILTRATION; INTRACAUDAL; PERINEURAL ONCE
Status: COMPLETED | OUTPATIENT
Start: 2020-11-16 | End: 2020-11-16

## 2020-11-16 RX ORDER — DEXAMETHASONE SODIUM PHOSPHATE 4 MG/ML
2 INJECTION, SOLUTION INTRA-ARTICULAR; INTRALESIONAL; INTRAMUSCULAR; INTRAVENOUS; SOFT TISSUE ONCE
Status: COMPLETED | OUTPATIENT
Start: 2020-11-16 | End: 2020-11-16

## 2020-11-16 RX ADMIN — TRIAMCINOLONE ACETONIDE 20 MG: 40 INJECTION, SUSPENSION INTRA-ARTICULAR; INTRAMUSCULAR at 08:11

## 2020-11-16 RX ADMIN — LIDOCAINE HYDROCHLORIDE 20 MG: 20 INJECTION, SOLUTION EPIDURAL; INFILTRATION; INTRACAUDAL; PERINEURAL at 08:11

## 2020-11-16 RX ADMIN — BUPIVACAINE HYDROCHLORIDE 5 MG: 5 INJECTION, SOLUTION EPIDURAL; INTRACAUDAL at 08:11

## 2020-11-16 RX ADMIN — DEXAMETHASONE SODIUM PHOSPHATE 2 MG: 4 INJECTION, SOLUTION INTRA-ARTICULAR; INTRALESIONAL; INTRAMUSCULAR; INTRAVENOUS; SOFT TISSUE at 08:11

## 2020-11-16 NOTE — PROGRESS NOTES
Subjective:      Patient ID: Tonya Rivas is a 40 y.o. female.    Chief Complaint: Foot Problem (right PCP Dr. Duncan ), Diabetes Mellitus, Diabetic Foot Exam, and Nail Care    Tonya is a 40 y.o. female who presents to the clinic for evaluation and treatment of high risk feet. 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  She presents to the clinic complaining of bilateral plantar medial heel pain (right greater than left), especially with the first step in the morning. The pain is described as Aching, Burning and Throbbing. The onset of the pain was gradual and has worsened over the past several months. Tonya Rivas rates the pain as 8/10. she denies a history of trauma. she relates pain began following increased activity.  Patient relates she began to do exercise program in her home on the concrete floors in flexible Nike tennis shoe.  Prior treatments include 800 mg ibuprofen twice daily with some relief.      2019 patient presents to clinic with persistent plantar fasciitis despite icing stretching and changing her shoes.  She relates that the Mobic did not provide relief.  She would like to discuss other options both surgical and conservative.  She also relates this new onset of dry scaly skin to the plantar foot that also causes cracking to the plantar aspect of hallux bilaterally. She states that on the right side she also gets some itching and redness to the dorsum of the foot. She has tried self-treatment with moisturizing lotion such as creve and gold bond without relief.  She relates that since she has not been able to exercise her blood sugars have been elevated.    2020 patient presents to clinic for recurrence of plantar fasciitis to the right heel.  She relates that she uses a 4  device to stretch regularly and attempts to wear supportive shoes most the time.  She presents today in an [employer] shoe.  Patient relates that for the last several months she has been working from home primarily in slippers on hard surfaces.  She states she has not been icing or taking any medications.    This patient has documented high risk feet requiring routine maintenance secondary to diabetes mellitis and those secondary complications of diabetes, as mentioned..    PCP: Hernando Duncan MD    Date Last Seen by PCP:   Chief Complaint   Patient presents with    Foot Problem     right PCP Dr. Duncan     Diabetes Mellitus    Diabetic Foot Exam    Nail Care           Hemoglobin A1C   Date Value Ref Range Status   05/20/2020 6.7 (H) 4.0 - 5.6 % Final     Comment:     ADA Screening Guidelines:  5.7-6.4%  Consistent with prediabetes  >or=6.5%  Consistent with diabetes  High levels of fetal hemoglobin interfere with the HbA1C  assay. Heterozygous hemoglobin variants (HbS, HgC, etc)do  not significantly interfere with this assay.   However, presence of multiple variants may affect accuracy.     09/21/2019 7.0 (H) 4.0 - 5.6 % Final     Comment:     ADA Screening Guidelines:  5.7-6.4%  Consistent with prediabetes  >or=6.5%  Consistent with diabetes  High levels of fetal hemoglobin interfere with the HbA1C  assay. Heterozygous hemoglobin variants (HbS, HgC, etc)do  not significantly interfere with this assay.   However, presence of multiple variants may affect accuracy.     05/16/2019 9.5 (H) 4.0 - 5.6 % Final     Comment:     ADA Screening Guidelines:  5.7-6.4%  Consistent with prediabetes  >or=6.5%  Consistent with diabetes  High levels of fetal hemoglobin interfere with the HbA1C  assay. Heterozygous hemoglobin variants (HbS, HgC, etc)do  not significantly interfere with this assay.   However, presence of multiple variants may affect accuracy.             Patient Active Problem List   Diagnosis    Uncontrolled type 2  diabetes mellitus with hyperglycemia started on insulin 2019    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    History of herpes simplex    Type 2 diabetes mellitus affecting pregnancy in first trimester, antepartum    Missed     S/P suction dilation and curettage    Obstructive sleep apnea    Diabetic retinopathy of left eye associated with type 2 diabetes mellitus       Current Outpatient Medications on File Prior to Visit   Medication Sig Dispense Refill    amoxicillin-clavulanate 875-125mg (AUGMENTIN) 875-125 mg per tablet Take 1 tablet by mouth 2 (two) times daily. for 10 days 20 tablet 0    aspirin 81 MG Chew Take 81 mg by mouth once daily.      augmented betamethasone dipropionate (DIPROLENE-AF) 0.05 % cream Apply to affected areas of fingers BID prn psoriasis. 50 g 1    blood sugar diagnostic (ONETOUCH ULTRA BLUE TEST STRIP) Strp 1 strip by Misc.(Non-Drug; Combo Route) route 4 (four) times daily. 360 strip 3    clotrimazole-betamethasone 1-0.05% (LOTRISONE) cream Apply topically 2 (two) times daily. 45 g 4    fluconazole (DIFLUCAN) 150 MG Tab TAKE ONE TABLET BY MOUTH ONCE AND MAY REPEAT IN 3 DAYS IF STILL SYMPTOMATIC 2 tablet 0    JARDIANCE 10 mg tablet       metFORMIN (GLUCOPHAGE) 1000 MG tablet Take 1 tablet (1,000 mg total) by mouth 2 (two) times daily with meals. 180 tablet 3    TRULICITY 1.5 mg/0.5 mL PnIj       rivaroxaban (XARELTO) 15 mg (42)- 20 mg (9) tablet dose pack Take 1 tablet (15 mg) by mouth twice daily with food for 21 days followed by 1 tablet (20 mg) by mouth once daily with food 1 Package 0     No current facility-administered medications on file prior to visit.        Review of patient's allergies indicates:   Allergen Reactions    Bydureon [exenatide microspheres]        Past Surgical History:   Procedure Laterality Date     SECTION      x3    DILATION AND CURETTAGE OF UTERUS USING SUCTION N/A 2019    Procedure: DILATION AND CURETTAGE,  UTERUS, USING SUCTION;  Surgeon: Tonya Cavanaugh MD;  Location: [facility];  Service: OB/GYN;  Laterality: N/A;    EXTRACORPOREAL SHOCK WAVE LITHOTRIPSY      LAPAROSCOPIC GASTRIC BANDING  2005       Family History   Problem Relation Age of Onset    Cancer Mother     Learning disabilities Son     Heart disease Maternal Grandfather     Breast cancer Neg Hx     Colon cancer Neg Hx     Ovarian cancer Neg Hx        Social History     Socioeconomic History    Marital status:      Spouse name: Not on file    Number of children: Not on file    Years of education: Not on file    Highest education level: Not on file   Occupational History    Occupation: RN     Comment: Dialysis   Social Needs    Financial resource strain: Not hard at all    Food insecurity     Worry: Not on file     Inability: Not on file    Transportation needs     Medical: Not on file     Non-medical: Not on file   Tobacco Use    Smoking status: Never Smoker    Smokeless tobacco: Never Used   Substance and Sexual Activity    Alcohol use: No     Frequency: Monthly or less     Drinks per session: 1 or 2     Binge frequency: Never     Comment: social    Drug use: No    Sexual activity: Yes     Partners: Male     Birth control/protection: None   Lifestyle    Physical activity     Days per week: 3 days     Minutes per session: 30 min    Stress: Rather much   Relationships    Social connections     Talks on phone: Twice a week     Gets together: Twice a week     Attends Voodoo service: Not on file     Active member of club or organization: No     Attends meetings of clubs or organizations: Patient refused     Relationship status:    Other Topics Concern    Are you pregnant or think you may be? No    Breast-feeding No   Social History Narrative    Not on file       Review of Systems   Constitution: Negative for chills and fever.   Cardiovascular: Negative for claudication and leg swelling.   Respiratory: Negative for  "cough and shortness of breath.    Skin: Positive for dry skin. Negative for itching, nail changes and rash.   Musculoskeletal: Positive for joint pain and myalgias. Negative for falls, joint swelling and muscle weakness.   Gastrointestinal: Negative for diarrhea, nausea and vomiting.   Neurological: Positive for paresthesias. Negative for numbness, tremors and weakness.   Psychiatric/Behavioral: Negative for altered mental status and hallucinations.           Objective:      Vitals:    11/16/20 0752   BP: 126/81   Pulse: 93   Weight: 89.4 kg (197 lb)   Height: 5' 8" (1.727 m)   PainSc: 10-Worst pain ever   PainLoc: Foot       Physical Exam  Nursing note reviewed.   Constitutional:       General: She is not in acute distress.     Appearance: She is not toxic-appearing or diaphoretic.   Cardiovascular:      Pulses:           Dorsalis pedis pulses are 2+ on the right side and 2+ on the left side.        Posterior tibial pulses are 2+ on the right side and 2+ on the left side.   Pulmonary:      Effort: No respiratory distress.   Musculoskeletal:      Right ankle: She exhibits decreased range of motion. She exhibits no swelling. No tenderness. No lateral malleolus, no medial malleolus, no AITFL, no CF ligament and no posterior TFL tenderness found. Achilles tendon exhibits no pain, no defect and normal Chapa's test results.      Left ankle: She exhibits decreased range of motion. She exhibits no swelling. No tenderness. No lateral malleolus, no medial malleolus, no AITFL, no CF ligament and no posterior TFL tenderness found. Achilles tendon exhibits no pain, no defect and normal Chapa's test results.      Right foot: Tenderness present. No bony tenderness.      Left foot: No bony tenderness.      Comments: Biomechanical exam: Pain on palpation right plantar medial calcaneal tubercle at origin of plantar fascia. There is equinus deformity bilateral with decreased dorsiflexion at the ankle joint bilateral. No " tenderness with compression of heel. Negative tinels sign. Gait analysis reveals excessive pronation through midstance and propulsion with early heel off. Shoes reveals lateral heel counter wear bilateral     Decreased first MPJ range of motion both weightbearing and nonweightbearing, no crepitus observed the first MP joint, + dorsal flag sign. Mild  bunion deformity is observed .    Patient has hammertoes of digits 2-5 bilateral partially reducible      Skin:     General: Skin is warm and dry.      Coloration: Skin is not pale.      Findings: Laceration (Plantar hallux MTPJ bilaterally with thin epithelial skin) and rash present. No bruising, burn or lesion.      Nails: There is no clubbing.        Comments: Scaling dryness in a moccasin distribution is noted to the bilateral lower extremities with associated erythema.       Neurological:      Sensory: No sensory deficit.      Motor: No tremor, atrophy or abnormal muscle tone.      Deep Tendon Reflexes: Reflexes are normal and symmetric.   Psychiatric:         Attention and Perception: She is attentive.         Mood and Affect: Mood is not anxious. Affect is not inappropriate.         Speech: She is communicative. Speech is not slurred.         Behavior: Behavior is not combative.               Assessment:       Encounter Diagnoses   Name Primary?    Comprehensive diabetic foot examination, type 2 DM, encounter for Yes    Foot pain, right     Plantar fasciitis     Hammer toes of both feet     Hallux limitus, acquired, left     Hallux limitus, acquired, right          Plan:     Problem List Items Addressed This Visit     None      Visit Diagnoses     Comprehensive diabetic foot examination, type 2 DM, encounter for    -  Primary    Foot pain, right        Plantar fasciitis        Relevant Orders    Ambulatory referral/consult to Physical/Occupational Therapy    Hammer toes of both feet        Hallux limitus, acquired, left        Hallux limitus, acquired,  right             I counseled the patient on her conditions, their implications and medical management.    Orders Placed This Encounter    Ambulatory referral/consult to Physical/Occupational Therapy    bupivacaine (PF) 0.5% (5 mg/mL) injection 5 mg    dexamethasone injection 2 mg    lidocaine (PF) 20 mg/ml (2%) injection 20 mg    triamcinolone acetonide injection 20 mg       Greater than 50% of this visit spent on counseling and coordination of care.    Education about the diabetic foot, neuropathy, and prevention of limb loss.    Shoe inspection. Diabetic Foot Education. Patient reminded of the importance of good nutrition/healthy diet/weight management and blood sugar control to help prevent podiatric complications of diabetes. Patient instructed on proper foot hygeine. Wear comfortable, proper fitting shoes. Wash feet daily. Dry well. After drying, apply moisturizer to feet (no lotion to webspaces). Inspect feet daily for skin breaks, blisters, swelling, or redness. Wear cotton socks (preferably white)  Change socks every day. Do NOT walk barefoot. Do NOT use heating pads or hot water soaks. We discussed wearing proper shoe gear, daily foot inspections, never walking without protective shoe gear.     Discussed different treatment options for heel pain. I gave written and verbal instructions on stretching exercises. Patient expressed understanding. Discussed icing the affected area as needed and also wearing appropriate shoe gear and avoiding flats, slippers, sandals, and going barefoot. My recommendation for OTC supports is [facility].  Prescription for orthotics dispensed.  Patient instructed on adequate icing techniques. Patient should ice the affected area at least once per day x 10 minutes for 10 days . I advised the patient that extra icing would also be beneficial to ensure adequate anti inflammatory effect. We also discussed cortisone injections and NSAID therapy.     Discussed in detail how  uncontrolled diabetes will contribute to sluggish healing.  Patient requests steroid injection.  Informed her that this injection may also increase her blood glucose.    Patient would like injection today. Skin was prepped with alcohol and anesthetized with ethyl chloride.  The following mixture was injected into right medial heel,   plantar approach: 3ccs of mixture of (1cc 1% plain Lidocaine : 1cc 0.5% Marcaine plain:  0.5cc kenalog-40 : 0.5cc dexamethasone) directly into problematic areas of each foot.  Patient  tolerated the injection well. Related relief following injection.     RTC in 7-10 weeks if no improvement, at this time she will receive MRI in preparation for surgical consult. Patient is amenable to plan.

## 2020-11-16 NOTE — PATIENT INSTRUCTIONS
Supplements for inflammation: Arnica Tabs, bromelain with tumeric, alpha lipoic acid, garlic     Over the counter pain creams: Biofreeze, Bengay, tiger balm, two old goat, lidocaine gel,  Absorbine Veterinary Liniment Gel Topical Analgesic Sore Muscle and Joint Pain Relief    Recommend lotions: eucerin, eucerin for diabetics, aquaphor, A&D ointment, gold bond for diabetics, sween, Temo's Bees all purpose baby ointment,  urea 40 with aloe (found on amazon.com)    Shoe recommendations: (try 6pm.com, zappos.Core Solutions , nordstromrack.Core Solutions, or shoes.Core Solutions for discounted prices) you can visit DSW shoes in [place]  or [employer] in the [facility] (there are also several shoe brand outlets in the [facility])    Asics (GT 2000 or gel foundations), new balance stability type shoes (such as the 940 series), saucony (stabil c3),  Euceda (GTS or Beast or transcend), propet (tennis shoe)    Sofft Brand, baretraps (women) Arias&Carlos (men), clarks, crocs, aerosoles, naturalizers, SAS, ecco, born, rolly derick, alla (dress shoes)    Vionic, burkenstocks, fitflops, propet, baretraps (sandals)    Nike comfort thong sandals, crocs, propet (house shoes)    Nail Home remedy:  Vicks Vapor rub or Emuaid to nails for easier manageability    Occasional soaks for 15-20 mins in luke warm water with 1/2 cup of listerine and 1 cup of apple cider vinegar are ok You may add several drops of oil of oregano or tea tree oil as well    Understanding Plantar Fasciitis    Plantar fasciitis is a condition that causes foot and heel pain. The plantar fascia is a tough band of tissue that runs across the bottom of the foot from the heel to the toes. This tissue pulls on the heel bone. It supports the arch of the foot as it pushes off the ground. If the tissue becomes irritated or red and swollen (inflamed), it is called plantar fasciitis.  How to say it  PLAN-tuhr fa-see-IY-tis   What causes plantar fasciitis?  Plantar fasciitis most often occurs from  overusing the plantar fascia. The tissue may become damaged from activities that put repeated stress on the heel and foot. Or it may wear down over time with age and ankle stiffness. You are more likely to have plantar fasciitis if you:  · Do activities that require a lot of running, jumping, or dancing  · Have a job that requires being on your feet for long periods  · Are overweight or obese  · Have certain foot problems, such as a tight Achilles tendon, flat feet, or high arches  · Often wear poorly fitting shoes  Symptoms of plantar fasciitis  The condition most often causes pain in the heel and the bottom of the foot. The pain may occur when you take your first steps in the morning. It may get better as you walk throughout the day. But as you continue to put weight on the foot, the pain often returns. Pain may also occur after standing or sitting for long periods.  Treating plantar fasciitis  Treatments for plantar fasciitis include:  · Resting the foot. This involves limiting movements that make your foot hurt. You may also need to avoid certain sports and types of work for a time.  · Using cold packs. Put an ice pack on the heel and foot to help reduce pain and swelling.  · Taking pain medicines. Prescription and over-the-counter pain medicines can help relieve pain and swelling.  · Using heel cups or foot inserts (orthotics). These are placed in the shoes to help support the heel or arch and cushion the heel. You may also be told to buy proper-fitting shoes with good arch support and cushioned soles.  · Taping the foot. This supports the arch and limits the movement of the plantar fascia to help relieve symptoms.  · Wearing a night splint. This stretches the plantar fascia and leg muscles while you sleep. This may help relieve pain.  · Doing exercises and physical therapy. These stretch and strengthen the plantar fascia and the muscles in the leg that support the heel and foot.  · Getting shots of medicine  into the foot. These may help relieve symptoms for a time.  · Having surgery. This may be needed if other treatments fail to relieve symptoms. During surgery, the surgeon may partially cut the plantar fascia to release tension.  Possible complications of plantar fasciitis  Without proper care and treatment, healing may take longer than normal. Also, symptoms may continue or get worse. Over time, the plantar fascia may be damaged. This can make it hard to walk or even stand without pain.  When to call your healthcare provider  Call your healthcare provider right away if you have any of these:  · Fever of 100.4°F (38°C) or higher, or as directed  · Symptoms that dont get better with treatment, or get worse  · New symptoms, such as numbness, tingling, or weakness in the foot   © 4501-2986 [employer]. [address] [place] [address], [place] [address]. All rights reserved. This information is not intended as a substitute for professional medical care. Always follow your healthcare professional's instructions.        Treating Plantar Fasciitis  First, your healthcare provider tries to determine the cause of your problem in order to suggest ways to relieve pain. If your pain is due to poor foot mechanics, custom-made shoe inserts (orthoses) may help.    Reduce symptoms  · To relieve mild symptoms, try aspirin, ibuprofen, or other medicines as directed. Rubbing ice on the affected area may also help.  · To reduce severe pain and swelling, your healthcare provider may prescribe pills or injections or a walking cast in some instances. Physical therapy, such as ultrasound or a daily stretching program, may also be recommended. Surgery is rarely required.  · To reduce symptoms caused by poor foot mechanics, your foot may be taped. This supports the arch and temporarily controls movement. Night splints may also help by stretching the fascia.  Control movement  If taping helps, your healthcare provider may prescribe  orthoses. Built from plaster casts of your feet, these inserts control the way your foot moves. As a result, your symptoms should go away.  Reduce overuse  Every time your foot strikes the ground, the plantar fascia is stretched. You can reduce the strain on the plantar fascia and the possibility of overuse by following these suggestions:  · Lose any excess weight.  · Avoid running on hard or uneven ground.  · Use orthoses at all times in your shoes and house slippers.  If surgery is needed  Your healthcare provider may consider surgery if other types of treatment don't control your pain. During surgery, the plantar fascia is partially cut to release tension. As you heal, fibrous tissue fills the space between the heel bone and the plantar fascia.   © 3639-5668 [employer]. [address] [place] [address], [place] [address]. All rights reserved. This information is not intended as a substitute for professional medical care. Always follow your healthcare professional's instructions.        Wearing Proper Shoes                    You walk on your feet every day, forcing them to support the weight of your body. Repeated stress on your feet can cause damage over time. The right shoes can help protect your feet. The wrong shoes can cause more foot problems. Read the information below to help you find a shoe that fits your foot needs.     A good shoe fit will cover your foot outline.       A shoe that doesnt cover the outline is a bad fit.      Whats Your Foot Shape?  To get a good fit, you need to know the shape of your foot. Do this simple test: While standing, place your foot on a piece of paper and trace around it. Is your foot straight or curved? Do you have a foot problem, such as a bunion, that causes your foot outline to show a bulge on the side of your big toe?  Finding Your Fit  Bring your foot outline to the [employer] store to help you find the right shoe. Place a shoe you like on top of the outline to see  if it matches the shape. The shoe should cover the outline. (If you have a bunion, the shoe may not cover the bulge on the outline. Look for soft leather shoes to stretch over the bunion.) Once youve found a pair of proper shoes, put them on. Walk around. Be sure the shoes dont rub or pinch. If the shoes feel good, youve found your fit!  The Right Shoe for You  A good shoe has features that provide comfort and support. It must also be the right size and shape for your feet. Look for a shoe made of breathable fabric and lining, such as leather or canvas. Be sure that shoes have enough tread to prevent slipping. Go to a good shoe store for help finding the right shoe.  Good Shoe Features  An ideal shoe has the following:  · Laces for support. If tying laces is a problem for you, try shoes with Velcro fasteners or halley.  · A front of the shoe (toe box) with ½ inch space in front of your longest toes.  · An arch shape that supports your foot.  · No more than 1½ inches of heel.  · A stiff, snug back of the shoe to keep your foot from sliding around.  · A smooth lining with no rough seams.  Shoe Shopping Tips  Below are some dos and donts for when you go to the shoe store.  Do:  · Select the shoes that feel right. Wear them around the house. Then bring them to your foot doctor to check for fit. If they dont fit well, return them.  · Shop late in the day, when your feet will be slightly bigger.  · Each time you buy shoes, have both your feet measured while you are standing. Foot size changes with time.  · Pick shoes to suit their purpose. High heels are okay for an occasional night on the town. But for everyday wear, choose a more sensible shoe.  · Try on shoes while wearing any inserts specially made for your feet (orthoses).  · Try on both the right and left shoes. If your feet are different sizes, pick a pair that fits the larger foot.  Dont:  · Dont buy shoes based on shoe size alone. Always try on shoes, as  sizes differ from brand to brand and within brands.  · Dont expect shoes to break in. If they dont fit at the store, dont buy them.  · Dont buy a shoe that doesnt match your foot shape.  What About Socks?  Always wear socks with shoes. Socks help absorb sweat and reduce friction and blistering. When shopping for shoes, choose soft, padded socks with seams that dont irritate your feet.  If You Have Foot Problems  Some foot problems cause deformities. This can make it hard to find a good fit. Look for shoes made of soft leather to stretch over the deformity. If you have bunions, buy shoes with a wider toe box. To fit hammertoes, look for shoes with a tall toe box. If you have arch problems, you may need inserts. In some cases, youll need to have custom footwear or orthoses made for your feet.  Suggested Footwear  Ask your healthcare provider what kind of footwear you need. He or she may recommend a certain brand or shoe store.  © 4023-8724 [employer]. [address] [place] [address]. All rights reserved. This information is not intended as a substitute for professional medical care. Always follow your healthcare professional's instructions.        Toe Extension (Flexibility)    These instructions are for your right foot. Switch sides for your left foot.  1. Sit in a chair. Rest your right ankle on your left knee.  2. Hold your toes with your right hand. Gently bend the toes backward. Feel a stretch in the undersides of the toes and ball of the foot. Hold for 30 to 60 seconds.  3. Then gently bend the toes in the other direction. Gently press on them until your foot is pointed. Hold for 30 to 60 seconds.  4. Repeat 5 times, or as instructed.  © 0223-6182 [employer]. [address] [place] [address]. All rights reserved. This information is not intended as a substitute for professional medical care. Always follow your healthcare professional's  instructions.            Diabetes: Inspecting Your Feet  Diabetes increases your chances of developing foot problems. So inspect your feet every day. This helps you find small skin irritations before they become serious infections. If you have trouble seeing the bottoms of your feet, use a mirror or ask a family member or friend to help.     Pressure spots on the bottom of the foot are common areas where problems develop.   How to check your feet  Below are tips to help you look for foot problems. Try to check your feet at the same time each day, such as when you get out of bed in the morning:  · Check the top of each foot. The tops of toes, back of the heel, and outer edge of the foot can get a lot of rubbing from poor-fitting shoes.  · Check the bottom of each foot. Daily wear and tear often leads to problems at pressure spots.  · Check the toes and nails. Fungal infections often occur between toes. Toenail problems can also be a sign of fungal infections or lead to breaks in the skin.  · Check your shoes, too. Loose objects inside a shoe can injure the foot. Use your hand to feel inside your shoes for things like barak, loose stitching, or rough areas that could irritate your skin.  Warning signs  Look for any color changes in the foot. Redness with streaks can signal a severe infection, which needs immediate medical attention. Tell your doctor right away if you have any of these problems:  · Swelling, sometimes with color changes, may be a sign of poor blood flow or infection. Symptoms include tenderness and an increase in the size of your foot.  · Warm or hot areas on your feet may be signs of infection. A foot that is cold may not be getting enough blood.  · Sensations such as burning, tingling, or pins and needles can be signs of a problem. Also check for areas that may be numb.  · Hot spots are caused by friction or pressure. Look for hot spots in areas that get a lot of rubbing. Hot spots can turn into  blisters, calluses, or sores.  · Cracks and sores are caused by dry or irritated skin. They are a sign that the skin is breaking down, which can lead to infection.  · Toenail problems to watch for include nails growing into the skin (ingrown toenail) and causing redness or pain. Thick, yellow, or discolored nails can signal a fungal infection.  · Drainage and odor can develop from untreated sores and ulcers. Call your doctor right away if you notice white or yellow drainage, bleeding, or unpleasant odor.   © 8826-2168 [employer]. [address] [place] [address]. All rights reserved. This information is not intended as a substitute for professional medical care. Always follow your healthcare professional's instructions.        Step-by-Step:  Inspecting Your Feet (Diabetes)    Date Last Reviewed: 10/1/2016  © 4131-2992 [employer]. [address] [place] [address]. All rights reserved. This information is not intended as a substitute for professional medical care. Always follow your healthcare professional's instructions.        Supplements for inflammation: Arnica Tabs, bromelain with tumeric, alpha lipoic acid, garlic     Over the counter pain creams: Biofreeze, Bengay, tiger balm, two old goat, lidocaine gel,  Absorbine Veterinary Liniment Gel Topical Analgesic Sore Muscle and Joint Pain Relief    Recommend lotions: eucerin, eucerin for diabetics, aquaphor, A&D ointment, gold bond for diabetics, sween, Greenbrier's Bees all purpose baby ointment,  urea 40 with aloe (found on amazon.com)    Shoe recommendations: (try 6pm.com, zappos.com , Cyrba.enrich-in, or shoes.enrich-in for discounted prices) you can visit DSW shoes in [place]  or [facility] in the [facility] (there are also several shoe brand outlets in the [facility])    Asics (GT 2000 or gel foundations), new balance stability type shoes (such as the 940 series), saucony (stabil c3),  Euceda (GTS or Beast or transcend),  propet (tennis shoe)    Sofft Brand, baretraps (women) Arias&Carlos (men), clarks, crocs, aerosoles, naturalizers, SAS, ecco, born, rolly sepulveda, rockports (dress shoes)    Vionic, burkenstocks, fitflops, propet, baretraps (sandals)    Nike comfort thong sandals, crocs, propet (house shoes)    Nail Home remedy:  Vicks Vapor rub or Emuaid to nails for easier manageability    Occasional soaks for 15-20 mins in luke warm water with 1/2 cup of listerine and 1 cup of apple cider vinegar are ok You may add several drops of oil of oregano or tea tree oil as well    Understanding Plantar Fasciitis    Plantar fasciitis is a condition that causes foot and heel pain. The plantar fascia is a tough band of tissue that runs across the bottom of the foot from the heel to the toes. This tissue pulls on the heel bone. It supports the arch of the foot as it pushes off the ground. If the tissue becomes irritated or red and swollen (inflamed), it is called plantar fasciitis.  How to say it  PLAN-tuhr fa-see-IY-tis   What causes plantar fasciitis?  Plantar fasciitis most often occurs from overusing the plantar fascia. The tissue may become damaged from activities that put repeated stress on the heel and foot. Or it may wear down over time with age and ankle stiffness. You are more likely to have plantar fasciitis if you:  · Do activities that require a lot of running, jumping, or dancing  · Have a job that requires being on your feet for long periods  · Are overweight or obese  · Have certain foot problems, such as a tight Achilles tendon, flat feet, or high arches  · Often wear poorly fitting shoes  Symptoms of plantar fasciitis  The condition most often causes pain in the heel and the bottom of the foot. The pain may occur when you take your first steps in the morning. It may get better as you walk throughout the day. But as you continue to put weight on the foot, the pain often returns. Pain may also occur after standing or sitting  for long periods.  Treating plantar fasciitis  Treatments for plantar fasciitis include:  · Resting the foot. This involves limiting movements that make your foot hurt. You may also need to avoid certain sports and types of work for a time.  · Using cold packs. Put an ice pack on the heel and foot to help reduce pain and swelling.  · Taking pain medicines. Prescription and over-the-counter pain medicines can help relieve pain and swelling.  · Using heel cups or foot inserts (orthotics). These are placed in the shoes to help support the heel or arch and cushion the heel. You may also be told to buy proper-fitting shoes with good arch support and cushioned soles.  · Taping the foot. This supports the arch and limits the movement of the plantar fascia to help relieve symptoms.  · Wearing a night splint. This stretches the plantar fascia and leg muscles while you sleep. This may help relieve pain.  · Doing exercises and physical therapy. These stretch and strengthen the plantar fascia and the muscles in the leg that support the heel and foot.  · Getting shots of medicine into the foot. These may help relieve symptoms for a time.  · Having surgery. This may be needed if other treatments fail to relieve symptoms. During surgery, the surgeon may partially cut the plantar fascia to release tension.  Possible complications of plantar fasciitis  Without proper care and treatment, healing may take longer than normal. Also, symptoms may continue or get worse. Over time, the plantar fascia may be damaged. This can make it hard to walk or even stand without pain.  When to call your healthcare provider  Call your healthcare provider right away if you have any of these:  · Fever of 100.4°F (38°C) or higher, or as directed  · Symptoms that dont get better with treatment, or get worse  · New symptoms, such as numbness, tingling, or weakness in the foot   © 0115-8770 [employer]. [address] [place] [address], [place]  [address]. All rights reserved. This information is not intended as a substitute for professional medical care. Always follow your healthcare professional's instructions.        Treating Plantar Fasciitis  First, your healthcare provider tries to determine the cause of your problem in order to suggest ways to relieve pain. If your pain is due to poor foot mechanics, custom-made shoe inserts (orthoses) may help.    Reduce symptoms  · To relieve mild symptoms, try aspirin, ibuprofen, or other medicines as directed. Rubbing ice on the affected area may also help.  · To reduce severe pain and swelling, your healthcare provider may prescribe pills or injections or a walking cast in some instances. Physical therapy, such as ultrasound or a daily stretching program, may also be recommended. Surgery is rarely required.  · To reduce symptoms caused by poor foot mechanics, your foot may be taped. This supports the arch and temporarily controls movement. Night splints may also help by stretching the fascia.  Control movement  If taping helps, your healthcare provider may prescribe orthoses. Built from plaster casts of your feet, these inserts control the way your foot moves. As a result, your symptoms should go away.  Reduce overuse  Every time your foot strikes the ground, the plantar fascia is stretched. You can reduce the strain on the plantar fascia and the possibility of overuse by following these suggestions:  · Lose any excess weight.  · Avoid running on hard or uneven ground.  · Use orthoses at all times in your shoes and house slippers.  If surgery is needed  Your healthcare provider may consider surgery if other types of treatment don't control your pain. During surgery, the plantar fascia is partially cut to release tension. As you heal, fibrous tissue fills the space between the heel bone and the plantar fascia.   © 2822-9597 [employer]. [address] [place] [address], [place] [address]. All rights reserved.  This information is not intended as a substitute for professional medical care. Always follow your healthcare professional's instructions.        Wearing Proper Shoes                    You walk on your feet every day, forcing them to support the weight of your body. Repeated stress on your feet can cause damage over time. The right shoes can help protect your feet. The wrong shoes can cause more foot problems. Read the information below to help you find a shoe that fits your foot needs.     A good shoe fit will cover your foot outline.       A shoe that doesnt cover the outline is a bad fit.      Whats Your Foot Shape?  To get a good fit, you need to know the shape of your foot. Do this simple test: While standing, place your foot on a piece of paper and trace around it. Is your foot straight or curved? Do you have a foot problem, such as a bunion, that causes your foot outline to show a bulge on the side of your big toe?  Finding Your Fit  Bring your foot outline to the [employer] store to help you find the right shoe. Place a shoe you like on top of the outline to see if it matches the shape. The shoe should cover the outline. (If you have a bunion, the shoe may not cover the bulge on the outline. Look for soft leather shoes to stretch over the bunion.) Once youve found a pair of proper shoes, put them on. Walk around. Be sure the shoes dont rub or pinch. If the shoes feel good, youve found your fit!  The Right Shoe for You  A good shoe has features that provide comfort and support. It must also be the right size and shape for your feet. Look for a shoe made of breathable fabric and lining, such as leather or canvas. Be sure that shoes have enough tread to prevent slipping. Go to a good shoe store for help finding the right shoe.  Good Shoe Features  An ideal shoe has the following:  · Laces for support. If tying laces is a problem for you, try shoes with Velcro fasteners or halley.  · A front of the shoe (toe  box) with ½ inch space in front of your longest toes.  · An arch shape that supports your foot.  · No more than 1½ inches of heel.  · A stiff, snug back of the shoe to keep your foot from sliding around.  · A smooth lining with no rough seams.  Shoe Shopping Tips  Below are some dos and donts for when you go to the shoe store.  Do:  · Select the shoes that feel right. Wear them around the house. Then bring them to your foot doctor to check for fit. If they dont fit well, return them.  · Shop late in the day, when your feet will be slightly bigger.  · Each time you buy shoes, have both your feet measured while you are standing. Foot size changes with time.  · Pick shoes to suit their purpose. High heels are okay for an occasional night on the town. But for everyday wear, choose a more sensible shoe.  · Try on shoes while wearing any inserts specially made for your feet (orthoses).  · Try on both the right and left shoes. If your feet are different sizes, pick a pair that fits the larger foot.  Dont:  · Dont buy shoes based on shoe size alone. Always try on shoes, as sizes differ from brand to brand and within brands.  · Dont expect shoes to break in. If they dont fit at the store, dont buy them.  · Dont buy a shoe that doesnt match your foot shape.  What About Socks?  Always wear socks with shoes. Socks help absorb sweat and reduce friction and blistering. When shopping for shoes, choose soft, padded socks with seams that dont irritate your feet.  If You Have Foot Problems  Some foot problems cause deformities. This can make it hard to find a good fit. Look for shoes made of soft leather to stretch over the deformity. If you have bunions, buy shoes with a wider toe box. To fit hammertoes, look for shoes with a tall toe box. If you have arch problems, you may need inserts. In some cases, youll need to have custom footwear or orthoses made for your feet.  Suggested Footwear  Ask your healthcare provider  what kind of footwear you need. He or she may recommend a certain brand or shoe store.  © 0716-4396 [employer]. [address] [place] [address]. All rights reserved. This information is not intended as a substitute for professional medical care. Always follow your healthcare professional's instructions.        Toe Extension (Flexibility)    These instructions are for your right foot. Switch sides for your left foot.  5. Sit in a chair. Rest your right ankle on your left knee.  6. Hold your toes with your right hand. Gently bend the toes backward. Feel a stretch in the undersides of the toes and ball of the foot. Hold for 30 to 60 seconds.  7. Then gently bend the toes in the other direction. Gently press on them until your foot is pointed. Hold for 30 to 60 seconds.  8. Repeat 5 times, or as instructed.  © 9329-3348 [employer]. [address] [place] [address]. All rights reserved. This information is not intended as a substitute for professional medical care. Always follow your healthcare professional's instructions.

## 2020-11-27 ENCOUNTER — PATIENT MESSAGE (OUTPATIENT)
Dept: FAMILY MEDICINE | Facility: CLINIC | Age: 40
End: 2020-11-27

## 2020-11-27 DIAGNOSIS — E11.65 UNCONTROLLED TYPE 2 DIABETES MELLITUS WITH HYPERGLYCEMIA: Primary | ICD-10-CM

## 2020-11-27 RX ORDER — DULAGLUTIDE 1.5 MG/.5ML
1.5 INJECTION, SOLUTION SUBCUTANEOUS WEEKLY
Qty: 2 ML | Refills: 0 | Status: SHIPPED | OUTPATIENT
Start: 2020-11-27 | End: 2020-12-29

## 2020-12-01 ENCOUNTER — PATIENT MESSAGE (OUTPATIENT)
Dept: FAMILY MEDICINE | Facility: CLINIC | Age: 40
End: 2020-12-01

## 2020-12-01 DIAGNOSIS — R59.0 ANTERIOR CERVICAL LYMPHADENOPATHY: Primary | ICD-10-CM

## 2020-12-02 ENCOUNTER — PATIENT MESSAGE (OUTPATIENT)
Dept: FAMILY MEDICINE | Facility: CLINIC | Age: 40
End: 2020-12-02

## 2020-12-02 DIAGNOSIS — R59.0 ANTERIOR CERVICAL LYMPHADENOPATHY: Primary | ICD-10-CM

## 2020-12-03 ENCOUNTER — PATIENT MESSAGE (OUTPATIENT)
Dept: FAMILY MEDICINE | Facility: CLINIC | Age: 40
End: 2020-12-03

## 2020-12-04 ENCOUNTER — LAB VISIT (OUTPATIENT)
Dept: LAB | Facility: HOSPITAL | Age: 40
End: 2020-12-04
Attending: INTERNAL MEDICINE
Payer: COMMERCIAL

## 2020-12-04 DIAGNOSIS — R59.0 ANTERIOR CERVICAL LYMPHADENOPATHY: ICD-10-CM

## 2020-12-04 LAB
ANION GAP SERPL CALC-SCNC: 10 MMOL/L (ref 8–16)
BASOPHILS # BLD AUTO: 0.05 K/UL (ref 0–0.2)
BASOPHILS NFR BLD: 0.6 % (ref 0–1.9)
BUN SERPL-MCNC: 16 MG/DL (ref 6–20)
CALCIUM SERPL-MCNC: 9.7 MG/DL (ref 8.7–10.5)
CHLORIDE SERPL-SCNC: 103 MMOL/L (ref 95–110)
CO2 SERPL-SCNC: 28 MMOL/L (ref 23–29)
CREAT SERPL-MCNC: 0.7 MG/DL (ref 0.5–1.4)
DIFFERENTIAL METHOD: NORMAL
EOSINOPHIL # BLD AUTO: 0.1 K/UL (ref 0–0.5)
EOSINOPHIL NFR BLD: 1.4 % (ref 0–8)
ERYTHROCYTE [DISTWIDTH] IN BLOOD BY AUTOMATED COUNT: 12.6 % (ref 11.5–14.5)
EST. GFR  (AFRICAN AMERICAN): >60 ML/MIN/1.73 M^2
EST. GFR  (NON AFRICAN AMERICAN): >60 ML/MIN/1.73 M^2
GLUCOSE SERPL-MCNC: 148 MG/DL (ref 70–110)
HCT VFR BLD AUTO: 44 % (ref 37–48.5)
HGB BLD-MCNC: 14.1 G/DL (ref 12–16)
IMM GRANULOCYTES # BLD AUTO: 0.03 K/UL (ref 0–0.04)
IMM GRANULOCYTES NFR BLD AUTO: 0.4 % (ref 0–0.5)
LYMPHOCYTES # BLD AUTO: 2.2 K/UL (ref 1–4.8)
LYMPHOCYTES NFR BLD: 27.7 % (ref 18–48)
MCH RBC QN AUTO: 27.9 PG (ref 27–31)
MCHC RBC AUTO-ENTMCNC: 32 G/DL (ref 32–36)
MCV RBC AUTO: 87 FL (ref 82–98)
MONOCYTES # BLD AUTO: 0.5 K/UL (ref 0.3–1)
MONOCYTES NFR BLD: 6.4 % (ref 4–15)
NEUTROPHILS # BLD AUTO: 5.1 K/UL (ref 1.8–7.7)
NEUTROPHILS NFR BLD: 63.5 % (ref 38–73)
NRBC BLD-RTO: 0 /100 WBC
PLATELET # BLD AUTO: 290 K/UL (ref 150–350)
PMV BLD AUTO: 9.4 FL (ref 9.2–12.9)
POTASSIUM SERPL-SCNC: 5.1 MMOL/L (ref 3.5–5.1)
RBC # BLD AUTO: 5.05 M/UL (ref 4–5.4)
SODIUM SERPL-SCNC: 141 MMOL/L (ref 136–145)
WBC # BLD AUTO: 8.09 K/UL (ref 3.9–12.7)

## 2020-12-04 PROCEDURE — 85025 COMPLETE CBC W/AUTO DIFF WBC: CPT

## 2020-12-04 PROCEDURE — 80048 BASIC METABOLIC PNL TOTAL CA: CPT

## 2020-12-04 PROCEDURE — 36415 COLL VENOUS BLD VENIPUNCTURE: CPT

## 2020-12-05 NOTE — PROGRESS NOTES
CBC, BMP normal.  Done for complaints of neck lymphadenopathy.  Results to patient.  Ordered CT soft tissue of the neck with IV contrast

## 2020-12-08 ENCOUNTER — HOSPITAL ENCOUNTER (OUTPATIENT)
Dept: RADIOLOGY | Facility: HOSPITAL | Age: 40
Discharge: HOME | End: 2020-12-08
Attending: INTERNAL MEDICINE
Payer: COMMERCIAL

## 2020-12-08 ENCOUNTER — PATIENT MESSAGE (OUTPATIENT)
Dept: FAMILY MEDICINE | Facility: CLINIC | Age: 40
End: 2020-12-08

## 2020-12-08 DIAGNOSIS — R59.0 ANTERIOR CERVICAL LYMPHADENOPATHY: ICD-10-CM

## 2020-12-08 PROCEDURE — 70491 CT SOFT TISSUE NECK W/DYE: CPT | Mod: 26,,, | Performed by: RADIOLOGY

## 2020-12-08 PROCEDURE — 70491 CT SOFT TISSUE NECK WITH CONTRAST: ICD-10-PCS | Mod: 26,,, | Performed by: RADIOLOGY

## 2020-12-08 PROCEDURE — 25500020 PHARM REV CODE 255: Performed by: INTERNAL MEDICINE

## 2020-12-08 PROCEDURE — 70491 CT SOFT TISSUE NECK W/DYE: CPT | Mod: TC

## 2020-12-08 RX ADMIN — IOHEXOL 75 ML: 350 INJECTION, SOLUTION INTRAVENOUS at 04:12

## 2020-12-09 NOTE — PROGRESS NOTES
1. Few subcentimeter scattered cervical lymph nodes, likely representing reactive adenopathy.  2. CT scan of the neck otherwise is unremarkable.    Results to pt via mychart. No further testing

## 2020-12-15 ENCOUNTER — CLINICAL SUPPORT (OUTPATIENT)
Dept: REHABILITATION | Facility: HOSPITAL | Age: 40
End: 2020-12-15
Attending: PODIATRIST
Payer: COMMERCIAL

## 2020-12-15 DIAGNOSIS — M25.674 DECREASED ROM OF RIGHT FOOT: ICD-10-CM

## 2020-12-15 DIAGNOSIS — R29.3 POSTURAL IMBALANCE: ICD-10-CM

## 2020-12-15 DIAGNOSIS — M72.2 PLANTAR FASCIITIS: ICD-10-CM

## 2020-12-15 DIAGNOSIS — R53.1 DECREASED STRENGTH: ICD-10-CM

## 2020-12-15 PROCEDURE — 97110 THERAPEUTIC EXERCISES: CPT | Mod: PN

## 2020-12-15 PROCEDURE — 97161 PT EVAL LOW COMPLEX 20 MIN: CPT | Mod: PN

## 2020-12-15 NOTE — PROGRESS NOTES
"See Plan of Care for complete Physical Therapy Evaluation.                                                       Physical Therapy Initial Evaluation     Name: Tonya [facility] Number: [id number]    Therapy Diagnosis:   Encounter Diagnoses   Name Primary?    Plantar fasciitis     Decreased ROM of right foot     Decreased strength     Postural imbalance      Physician: Basilia May DPM    Physician Orders: PT Eval and Treat   Medical Diagnosis from Referral: M72.2 (ICD-10-CM) - Plantar fasciitis  Evaluation Date: 12/15/2020  Authorization Period Expiration: 12/31/2020  Plan of Care Expiration: 3/15/2021  Visit # / Visits authorized: 1/20    Time In: 1637  Time Out: 1715  Total Billable Time:  38 minutes    Precautions: Standard        Treatment Time In: 1700  Treatment Time Out: 1715  Total Treatment time separate from Evaluation: 15 minutes    Tonya received therapeutic exercises to develop strength, endurance, ROM and flexibility for 15 minutes including:    Plantar fascia towel stretch 30"x3  Doming 10"x10  Toe Yoga 10x  Heel Raises 20x  Toe Raises 20x    NV: upright bike, gastroc/soleus str, SLS    Home Exercises and Patient Education Provided    Education provided:   - HEP  -POC    Written Home Exercises Provided: yes.  Exercises were reviewed and Tonya was able to demonstrate them prior to the end of the session.  Tonya demonstrated good  understanding of the education provided.       Short Term GOALS: 6 weeks. Pt agrees with goals set.  1. Patient demonstrates independence with HEP.   2. Patient demonstrates independence with Postural Awareness.   3. Patient demonstrates independence with body mechanics.   4. Patient demonstrates increased R ankle DF by 5 degrees to improve tolerance to functional activities pain free.    5. Patient demonstrates ability to walk 30 minutes on grass without increase in pain    Long Term GOALS: 12 weeks. Pt agrees with goals set.  1. Patient demonstrates " increased R ankle ROM to WNL to improve tolerance to functional activities pain free.   2. Patient demonstrates increased strength BLE's to 4+/5 or greater to improve tolerance to functional activities pain free.   3. Patient demonstrates ability to complete her usual work or house work without difficulty or increase in pain.  4. Patient demonstrates ability to walk 1 mile without increase in pain or gait deficits    PLAN     Plan of care Certification: 12/15/2020 to 3/15/2021.    Outpatient Physical Therapy 1-2 times weekly for 12 weeks to include the following interventions: Manual Therapy, Moist Heat/ Ice, Neuromuscular Re-ed, Patient Education, Therapeutic Activites, Therapeutic Exercise and Dry Needling.  Pt may be seen by PTA as part of the rehabilitation team.     Romana Avelar, PT  12/15/2020

## 2020-12-15 NOTE — PLAN OF CARE
"                                                    Physical Therapy Initial Evaluation     Name: Tonya Rivas  Clinic Number: [id number]    Therapy Diagnosis:   Encounter Diagnoses   Name Primary?   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    Medications:   Tonya has a current medication list which includes the following prescription(s): aspirin, augmented betamethasone dipropionate, blood sugar diagnostic, clotrimazole-betamethasone 1-0.05%, fluconazole, jardiance, metformin, rivaroxaban, and trulicity.    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 She did the supportive insoles and stretches and the pain went away. But about a few months ago it came back. She just had an " injection in her right foot and she is feeling better but she wants to be able to stop the injections and avoid surgery. The pain was at its worst with her first few steps. Then if she would walk and stop to sit it would start to hurt as soon as she stood again. She did not get the injection on the left and has been able to maintain her decrease in pain with just her stretches and insoles. She used a splint at night but she had increase and soreness and did not use it for long.    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  Prior Level of Function: independent  DME owned/used: none  Current Level of Function: pain with standing and walking    Pain:  Current 0/10, worst 9/10, best 3/10   Location: bilateral plantar surface of feet (R>L)   Description: Burning and Sharp  Aggravating Factors: Standing and Walking  Easing Factors: Rest, stretches, ice    Pts goals: decrease the pain, be able to walk, return to working out (minimum of 30 minutes every other day)    Objective     Observation: Pes Planus R>L    Range of Motion: Ankle    Left Right   Dorsiflexion: 10 5   Plantarflexion 45 50   Inversion 25 20   Eversion 35 40     Strength: Ankle    Left Right   Gastrocnemius 4+/5 4+/5   Soleus 4+/5 4+/5   Dorsiflexion 4+/5 4+/5   Inversion 4+/5 4/5   Eversion 4+/5 4+/5     Strength: Knee   Left Right   Quadriceps 5/5 5/5   Hamstrings 4+/5 4+/5       Strength: Hip    Left Right   Iliopsoas 4/5 4/5   IR 4+/5 4+/5   ER 4-/5 4-/5     Joint Mobility: hypomobile right talocrural joint  Palpation: no TTP noted  Sensation: intact to light touch    Edema:  Left: absent  Right: absent    Gait: Rivas ambulated 50 feet with none.  Level of Assistance: independent  Patient displays increase ER RLE.     CMS Impairment/Limitation/Restriction for FOTO Ankle Survey    Therapist reviewed FOTO scores for Tonya Rivas on 12/15/2020.   FOTO documents entered  "into EPIC - see Media section.    Limitation Score: 21%       TREATMENT     Treatment Time In: 1700  Treatment Time Out: 1715  Total Treatment time separate from Evaluation: 15 minutes    Tonya received therapeutic exercises to develop strength, endurance, ROM and flexibility for 15 minutes including:    Plantar fascia towel stretch 30"x3  Doming 10"x10  Toe Yoga 10x  Heel Raises 20x  Toe Raises 20x    NV: upright bike, gastroc/soleus str, SLS    Home Exercises and Patient Education Provided    Education provided:   - HEP  -POC    Written Home Exercises Provided: yes.  Exercises were reviewed and Tonya was able to demonstrate them prior to the end of the session.  Tonya demonstrated good  understanding of the education provided.     See EMR under Patient Instructions for exercises provided 12/15/2020.    ASSESSMENT     Tonya is a 40 y.o. female referred to outpatient Physical Therapy with a medical diagnosis of plantar fasciitis. with signs and symptoms including:: increased foot pain, decreased ankle ROM, decreased LE Strength, soft tissue dysfunction, postural imbalance,impaired joint mobility, and decreased tolerance to functional activities. Pt's signs and symptoms are consistent with plantar fasciitis. However, pt's irritability was low 2/2 injection in right foot a few weeks ago. She presents with decrease DF of RLE and increase in ER during gait to compensate.   Pt with good motivation to perform physical activity and responds well to cueing.  Pt prognosis is Good.   Pt will benefit from skilled outpatient Physical Therapy to address the deficits stated above and in the chart below, provide pt/family education, and to maximize pt's level of independence.     Plan of care discussed with patient: Yes  Pt's spiritual, cultural and educational needs considered and patient is agreeable to the plan of care and goals as stated below:     Anticipated Barriers for therapy: none      Medical Necessity is " demonstrated by the following  History  Co-morbidities and personal factors that may impact the plan of care Co-morbidities:   diabetes and high BMI    Personal Factors:   age  social background  lifestyle     moderate   Examination  Body Structures and Functions, activity limitations and participation restrictions that may impact the plan of care Body Regions:   lower extremities    Body Systems:    gross symmetry  ROM  strength  gross coordinated movement  balance  gait  transfers  transitions  motor control  motor learning    Participation Restrictions:   Difficulty walking for prolonged periods of time    Activity limitations:   Learning and applying knowledge  no deficits    General Tasks and Commands  no deficits    Communication  no deficits    Mobility  walking    Self care  washing oneself (bathing, drying, washing hands)  caring for body parts (brushing teeth, shaving, grooming)  toileting  dressing    Domestic Life  shopping  cooking  doing house work (cleaning house, washing dishes, laundry)  assisting others    Interactions/Relationships  No deficits    Life Areas  No deficits    Community and Social Life  No deficits         moderate   Clinical Presentation stable and uncomplicated low   Decision Making/ Complexity Score: low     Pt's spiritual, cultural and educational needs considered and pt agreeable to plan of care and goals as stated below:       Short Term GOALS: 6 weeks. Pt agrees with goals set.  1. Patient demonstrates independence with HEP.   2. Patient demonstrates independence with Postural Awareness.   3. Patient demonstrates independence with body mechanics.   4. Patient demonstrates increased R ankle DF by 5 degrees to improve tolerance to functional activities pain free.    5. Patient demonstrates ability to walk 30 minutes on grass without increase in pain    Long Term GOALS: 12 weeks. Pt agrees with goals set.  1. Patient demonstrates increased R ankle ROM to WNL to improve tolerance  to functional activities pain free.   2. Patient demonstrates increased strength BLE's to 4+/5 or greater to improve tolerance to functional activities pain free.   3. Patient demonstrates ability to complete her usual work or house work without difficulty or increase in pain.  4. Patient demonstrates ability to walk 1 mile without increase in pain or gait deficits    PLAN     Plan of care Certification: 12/15/2020 to 3/15/2021.    Outpatient Physical Therapy 1-2 times weekly for 12 weeks to include the following interventions: Manual Therapy, Moist Heat/ Ice, Neuromuscular Re-ed, Patient Education, Therapeutic Activites, Therapeutic Exercise and Dry Needling.  Pt may be seen by PTA as part of the rehabilitation team.     Romana Avelar, PT  12/15/2020    I have seen the patient, reviewed the therapist's plan of care, and I agree with the plan of care.      I certify the need for these services furnished under this plan of treatment and while under my care.     ___________________ ________ Physician/Referring Practitioner            ___________________________ Date of Signature

## 2020-12-23 ENCOUNTER — OFFICE VISIT (OUTPATIENT)
Dept: OTOLARYNGOLOGY | Facility: CLINIC | Age: 40
End: 2020-12-23
Payer: COMMERCIAL

## 2020-12-23 ENCOUNTER — PATIENT OUTREACH (OUTPATIENT)
Dept: ADMINISTRATIVE | Facility: OTHER | Age: 40
End: 2020-12-23

## 2020-12-23 VITALS
WEIGHT: 201.06 LBS | TEMPERATURE: 98 F | DIASTOLIC BLOOD PRESSURE: 70 MMHG | SYSTOLIC BLOOD PRESSURE: 122 MMHG | BODY MASS INDEX: 30.47 KG/M2 | HEIGHT: 68 IN

## 2020-12-23 DIAGNOSIS — R22.0 LOCALIZED SWELLING, MASS AND LUMP, HEAD: Primary | ICD-10-CM

## 2020-12-23 PROCEDURE — 99203 OFFICE O/P NEW LOW 30 MIN: CPT | Mod: S$GLB,,, | Performed by: OTOLARYNGOLOGY

## 2020-12-23 PROCEDURE — 99203 PR OFFICE/OUTPT VISIT, NEW, LEVL III, 30-44 MIN: ICD-10-PCS | Mod: S$GLB,,, | Performed by: OTOLARYNGOLOGY

## 2021-01-06 DIAGNOSIS — Z12.31 OTHER SCREENING MAMMOGRAM: ICD-10-CM

## 2021-01-16 ENCOUNTER — OFFICE VISIT (OUTPATIENT)
Dept: FAMILY MEDICINE | Facility: CLINIC | Age: 41
End: 2021-01-16
Payer: COMMERCIAL

## 2021-01-16 VITALS
HEIGHT: 68 IN | SYSTOLIC BLOOD PRESSURE: 128 MMHG | TEMPERATURE: 99 F | OXYGEN SATURATION: 99 % | HEART RATE: 84 BPM | WEIGHT: 200.63 LBS | BODY MASS INDEX: 30.41 KG/M2 | DIASTOLIC BLOOD PRESSURE: 88 MMHG

## 2021-01-16 DIAGNOSIS — E11.65 UNCONTROLLED TYPE 2 DIABETES MELLITUS WITH HYPERGLYCEMIA: Primary | ICD-10-CM

## 2021-01-16 DIAGNOSIS — Z01.818 PREOPERATIVE CLEARANCE: ICD-10-CM

## 2021-01-16 DIAGNOSIS — R06.83 SNORING: ICD-10-CM

## 2021-01-16 PROCEDURE — 99214 OFFICE O/P EST MOD 30 MIN: CPT | Mod: S$GLB,,, | Performed by: FAMILY MEDICINE

## 2021-01-16 PROCEDURE — 99214 PR OFFICE/OUTPT VISIT, EST, LEVL IV, 30-39 MIN: ICD-10-PCS | Mod: S$GLB,,, | Performed by: FAMILY MEDICINE

## 2021-01-16 PROCEDURE — 99999 PR PBB SHADOW E&M-EST. PATIENT-LVL III: ICD-10-PCS | Mod: PBBFAC,,, | Performed by: FAMILY MEDICINE

## 2021-01-16 PROCEDURE — 99999 PR PBB SHADOW E&M-EST. PATIENT-LVL III: CPT | Mod: PBBFAC,,, | Performed by: FAMILY MEDICINE

## 2021-01-25 ENCOUNTER — PATIENT MESSAGE (OUTPATIENT)
Dept: FAMILY MEDICINE | Facility: CLINIC | Age: 41
End: 2021-01-25

## 2021-01-26 ENCOUNTER — TELEPHONE (OUTPATIENT)
Dept: FAMILY MEDICINE | Facility: CLINIC | Age: 41
End: 2021-01-26

## 2021-02-01 ENCOUNTER — TELEPHONE (OUTPATIENT)
Dept: FAMILY MEDICINE | Facility: CLINIC | Age: 41
End: 2021-02-01

## 2021-02-01 ENCOUNTER — PATIENT MESSAGE (OUTPATIENT)
Dept: FAMILY MEDICINE | Facility: CLINIC | Age: 41
End: 2021-02-01

## 2021-02-01 ENCOUNTER — HOSPITAL ENCOUNTER (OUTPATIENT)
Dept: CARDIOLOGY | Facility: HOSPITAL | Age: 41
Discharge: HOME | End: 2021-02-01
Attending: FAMILY MEDICINE
Payer: COMMERCIAL

## 2021-02-01 DIAGNOSIS — Z01.818 PREOPERATIVE CLEARANCE: ICD-10-CM

## 2021-02-01 PROCEDURE — 93010 ELECTROCARDIOGRAM REPORT: CPT | Mod: ,,, | Performed by: INTERNAL MEDICINE

## 2021-02-01 PROCEDURE — 93010 EKG 12-LEAD: ICD-10-PCS | Mod: ,,, | Performed by: INTERNAL MEDICINE

## 2021-02-01 PROCEDURE — 93005 ELECTROCARDIOGRAM TRACING: CPT

## 2021-02-03 ENCOUNTER — PATIENT MESSAGE (OUTPATIENT)
Dept: FAMILY MEDICINE | Facility: CLINIC | Age: 41
End: 2021-02-03

## 2021-02-04 RX ORDER — NORGESTIMATE AND ETHINYL ESTRADIOL 7DAYSX3 LO
1 KIT ORAL DAILY
Qty: 30 TABLET | Refills: 11 | Status: SHIPPED | OUTPATIENT
Start: 2021-02-04 | End: 2021-06-10

## 2021-02-05 ENCOUNTER — HOSPITAL ENCOUNTER (OUTPATIENT)
Dept: RADIOLOGY | Facility: HOSPITAL | Age: 41
Discharge: HOME | End: 2021-02-05
Attending: INTERNAL MEDICINE
Payer: COMMERCIAL

## 2021-02-05 DIAGNOSIS — Z12.31 OTHER SCREENING MAMMOGRAM: ICD-10-CM

## 2021-02-05 PROCEDURE — 77063 BREAST TOMOSYNTHESIS BI: CPT | Mod: 26,,, | Performed by: RADIOLOGY

## 2021-02-05 PROCEDURE — 77063 MAMMO DIGITAL SCREENING BILAT WITH TOMO: ICD-10-PCS | Mod: 26,,, | Performed by: RADIOLOGY

## 2021-02-05 PROCEDURE — 77067 SCR MAMMO BI INCL CAD: CPT | Mod: 26,,, | Performed by: RADIOLOGY

## 2021-02-05 PROCEDURE — 77067 MAMMO DIGITAL SCREENING BILAT WITH TOMO: ICD-10-PCS | Mod: 26,,, | Performed by: RADIOLOGY

## 2021-02-05 PROCEDURE — 77067 SCR MAMMO BI INCL CAD: CPT | Mod: TC

## 2021-02-08 ENCOUNTER — LAB VISIT (OUTPATIENT)
Dept: FAMILY MEDICINE | Facility: CLINIC | Age: 41
End: 2021-02-08
Payer: COMMERCIAL

## 2021-02-08 DIAGNOSIS — Z01.818 PRE-OP TESTING: Primary | ICD-10-CM

## 2021-02-08 DIAGNOSIS — Z01.818 PRE-OP TESTING: ICD-10-CM

## 2021-02-08 LAB — SARS-COV-2 RNA RESP QL NAA+PROBE: NOT DETECTED

## 2021-02-08 PROCEDURE — U0005 INFEC AGEN DETEC AMPLI PROBE: HCPCS

## 2021-02-08 PROCEDURE — U0003 INFECTIOUS AGENT DETECTION BY NUCLEIC ACID (DNA OR RNA); SEVERE ACUTE RESPIRATORY SYNDROME CORONAVIRUS 2 (SARS-COV-2) (CORONAVIRUS DISEASE [COVID-19]), AMPLIFIED PROBE TECHNIQUE, MAKING USE OF HIGH THROUGHPUT TECHNOLOGIES AS DESCRIBED BY CMS-2020-01-R: HCPCS

## 2021-02-10 ENCOUNTER — ANESTHESIA EVENT (OUTPATIENT)
Dept: SURGERY | Facility: AMBULARY SURGERY CENTER | Age: 41
End: 2021-02-10

## 2021-02-11 ENCOUNTER — ANESTHESIA (OUTPATIENT)
Dept: SURGERY | Facility: AMBULARY SURGERY CENTER | Age: 41
End: 2021-02-11

## 2021-02-11 ENCOUNTER — HOSPITAL ENCOUNTER (OUTPATIENT)
Facility: AMBULARY SURGERY CENTER | Age: 41
Discharge: HOME | End: 2021-02-11
Attending: SPECIALIST | Admitting: SPECIALIST

## 2021-02-11 DIAGNOSIS — E65 LOCALIZED ADIPOSITY: ICD-10-CM

## 2021-02-11 LAB
B-HCG UR QL: NEGATIVE
CTP QC/QA: YES
POCT GLUCOSE: 214 MG/DL (ref 70–110)
POCT GLUCOSE: 222 MG/DL (ref 70–110)

## 2021-02-11 PROCEDURE — 63600175 PHARM REV CODE 636 W HCPCS: Performed by: SPECIALIST

## 2021-02-11 PROCEDURE — D9220A PRA ANESTHESIA: Mod: ,,, | Performed by: ANESTHESIOLOGY

## 2021-02-11 PROCEDURE — 15830 EXC EXCESSIVE SKIN ABDOMEN: CPT | Performed by: SPECIALIST

## 2021-02-11 PROCEDURE — C9290 INJ, BUPIVACAINE LIPOSOME: HCPCS | Performed by: SPECIALIST

## 2021-02-11 PROCEDURE — D9220A PRA ANESTHESIA: ICD-10-PCS | Mod: ,,, | Performed by: ANESTHESIOLOGY

## 2021-02-11 RX ORDER — LIDOCAINE HYDROCHLORIDE 10 MG/ML
1 INJECTION, SOLUTION EPIDURAL; INFILTRATION; INTRACAUDAL; PERINEURAL ONCE
Status: COMPLETED | OUTPATIENT
Start: 2021-02-11 | End: 2021-02-11

## 2021-02-11 RX ORDER — NEOSTIGMINE METHYLSULFATE 1 MG/ML
INJECTION, SOLUTION INTRAVENOUS
Status: DISCONTINUED | OUTPATIENT
Start: 2021-02-11 | End: 2021-02-11

## 2021-02-11 RX ORDER — OXYCODONE HYDROCHLORIDE 5 MG/1
5 TABLET ORAL
Status: DISCONTINUED | OUTPATIENT
Start: 2021-02-11 | End: 2021-02-11 | Stop reason: HOSPADM

## 2021-02-11 RX ORDER — SODIUM CHLORIDE 0.9 % (FLUSH) 0.9 %
3 SYRINGE (ML) INJECTION EVERY 8 HOURS
Status: DISCONTINUED | OUTPATIENT
Start: 2021-02-11 | End: 2021-02-11 | Stop reason: HOSPADM

## 2021-02-11 RX ORDER — FENTANYL CITRATE 50 UG/ML
INJECTION, SOLUTION INTRAMUSCULAR; INTRAVENOUS
Status: DISCONTINUED | OUTPATIENT
Start: 2021-02-11 | End: 2021-02-11

## 2021-02-11 RX ORDER — ACETAMINOPHEN 10 MG/ML
INJECTION, SOLUTION INTRAVENOUS
Status: DISCONTINUED | OUTPATIENT
Start: 2021-02-11 | End: 2021-02-11

## 2021-02-11 RX ORDER — FENTANYL CITRATE 50 UG/ML
25 INJECTION, SOLUTION INTRAMUSCULAR; INTRAVENOUS EVERY 5 MIN PRN
Status: COMPLETED | OUTPATIENT
Start: 2021-02-11 | End: 2021-02-11

## 2021-02-11 RX ORDER — DEXAMETHASONE SODIUM PHOSPHATE 4 MG/ML
INJECTION, SOLUTION INTRA-ARTICULAR; INTRALESIONAL; INTRAMUSCULAR; INTRAVENOUS; SOFT TISSUE
Status: DISCONTINUED | OUTPATIENT
Start: 2021-02-11 | End: 2021-02-11

## 2021-02-11 RX ORDER — HYDROMORPHONE HYDROCHLORIDE 1 MG/ML
0.2 INJECTION, SOLUTION INTRAMUSCULAR; INTRAVENOUS; SUBCUTANEOUS EVERY 5 MIN PRN
Status: DISCONTINUED | OUTPATIENT
Start: 2021-02-11 | End: 2021-02-11 | Stop reason: HOSPADM

## 2021-02-11 RX ORDER — MIDAZOLAM HYDROCHLORIDE 1 MG/ML
INJECTION, SOLUTION INTRAMUSCULAR; INTRAVENOUS
Status: DISCONTINUED | OUTPATIENT
Start: 2021-02-11 | End: 2021-02-11

## 2021-02-11 RX ORDER — BUPIVACAINE HYDROCHLORIDE 5 MG/ML
INJECTION, SOLUTION EPIDURAL; INTRACAUDAL
Status: DISCONTINUED | OUTPATIENT
Start: 2021-02-11 | End: 2021-02-11 | Stop reason: HOSPADM

## 2021-02-11 RX ORDER — TRANEXAMIC ACID 100 MG/ML
INJECTION, SOLUTION INTRAVENOUS
Status: DISCONTINUED | OUTPATIENT
Start: 2021-02-11 | End: 2021-02-11

## 2021-02-11 RX ORDER — BUPIVACAINE HYDROCHLORIDE 5 MG/ML
INJECTION, SOLUTION EPIDURAL; INTRACAUDAL
Status: DISCONTINUED
Start: 2021-02-11 | End: 2021-02-11 | Stop reason: HOSPADM

## 2021-02-11 RX ORDER — LIDOCAINE HYDROCHLORIDE 20 MG/ML
INJECTION INTRAVENOUS
Status: DISCONTINUED | OUTPATIENT
Start: 2021-02-11 | End: 2021-02-11

## 2021-02-11 RX ORDER — PROPOFOL 10 MG/ML
VIAL (ML) INTRAVENOUS
Status: DISCONTINUED | OUTPATIENT
Start: 2021-02-11 | End: 2021-02-11

## 2021-02-11 RX ORDER — ROCURONIUM BROMIDE 10 MG/ML
INJECTION, SOLUTION INTRAVENOUS
Status: DISCONTINUED | OUTPATIENT
Start: 2021-02-11 | End: 2021-02-11

## 2021-02-11 RX ORDER — DIPHENHYDRAMINE HYDROCHLORIDE 50 MG/ML
25 INJECTION INTRAMUSCULAR; INTRAVENOUS EVERY 6 HOURS PRN
Status: DISCONTINUED | OUTPATIENT
Start: 2021-02-11 | End: 2021-02-11 | Stop reason: HOSPADM

## 2021-02-11 RX ORDER — PROMETHAZINE HYDROCHLORIDE 25 MG/ML
INJECTION, SOLUTION INTRAMUSCULAR; INTRAVENOUS
Status: DISCONTINUED
Start: 2021-02-11 | End: 2021-02-11 | Stop reason: HOSPADM

## 2021-02-11 RX ORDER — PHENYLEPHRINE HYDROCHLORIDE 10 MG/ML
INJECTION INTRAVENOUS
Status: DISCONTINUED | OUTPATIENT
Start: 2021-02-11 | End: 2021-02-11

## 2021-02-11 RX ORDER — EPINEPHRINE 1 MG/ML
INJECTION INTRAMUSCULAR; INTRAVENOUS; SUBCUTANEOUS
Status: DISCONTINUED | OUTPATIENT
Start: 2021-02-11 | End: 2021-02-11 | Stop reason: HOSPADM

## 2021-02-11 RX ORDER — ONDANSETRON 2 MG/ML
INJECTION INTRAMUSCULAR; INTRAVENOUS
Status: DISCONTINUED | OUTPATIENT
Start: 2021-02-11 | End: 2021-02-11

## 2021-02-11 RX ORDER — ONDANSETRON 2 MG/ML
4 INJECTION INTRAMUSCULAR; INTRAVENOUS DAILY PRN
Status: DISCONTINUED | OUTPATIENT
Start: 2021-02-11 | End: 2021-02-11 | Stop reason: HOSPADM

## 2021-02-11 RX ORDER — KETAMINE HYDROCHLORIDE 100 MG/ML
INJECTION, SOLUTION INTRAMUSCULAR; INTRAVENOUS
Status: DISCONTINUED | OUTPATIENT
Start: 2021-02-11 | End: 2021-02-11

## 2021-02-11 RX ORDER — SODIUM CHLORIDE 0.9 G/100ML
IRRIGANT IRRIGATION
Status: DISCONTINUED | OUTPATIENT
Start: 2021-02-11 | End: 2021-02-11 | Stop reason: HOSPADM

## 2021-02-11 RX ORDER — MEPERIDINE HYDROCHLORIDE 50 MG/ML
12.5 INJECTION INTRAMUSCULAR; INTRAVENOUS; SUBCUTANEOUS ONCE AS NEEDED
Status: DISCONTINUED | OUTPATIENT
Start: 2021-02-11 | End: 2021-02-11 | Stop reason: HOSPADM

## 2021-02-11 RX ORDER — SODIUM CHLORIDE, SODIUM LACTATE, POTASSIUM CHLORIDE, CALCIUM CHLORIDE 600; 310; 30; 20 MG/100ML; MG/100ML; MG/100ML; MG/100ML
INJECTION, SOLUTION INTRAVENOUS CONTINUOUS
Status: DISCONTINUED | OUTPATIENT
Start: 2021-02-11 | End: 2021-02-11 | Stop reason: HOSPADM

## 2021-02-11 RX ORDER — CEFAZOLIN SODIUM 1 G/3ML
2 INJECTION, POWDER, FOR SOLUTION INTRAMUSCULAR; INTRAVENOUS ONCE
Status: COMPLETED | OUTPATIENT
Start: 2021-02-11 | End: 2021-02-11

## 2021-02-11 RX ORDER — SODIUM CHLORIDE 0.9 % (FLUSH) 0.9 %
3 SYRINGE (ML) INJECTION
Status: DISCONTINUED | OUTPATIENT
Start: 2021-02-11 | End: 2021-02-11 | Stop reason: HOSPADM

## 2021-02-11 RX ORDER — BACITRACIN 50000 [IU]/1
INJECTION, POWDER, FOR SOLUTION INTRAMUSCULAR
Status: DISCONTINUED
Start: 2021-02-11 | End: 2021-02-11 | Stop reason: WASHOUT

## 2021-02-11 RX ADMIN — KETAMINE HYDROCHLORIDE 25 MG: 100 INJECTION, SOLUTION INTRAMUSCULAR; INTRAVENOUS at 12:02

## 2021-02-11 RX ADMIN — FENTANYL CITRATE 25 MCG: 50 INJECTION, SOLUTION INTRAMUSCULAR; INTRAVENOUS at 01:02

## 2021-02-11 RX ADMIN — ACETAMINOPHEN 1000 MG: 10 INJECTION, SOLUTION INTRAVENOUS at 09:02

## 2021-02-11 RX ADMIN — ROCURONIUM BROMIDE 40 MG: 10 INJECTION, SOLUTION INTRAVENOUS at 09:02

## 2021-02-11 RX ADMIN — NEOSTIGMINE METHYLSULFATE 3 MG: 1 INJECTION, SOLUTION INTRAVENOUS at 12:02

## 2021-02-11 RX ADMIN — FENTANYL CITRATE 100 MCG: 50 INJECTION, SOLUTION INTRAMUSCULAR; INTRAVENOUS at 11:02

## 2021-02-11 RX ADMIN — KETAMINE HYDROCHLORIDE 25 MG: 100 INJECTION, SOLUTION INTRAMUSCULAR; INTRAVENOUS at 10:02

## 2021-02-11 RX ADMIN — ROCURONIUM BROMIDE 10 MG: 10 INJECTION, SOLUTION INTRAVENOUS at 11:02

## 2021-02-11 RX ADMIN — TRANEXAMIC ACID 900 MG: 100 INJECTION, SOLUTION INTRAVENOUS at 12:02

## 2021-02-11 RX ADMIN — FENTANYL CITRATE 50 MCG: 50 INJECTION, SOLUTION INTRAMUSCULAR; INTRAVENOUS at 11:02

## 2021-02-11 RX ADMIN — LIDOCAINE HYDROCHLORIDE 10 MG: 10 INJECTION, SOLUTION EPIDURAL; INFILTRATION; INTRACAUDAL; PERINEURAL at 08:02

## 2021-02-11 RX ADMIN — PHENYLEPHRINE HYDROCHLORIDE 100 MCG: 10 INJECTION INTRAVENOUS at 11:02

## 2021-02-11 RX ADMIN — MIDAZOLAM HYDROCHLORIDE 2 MG: 1 INJECTION, SOLUTION INTRAMUSCULAR; INTRAVENOUS at 09:02

## 2021-02-11 RX ADMIN — FENTANYL CITRATE 100 MCG: 50 INJECTION, SOLUTION INTRAMUSCULAR; INTRAVENOUS at 10:02

## 2021-02-11 RX ADMIN — Medication 150 MG: at 09:02

## 2021-02-11 RX ADMIN — ONDANSETRON 4 MG: 2 INJECTION INTRAMUSCULAR; INTRAVENOUS at 01:02

## 2021-02-11 RX ADMIN — CEFAZOLIN SODIUM 2 G: 1 INJECTION, POWDER, FOR SOLUTION INTRAMUSCULAR; INTRAVENOUS at 09:02

## 2021-02-11 RX ADMIN — FENTANYL CITRATE 100 MCG: 50 INJECTION, SOLUTION INTRAMUSCULAR; INTRAVENOUS at 09:02

## 2021-02-11 RX ADMIN — SODIUM CHLORIDE, SODIUM LACTATE, POTASSIUM CHLORIDE, CALCIUM CHLORIDE: 600; 310; 30; 20 INJECTION, SOLUTION INTRAVENOUS at 08:02

## 2021-02-11 RX ADMIN — FENTANYL CITRATE 25 MCG: 50 INJECTION, SOLUTION INTRAMUSCULAR; INTRAVENOUS at 02:02

## 2021-02-11 RX ADMIN — OXYCODONE HYDROCHLORIDE 5 MG: 5 TABLET ORAL at 02:02

## 2021-02-11 RX ADMIN — ONDANSETRON 4 MG: 2 INJECTION INTRAMUSCULAR; INTRAVENOUS at 10:02

## 2021-02-11 RX ADMIN — LIDOCAINE HYDROCHLORIDE 100 MG: 20 INJECTION INTRAVENOUS at 09:02

## 2021-02-11 RX ADMIN — SODIUM CHLORIDE, SODIUM LACTATE, POTASSIUM CHLORIDE, CALCIUM CHLORIDE: 600; 310; 30; 20 INJECTION, SOLUTION INTRAVENOUS at 10:02

## 2021-02-11 RX ADMIN — FENTANYL CITRATE 50 MCG: 50 INJECTION, SOLUTION INTRAMUSCULAR; INTRAVENOUS at 12:02

## 2021-02-11 RX ADMIN — DEXAMETHASONE SODIUM PHOSPHATE 4 MG: 4 INJECTION, SOLUTION INTRA-ARTICULAR; INTRALESIONAL; INTRAMUSCULAR; INTRAVENOUS; SOFT TISSUE at 09:02

## 2021-02-12 VITALS
OXYGEN SATURATION: 93 % | HEART RATE: 108 BPM | WEIGHT: 200 LBS | DIASTOLIC BLOOD PRESSURE: 90 MMHG | BODY MASS INDEX: 30.31 KG/M2 | RESPIRATION RATE: 18 BRPM | TEMPERATURE: 98 F | SYSTOLIC BLOOD PRESSURE: 156 MMHG | HEIGHT: 68 IN

## 2021-06-09 PROBLEM — O24.111 TYPE 2 DIABETES MELLITUS AFFECTING PREGNANCY IN FIRST TRIMESTER, ANTEPARTUM: Status: RESOLVED | Noted: 2019-05-16 | Resolved: 2021-06-09

## 2021-06-10 ENCOUNTER — OFFICE VISIT (OUTPATIENT)
Dept: FAMILY MEDICINE | Facility: CLINIC | Age: 41
End: 2021-06-10
Payer: COMMERCIAL

## 2021-06-10 VITALS
OXYGEN SATURATION: 98 % | BODY MASS INDEX: 29.7 KG/M2 | HEIGHT: 68 IN | HEART RATE: 99 BPM | SYSTOLIC BLOOD PRESSURE: 116 MMHG | WEIGHT: 196 LBS | DIASTOLIC BLOOD PRESSURE: 74 MMHG | TEMPERATURE: 98 F

## 2021-06-10 DIAGNOSIS — R58 ECCHYMOSIS: Primary | ICD-10-CM

## 2021-06-10 DIAGNOSIS — R53.83 FATIGUE, UNSPECIFIED TYPE: ICD-10-CM

## 2021-06-10 DIAGNOSIS — E11.319 TYPE 2 DIABETES MELLITUS WITH LEFT EYE AFFECTED BY RETINOPATHY WITHOUT MACULAR EDEMA, WITHOUT LONG-TERM CURRENT USE OF INSULIN, UNSPECIFIED RETINOPATHY SEVERITY: ICD-10-CM

## 2021-06-10 PROCEDURE — 99214 OFFICE O/P EST MOD 30 MIN: CPT | Mod: S$GLB,,, | Performed by: INTERNAL MEDICINE

## 2021-06-10 PROCEDURE — 99214 PR OFFICE/OUTPT VISIT, EST, LEVL IV, 30-39 MIN: ICD-10-PCS | Mod: S$GLB,,, | Performed by: INTERNAL MEDICINE

## 2021-06-10 PROCEDURE — 99999 PR PBB SHADOW E&M-EST. PATIENT-LVL III: ICD-10-PCS | Mod: PBBFAC,,, | Performed by: INTERNAL MEDICINE

## 2021-06-10 PROCEDURE — 99999 PR PBB SHADOW E&M-EST. PATIENT-LVL III: CPT | Mod: PBBFAC,,, | Performed by: INTERNAL MEDICINE

## 2021-06-10 RX ORDER — DULAGLUTIDE 3 MG/.5ML
INJECTION, SOLUTION SUBCUTANEOUS
COMMUNITY
Start: 2021-05-22 | End: 2021-11-22 | Stop reason: ALTCHOICE

## 2021-06-14 ENCOUNTER — LAB VISIT (OUTPATIENT)
Dept: LAB | Facility: HOSPITAL | Age: 41
End: 2021-06-14
Attending: INTERNAL MEDICINE
Payer: COMMERCIAL

## 2021-06-14 ENCOUNTER — PATIENT MESSAGE (OUTPATIENT)
Dept: FAMILY MEDICINE | Facility: CLINIC | Age: 41
End: 2021-06-14

## 2021-06-14 DIAGNOSIS — E11.319 TYPE 2 DIABETES MELLITUS WITH LEFT EYE AFFECTED BY RETINOPATHY WITHOUT MACULAR EDEMA, WITHOUT LONG-TERM CURRENT USE OF INSULIN, UNSPECIFIED RETINOPATHY SEVERITY: ICD-10-CM

## 2021-06-14 LAB
ALBUMIN/CREAT UR: 16.4 UG/MG (ref 0–30)
CREAT UR-MCNC: 152 MG/DL (ref 15–325)
MICROALBUMIN UR DL<=1MG/L-MCNC: 25 UG/ML

## 2021-06-14 PROCEDURE — 82570 ASSAY OF URINE CREATININE: CPT | Performed by: INTERNAL MEDICINE

## 2021-06-14 PROCEDURE — 82043 UR ALBUMIN QUANTITATIVE: CPT | Performed by: INTERNAL MEDICINE

## 2021-06-15 DIAGNOSIS — E61.1 IRON DEFICIENCY: ICD-10-CM

## 2021-06-15 DIAGNOSIS — E61.1 IRON DEFICIENCY: Primary | ICD-10-CM

## 2021-06-16 ENCOUNTER — PATIENT OUTREACH (OUTPATIENT)
Dept: ADMINISTRATIVE | Facility: HOSPITAL | Age: 41
End: 2021-06-16

## 2021-07-07 ENCOUNTER — PATIENT MESSAGE (OUTPATIENT)
Dept: ADMINISTRATIVE | Facility: HOSPITAL | Age: 41
End: 2021-07-07

## 2021-07-10 NOTE — PROGRESS NOTES
Routine Office Visit    Patient Name: Tonay Rivas    : 1980  MRN: [id number]    Subjective:  Tonya is a 36 y.o. female who presents today for     1. Annual physical   2. Diabetes follow-up - pt is here for her diabetes follow-up. She is currently taking metformin 1000mg bid. She needs prescription refill. She has not been in for almost 1 year.   3. Vaginal irritation - pt has intermittent episodes of vaginal irritation from yeast infection. She states the fluconazole for this. She often gets prescription refills.     Review of Systems   Constitutional: Negative for chills, fever and weight loss.   HENT: Negative for congestion.    Eyes: Negative for blurred vision.   Respiratory: Negative for cough.    Cardiovascular: Negative for chest pain.   Gastrointestinal: Negative for abdominal pain, constipation, diarrhea, heartburn, nausea and vomiting.   Genitourinary: Negative for dysuria.   Musculoskeletal: Negative for myalgias.   Skin: Negative for itching and rash.   Neurological: Negative for dizziness, tremors, seizures, weakness and headaches.   Endo/Heme/Allergies: Positive for polydipsia.   Psychiatric/Behavioral: Negative for depression. The patient is not nervous/anxious.        Answers for HPI/ROS submitted by the patient on 6/15/2017   Diabetes type: type 2  MedicAlert ID: No  Disease duration: 10 years  fatigue: No  foot paresthesias: No  foot ulcerations: No  polyphagia: No  polyuria: Yes  visual change: No  Symptom course: stable  confusion: No  hunger: No  mood changes: No  pallor: No  sleepiness: No  speech difficulty: No  sweats: No  blackouts: No  hospitalization: No  nocturnal hypoglycemia: No  required assistance: No  required glucagon: No  CVA: No  heart disease: No  impotence: No  nephropathy: No  peripheral neuropathy: No  PVD: No  retinopathy: No  autonomic neuropathy: No  CAD risks: family history, diabetes mellitus  Current treatments: diet  Treatment compliance: most of the time  Dose  "schedule: pre-breakfast  Given by: patient  Injection sites: abdominal wall  Home blood tests: 3-4 x per week  Home urines: <1 x per month  Monitoring compliance: fair  Blood glucose trend: decreasing steadily  Weight trend: decreasing steadily  Current diet: diabetic  Meal planning: avoidance of concentrated sweets  Exercise: weekly  Dietitian visit: No  Eye exam current: Yes  Sees podiatrist: No    Active Problem List  Patient Active Problem List   Diagnosis    Obesity    Type 2 diabetes mellitus    Annual physical exam       Past Surgical History  Past Surgical History:   Procedure Laterality Date     SECTION      x3    EXTRACORPOREAL SHOCK WAVE LITHOTRIPSY      LAPAROSCOPIC GASTRIC BANDING         Family History  Family History   Problem Relation Age of Onset    Cancer Mother     Learning disabilities Son     Heart disease Maternal Grandfather     Breast cancer Neg Hx     Colon cancer Neg Hx     Ovarian cancer Neg Hx        Social History  Social History     Social History    Marital status:      Spouse name: N/A    Number of children: N/A    Years of education: N/A     Occupational History    Not on file.     Social History Main Topics    Smoking status: Never Smoker    Smokeless tobacco: Never Used    Alcohol use No      Comment: social    Drug use: No    Sexual activity: Yes     Partners: Male     Birth control/ protection: IUD     Other Topics Concern    Not on file     Social History Narrative    No narrative on file       Medications and Allergies  Reviewed and updated.   Current Outpatient Prescriptions   Medication Sig    BD INSULIN SYRINGE ULTRA-FINE 1/2 mL 31 x 5/16" Syrg     blood sugar diagnostic Strp Checks 3 times daily for [employer] Prime meter    BLOOD-GLUCOSE METER (ONE TOUCH BASIC SYSTEM MISC) x x x x.  test blood sugars twice daily.One touch ultra mini test strips and lancets brand    fluconazole (DIFLUCAN) 150 MG Tab TAKE 1 TABLET BY MOUTH ONCE AND MAY " "RETREAT IN 3 DAYS IF STILL SYMPTOMATIC    insulin syringe-needle U-100 (BD INSULIN SYRINGE ULTRA-FINE) 0.3 mL 31 gauge x 5/16 Syrg For twice a day insulin injections    lancets Misc 1 Device by Misc.(Non-Drug; Combo Route) route 2 (two) times daily. Patient has [employer]ion Prime meter.  Tests 3 times daily    levonorgestrel (MIRENA) 20 mcg/24 hour (5 years) IUD 1 each by Intrauterine route once.    metformin (GLUCOPHAGE) 1000 MG tablet Take 1 tablet (1,000 mg total) by mouth 2 (two) times daily with meals.    pen needle, diabetic (NOVOTWIST) 32 gauge x 1/5" Ndle Check blood sugar QID    lisinopril (PRINIVIL,ZESTRIL) 2.5 MG tablet Take 1 tablet (2.5 mg total) by mouth once daily.     No current facility-administered medications for this visit.        Physical Exam  /80 (BP Location: Right arm, Patient Position: Sitting, BP Method: Manual)   Pulse 103   Temp 98.4 °F (36.9 °C) (Oral)   Ht 5' 8" (1.727 m)   Wt 88.6 kg (195 lb 5.2 oz)   SpO2 97%   BMI 29.70 kg/m²   Physical Exam   Constitutional: She is oriented to person, place, and time. She appears well-developed and well-nourished.   HENT:   Head: Normocephalic and atraumatic.   Eyes: Conjunctivae and EOM are normal. Pupils are equal, round, and reactive to light.   Neck: Normal range of motion. Neck supple.   Cardiovascular: Normal rate, regular rhythm and normal heart sounds.  Exam reveals no gallop and no friction rub.    No murmur heard.  Pulmonary/Chest: Breath sounds normal. No respiratory distress.   Abdominal: Soft. Bowel sounds are normal. She exhibits no distension. There is no tenderness.   Musculoskeletal: Normal range of motion.   Lymphadenopathy:     She has no cervical adenopathy.   Neurological: She is alert and oriented to person, place, and time.   Skin: Skin is warm.   Psychiatric: She has a normal mood and affect.     Protective Sensation (w/ 10 gram monofilament):  Right: Intact  Left: Intact    Visual Inspection:  Normal -  " Bilateral and Nails Intact - without Evidence of Foot Deformity- Bilateral    Pedal Pulses:   Right: Present  Left: Present    Posterior tibialis:   Right:Present  Left: Present        Assessment/Plan:  Tonya Rivas is a 36 y.o. female who presents today for :    Annual physical exam  -     CBC auto differential; Future; Expected date: 06/15/2017  -     Lipid panel; Future; Expected date: 06/15/2017  -     Comprehensive metabolic panel; Future; Expected date: 06/15/2017  -     TSH; Future; Expected date: 06/15/2017  -     Hemoglobin A1c; Future; Expected date: 06/15/2017    Type 2 diabetes, uncontrolled, with neuropathy  -     metformin (GLUCOPHAGE) 1000 MG tablet; Take 1 tablet (1,000 mg total) by mouth 2 (two) times daily with meals.  Dispense: 180 tablet; Refill: 0  -     blood sugar diagnostic Strp; Checks 3 times daily for [employer]ion Prime meter  Dispense: 100 each; Refill: 12  -     CBC auto differential; Future; Expected date: 06/15/2017  -     Lipid panel; Future; Expected date: 06/15/2017  -     Comprehensive metabolic panel; Future; Expected date: 06/15/2017  -     TSH; Future; Expected date: 06/15/2017  -     Hemoglobin A1c; Future; Expected date: 06/15/2017  -     Microalbumin/creatinine urine ratio; Future; Expected date: 06/15/2017  The current medical regimen is effective;  continue present plan and medications.  Will refill medication  Last a1c 7.1 - well controlled.   Pt has not been takingher lisinopril - she states it causes her to cough   Will obtain urine microalbumin and evaluate need for lisinopril   F/u with labs and evaluate need for statin    Vaginal discharge  -     fluconazole (DIFLUCAN) 150 MG Tab; TAKE 1 TABLET BY MOUTH ONCE AND MAY RETREAT IN 3 DAYS IF STILL SYMPTOMATIC  Dispense: 2 tablet; Refill: 0  -     Vaginosis Screen by DNA Probe  Will treat for acute vaginitis   Advise pt - symptoms may be related to diabetes   Will f/u with culture and treat as indicated   Pt has appt with gyn on  Monday         Return in about 3 months (around 9/15/2017).     central line insertion

## 2021-08-06 ENCOUNTER — CLINICAL SUPPORT (OUTPATIENT)
Dept: URGENT CARE | Facility: CLINIC | Age: 41
End: 2021-08-06
Payer: COMMERCIAL

## 2021-08-06 DIAGNOSIS — Z20.822 ENCOUNTER FOR LABORATORY TESTING FOR COVID-19 VIRUS: Primary | ICD-10-CM

## 2021-08-06 LAB
CTP QC/QA: YES
SARS-COV-2 RDRP RESP QL NAA+PROBE: NEGATIVE

## 2021-08-06 PROCEDURE — U0002 COVID-19 LAB TEST NON-CDC: HCPCS | Mod: QW,S$GLB,, | Performed by: PHYSICIAN ASSISTANT

## 2021-08-06 PROCEDURE — U0002: ICD-10-PCS | Mod: QW,S$GLB,, | Performed by: PHYSICIAN ASSISTANT

## 2021-10-04 ENCOUNTER — PATIENT MESSAGE (OUTPATIENT)
Dept: ADMINISTRATIVE | Facility: HOSPITAL | Age: 41
End: 2021-10-04

## 2021-11-18 DIAGNOSIS — E11.319 TYPE 2 DIABETES MELLITUS WITH LEFT EYE AFFECTED BY RETINOPATHY WITHOUT MACULAR EDEMA, WITHOUT LONG-TERM CURRENT USE OF INSULIN, UNSPECIFIED RETINOPATHY SEVERITY: Primary | ICD-10-CM

## 2021-11-18 DIAGNOSIS — E61.1 IRON DEFICIENCY: ICD-10-CM

## 2021-11-18 DIAGNOSIS — R58 ECCHYMOSIS: ICD-10-CM

## 2021-11-20 ENCOUNTER — LAB VISIT (OUTPATIENT)
Dept: LAB | Facility: HOSPITAL | Age: 41
End: 2021-11-20
Attending: INTERNAL MEDICINE
Payer: COMMERCIAL

## 2021-11-20 DIAGNOSIS — E61.1 IRON DEFICIENCY: ICD-10-CM

## 2021-11-20 DIAGNOSIS — E11.9 TYPE 2 DIABETES MELLITUS WITHOUT COMPLICATION: ICD-10-CM

## 2021-11-20 DIAGNOSIS — E11.319 TYPE 2 DIABETES MELLITUS WITH LEFT EYE AFFECTED BY RETINOPATHY WITHOUT MACULAR EDEMA, WITHOUT LONG-TERM CURRENT USE OF INSULIN, UNSPECIFIED RETINOPATHY SEVERITY: ICD-10-CM

## 2021-11-20 LAB
ALBUMIN SERPL BCP-MCNC: 3.7 G/DL (ref 3.5–5.2)
ALP SERPL-CCNC: 88 U/L (ref 55–135)
ALT SERPL W/O P-5'-P-CCNC: 30 U/L (ref 10–44)
ANION GAP SERPL CALC-SCNC: 8 MMOL/L (ref 8–16)
AST SERPL-CCNC: 51 U/L (ref 10–40)
BASOPHILS # BLD AUTO: 0.04 K/UL (ref 0–0.2)
BASOPHILS NFR BLD: 0.7 % (ref 0–1.9)
BILIRUB SERPL-MCNC: 0.4 MG/DL (ref 0.1–1)
BUN SERPL-MCNC: 10 MG/DL (ref 6–20)
CALCIUM SERPL-MCNC: 8.9 MG/DL (ref 8.7–10.5)
CHLORIDE SERPL-SCNC: 108 MMOL/L (ref 95–110)
CHOLEST SERPL-MCNC: 143 MG/DL (ref 120–199)
CHOLEST/HDLC SERPL: 2.8 {RATIO} (ref 2–5)
CO2 SERPL-SCNC: 22 MMOL/L (ref 23–29)
CREAT SERPL-MCNC: 0.6 MG/DL (ref 0.5–1.4)
DIFFERENTIAL METHOD: NORMAL
EOSINOPHIL # BLD AUTO: 0.1 K/UL (ref 0–0.5)
EOSINOPHIL NFR BLD: 1.4 % (ref 0–8)
ERYTHROCYTE [DISTWIDTH] IN BLOOD BY AUTOMATED COUNT: 13.1 % (ref 11.5–14.5)
EST. GFR  (AFRICAN AMERICAN): >60 ML/MIN/1.73 M^2
EST. GFR  (NON AFRICAN AMERICAN): >60 ML/MIN/1.73 M^2
ESTIMATED AVG GLUCOSE: 160 MG/DL (ref 68–131)
FERRITIN SERPL-MCNC: 20 NG/ML (ref 20–300)
GLUCOSE SERPL-MCNC: 210 MG/DL (ref 70–110)
HBA1C MFR BLD: 7.2 % (ref 4–5.6)
HCT VFR BLD AUTO: 39.1 % (ref 37–48.5)
HDLC SERPL-MCNC: 51 MG/DL (ref 40–75)
HDLC SERPL: 35.7 % (ref 20–50)
HGB BLD-MCNC: 12.7 G/DL (ref 12–16)
IMM GRANULOCYTES # BLD AUTO: 0.03 K/UL (ref 0–0.04)
IMM GRANULOCYTES NFR BLD AUTO: 0.5 % (ref 0–0.5)
LDLC SERPL CALC-MCNC: 82 MG/DL (ref 63–159)
LYMPHOCYTES # BLD AUTO: 2 K/UL (ref 1–4.8)
LYMPHOCYTES NFR BLD: 34 % (ref 18–48)
MCH RBC QN AUTO: 28.2 PG (ref 27–31)
MCHC RBC AUTO-ENTMCNC: 32.5 G/DL (ref 32–36)
MCV RBC AUTO: 87 FL (ref 82–98)
MONOCYTES # BLD AUTO: 0.5 K/UL (ref 0.3–1)
MONOCYTES NFR BLD: 8.9 % (ref 4–15)
NEUTROPHILS # BLD AUTO: 3.1 K/UL (ref 1.8–7.7)
NEUTROPHILS NFR BLD: 54.5 % (ref 38–73)
NONHDLC SERPL-MCNC: 92 MG/DL
NRBC BLD-RTO: 0 /100 WBC
PLATELET # BLD AUTO: 323 K/UL (ref 150–450)
PMV BLD AUTO: 9.9 FL (ref 9.2–12.9)
POTASSIUM SERPL-SCNC: 4.8 MMOL/L (ref 3.5–5.1)
PROT SERPL-MCNC: 6.6 G/DL (ref 6–8.4)
RBC # BLD AUTO: 4.51 M/UL (ref 4–5.4)
SODIUM SERPL-SCNC: 138 MMOL/L (ref 136–145)
TRIGL SERPL-MCNC: 50 MG/DL (ref 30–150)
WBC # BLD AUTO: 5.73 K/UL (ref 3.9–12.7)

## 2021-11-20 PROCEDURE — 80053 COMPREHEN METABOLIC PANEL: CPT | Performed by: INTERNAL MEDICINE

## 2021-11-20 PROCEDURE — 82728 ASSAY OF FERRITIN: CPT | Performed by: INTERNAL MEDICINE

## 2021-11-20 PROCEDURE — 80061 LIPID PANEL: CPT | Performed by: INTERNAL MEDICINE

## 2021-11-20 PROCEDURE — 83036 HEMOGLOBIN GLYCOSYLATED A1C: CPT | Performed by: INTERNAL MEDICINE

## 2021-11-20 PROCEDURE — 85025 COMPLETE CBC W/AUTO DIFF WBC: CPT | Performed by: INTERNAL MEDICINE

## 2021-11-20 PROCEDURE — 36415 COLL VENOUS BLD VENIPUNCTURE: CPT | Mod: PO | Performed by: INTERNAL MEDICINE

## 2021-11-22 ENCOUNTER — PATIENT OUTREACH (OUTPATIENT)
Dept: ADMINISTRATIVE | Facility: HOSPITAL | Age: 41
End: 2021-11-22
Payer: COMMERCIAL

## 2021-11-22 ENCOUNTER — OFFICE VISIT (OUTPATIENT)
Dept: FAMILY MEDICINE | Facility: CLINIC | Age: 41
End: 2021-11-22
Payer: COMMERCIAL

## 2021-11-22 VITALS
TEMPERATURE: 98 F | HEIGHT: 68 IN | DIASTOLIC BLOOD PRESSURE: 80 MMHG | OXYGEN SATURATION: 98 % | BODY MASS INDEX: 29.25 KG/M2 | SYSTOLIC BLOOD PRESSURE: 130 MMHG | WEIGHT: 193 LBS | HEART RATE: 89 BPM

## 2021-11-22 DIAGNOSIS — Z00.00 NORMAL PHYSICAL EXAM: Primary | ICD-10-CM

## 2021-11-22 DIAGNOSIS — E61.1 IRON DEFICIENCY: ICD-10-CM

## 2021-11-22 DIAGNOSIS — E11.319 TYPE 2 DIABETES MELLITUS WITH LEFT EYE AFFECTED BY RETINOPATHY WITHOUT MACULAR EDEMA, WITHOUT LONG-TERM CURRENT USE OF INSULIN, UNSPECIFIED RETINOPATHY SEVERITY: ICD-10-CM

## 2021-11-22 DIAGNOSIS — M65.4 DE QUERVAIN'S DISEASE (RADIAL STYLOID TENOSYNOVITIS): ICD-10-CM

## 2021-11-22 DIAGNOSIS — Z23 INFLUENZA VACCINE NEEDED: ICD-10-CM

## 2021-11-22 PROBLEM — E65 LOCALIZED ADIPOSITY: Status: RESOLVED | Noted: 2021-02-11 | Resolved: 2021-11-22

## 2021-11-22 PROBLEM — Z98.84 STATUS POST GASTRIC BANDING: Status: ACTIVE | Noted: 2021-11-22

## 2021-11-22 PROCEDURE — 99999 PR PBB SHADOW E&M-EST. PATIENT-LVL IV: CPT | Mod: PBBFAC,,, | Performed by: INTERNAL MEDICINE

## 2021-11-22 PROCEDURE — 90686 FLU VACCINE (QUAD) GREATER THAN OR EQUAL TO 3YO PRESERVATIVE FREE IM: ICD-10-PCS | Mod: S$GLB,,, | Performed by: INTERNAL MEDICINE

## 2021-11-22 PROCEDURE — 99396 PREV VISIT EST AGE 40-64: CPT | Mod: 25,S$GLB,, | Performed by: INTERNAL MEDICINE

## 2021-11-22 PROCEDURE — 99396 PR PREVENTIVE VISIT,EST,40-64: ICD-10-PCS | Mod: 25,S$GLB,, | Performed by: INTERNAL MEDICINE

## 2021-11-22 PROCEDURE — 90471 FLU VACCINE (QUAD) GREATER THAN OR EQUAL TO 3YO PRESERVATIVE FREE IM: ICD-10-PCS | Mod: S$GLB,,, | Performed by: INTERNAL MEDICINE

## 2021-11-22 PROCEDURE — 90686 IIV4 VACC NO PRSV 0.5 ML IM: CPT | Mod: S$GLB,,, | Performed by: INTERNAL MEDICINE

## 2021-11-22 PROCEDURE — 90471 IMMUNIZATION ADMIN: CPT | Mod: S$GLB,,, | Performed by: INTERNAL MEDICINE

## 2021-11-22 PROCEDURE — 99999 PR PBB SHADOW E&M-EST. PATIENT-LVL IV: ICD-10-PCS | Mod: PBBFAC,,, | Performed by: INTERNAL MEDICINE

## 2021-11-22 RX ORDER — DULAGLUTIDE 4.5 MG/.5ML
INJECTION, SOLUTION SUBCUTANEOUS
COMMUNITY
Start: 2021-11-04 | End: 2023-04-14 | Stop reason: ALTCHOICE

## 2021-11-22 RX ORDER — CLOBETASOL PROPIONATE 0.5 MG/G
OINTMENT TOPICAL
COMMUNITY
Start: 2021-07-16 | End: 2023-05-23

## 2022-02-28 ENCOUNTER — TELEPHONE (OUTPATIENT)
Dept: BARIATRICS | Facility: CLINIC | Age: 42
End: 2022-02-28
Payer: COMMERCIAL

## 2022-03-16 DIAGNOSIS — Z12.31 OTHER SCREENING MAMMOGRAM: ICD-10-CM

## 2022-04-08 ENCOUNTER — PATIENT MESSAGE (OUTPATIENT)
Dept: FAMILY MEDICINE | Facility: CLINIC | Age: 42
End: 2022-04-08
Payer: COMMERCIAL

## 2022-04-08 DIAGNOSIS — Z87.42 HISTORY OF RECURRENT VAGINAL DISCHARGE: ICD-10-CM

## 2022-04-08 RX ORDER — FLUCONAZOLE 150 MG/1
150 TABLET ORAL ONCE
Qty: 1 TABLET | Refills: 0 | Status: SHIPPED | OUTPATIENT
Start: 2022-04-08 | End: 2022-04-08

## 2022-04-08 NOTE — TELEPHONE ENCOUNTER
Refill Routing Note   Medication(s) are not appropriate for processing by [facility] for the following reason(s):      - Outside of protocol    [facility] action(s):  Route          Medication reconciliation completed: No     Appointments  past 12m or future 3m with PCP    Date Provider   Last Visit   11/22/2021 Hernando Duncan MD   Next Visit   Visit date not found Hernando Duncan MD   ED visits in past 90 days: 0        Note composed:9:03 AM 04/08/2022

## 2022-04-10 ENCOUNTER — PATIENT OUTREACH (OUTPATIENT)
Dept: ADMINISTRATIVE | Facility: OTHER | Age: 42
End: 2022-04-10
Payer: COMMERCIAL

## 2022-04-10 NOTE — PROGRESS NOTES
LINKS immunization registry updated  Care Everywhere updated  Health Maintenance updated  DIS/Chart reviewed for overdue Proactive [facility] Encounters (LEONARDO) health maintenance testing (CRS, Breast Ca, Diabetic Eye Exam)   Orders entered:N/A

## 2022-04-13 ENCOUNTER — OFFICE VISIT (OUTPATIENT)
Dept: BARIATRICS | Facility: CLINIC | Age: 42
End: 2022-04-13
Payer: COMMERCIAL

## 2022-04-13 VITALS
OXYGEN SATURATION: 98 % | HEART RATE: 97 BPM | BODY MASS INDEX: 29.7 KG/M2 | SYSTOLIC BLOOD PRESSURE: 112 MMHG | DIASTOLIC BLOOD PRESSURE: 64 MMHG | HEIGHT: 68 IN | WEIGHT: 196 LBS

## 2022-04-13 DIAGNOSIS — R63.4 WEIGHT LOSS: ICD-10-CM

## 2022-04-13 DIAGNOSIS — Z98.84 HISTORY OF LAPAROSCOPIC ADJUSTABLE GASTRIC BANDING: Primary | ICD-10-CM

## 2022-04-13 PROCEDURE — 99999 PR PBB SHADOW E&M-EST. PATIENT-LVL III: CPT | Mod: PBBFAC,,, | Performed by: PHYSICIAN ASSISTANT

## 2022-04-13 PROCEDURE — 99204 PR OFFICE/OUTPT VISIT, NEW, LEVL IV, 45-59 MIN: ICD-10-PCS | Mod: S$GLB,,, | Performed by: PHYSICIAN ASSISTANT

## 2022-04-13 PROCEDURE — 99999 PR PBB SHADOW E&M-EST. PATIENT-LVL III: ICD-10-PCS | Mod: PBBFAC,,, | Performed by: PHYSICIAN ASSISTANT

## 2022-04-13 PROCEDURE — 99204 OFFICE O/P NEW MOD 45 MIN: CPT | Mod: S$GLB,,, | Performed by: PHYSICIAN ASSISTANT

## 2022-04-13 NOTE — PATIENT INSTRUCTIONS
Sample meal plan  80-120g protein; 0581-2185 calories  Protein drinks and bars: 0-4 grams sugar  Drink lots of water throughout the day and exercise!  MENU # 1  Breakfast: 2 eggs, 1 turkey sausage sy  Lunch: 2-3 roll-ups (sliced turkey, low-fat slice cheese), baby carrots dipped in 1 Tbsp natural peanut butter  Mid-Day Snack: ¼ cup unsalted almonds, ½ cup fruit  Supper: 1 chicken thigh simmered in tomato sauce + 2 Tbsp mozzarella cheese, ½ cup black beans, 1/2 cup steamed veggies  Evening Snack: 1/2 cup grapes with 4 cubes low-fat cheddar cheese   ___________________________________________________  MENU # 2  Breakfast: protein drink  Mid-morning snack : ¼ cup unsalted nuts  Lunch: 1 cup tuna or chicken salad made with light palma, over salad.   Supper: hamburger sy, slice of cheese, 1 cup steamed veggies.   Snack: light yogurt      Menu #3  Breakfast: 6oz plain Greek yogurt + fruit (½ banana, ½ cup fruit - pineapple, blueberries, strawberries, peach), may add Splenda to joel.  Lunch: ½  chicken breast w/ slice pepper rylan cheese, 1/2 cup steamed veggies and small salad with Salad Spritzer.    Mid-Day snack: protein drink   Supper: 4oz Grilled fish, grilled veggie kabob ( mushrooms, onion, bell pepper, yellow squash, zucchini, cherry tomatoes)  Evening Snack: fudgsicle no-sugar-added    Menu # 4  Breakfast: vanilla iced coffee: 1 scoop vanilla protein powder + 4oz skim milk + 4oz coffee   Snack: protein bar  Lunch: 2 Lettuce tacos: ¼ cup seasoned ground turkey wrapped in a Joey lettuce leaf with 1 Tbsp shredded cheese and dollop low-fat sour cream  Dinner: Shrimp omelet: 2 eggs, ½ cup shrimp, green onions, and shredded cheese        Menu #5  Breakfast: 1 cup low-fat cottage cheese, ½ cup peaches (no sugar added)  Lunch: 2 oz baked pork chop, 1 cup okra and tomato stew  Snack: 1 cup black beans + salsa + dollop sour cream  Dinner: Caprese chicken salad: 2 oz chicken, 1oz fresh mozzarella, sliced tomato, 1  Tbsp olive oil, basil  Snack: sugar-free pudding cup      Menu #6  Breakfast: ½ cup part-skim ricotta cheese, 2 Tbsp sugar-free strawberry preserves, ¼ cup slivered almonds  Lunch: 2 oz canned chicken, 1oz shredded cheddar cheese, ½ cup black beans, 2 Tbsp salsa  Snack: Protein drink  Dinner: 4 oz grilled salmon steak, over mixed green salad with light dressing

## 2022-04-13 NOTE — PROGRESS NOTES
"Subjective:       Patient ID: Tonya Rivas is a 41 y.o. female    Chief Complaint: Consult    HPI: Patient presents to re-establish care of her Lap Band.  She is without complaints.  She reports that she has learned how to eat and maintain her weight.  She states that her portions are normal and she is hungry between her meals.  Her main reason for making a visit today is to have the band checked.  She denies: heartburn, nausea, abdominal pain, change in BM pattern, dysphagia, shortness of breath, nocturnal reflux, and chest pain.  She does admit to vomiting when she eats too quickly or if she overeats.  She states this only happens "once in a blue moon."                                                                         Type of Surgery:  Lap Band       Date of Surgery: 8/12/2005  PRE-SURG -> Weight: 260 lbs BMI: 40 IBW: 150 lbs Goal Weight: 180 lbs  09/18/2007-> Weight: 245 lbs BMI: 37 Weight loss: 15 lbs EWL: 13%  02/19/2008-> Weight: 232 lbs BMI: 35 Weight loss: 27 lbs EWL: 23%  07/22/2008-> Weight: 216 lbs BMI: 32 Weight loss: 43 lbs EWL: 37%  10/13/2008-> Weight: 220 lbs BMI: 33 Weight loss: 39 lbs EWL: 34%  11/24/2008-> Weight: 221 lbs BMI: 33 Weight loss: 38 lbs EWL: 33%    EXERCISE: Walking.   VITAMINS: Reviewed in bariatric assessment.   DIET:  Regular diet.                                                                                                                               Review of Systems   Constitutional: Negative for activity change, appetite change, fatigue and fever.   HENT: Negative for trouble swallowing and voice change.    Respiratory: Negative for cough (nocturnal), choking and chest tightness.    Cardiovascular: Negative for chest pain and palpitations.   Gastrointestinal: Negative for abdominal distention, abdominal pain, blood in stool, constipation, diarrhea, nausea and vomiting.     Objective:      Physical Exam  Constitutional:       General: She is not in acute " distress.     Appearance: Normal appearance. She is not ill-appearing, toxic-appearing or diaphoretic.   HENT:      Head: Normocephalic and atraumatic.   Eyes:      General:         Right eye: No discharge.         Left eye: No discharge.      Extraocular Movements: Extraocular movements intact.      Conjunctiva/sclera: Conjunctivae normal.      Pupils: Pupils are equal, round, and reactive to light.   Pulmonary:      Effort: Pulmonary effort is normal. No respiratory distress.   Abdominal:      Tenderness: There is no abdominal tenderness.      Comments: Patient palpated abdomen   Neurological:      General: No focal deficit present.      Mental Status: She is alert and oriented to person, place, and time.   Psychiatric:         Mood and Affect: Mood normal.         Behavior: Behavior normal.         Thought Content: Thought content normal.         Judgment: Judgment normal.        Assessment:       -  Inadequate band restriction and satiety.  -  Great overall weight loss s/p Lap-Band on  8/5/2005.  -  Co Morbidities: Hypertension and diabetes mellitus.  -  Good diet.  -  Good exercise.  -  Good vitamin.                       Plan:                                                            -  Band adjustment, will do after UGI.  -  Esophagram to evaluate band position.  -  The patient to continue good dietary habits for weight loss maintenance.  -  Continue regular exercise.   -  Follow up with dietitian PRN.  -  Return to clinic in 1 month or sooner if needed.  -  Call the office for any issues.      20 minute visit, over 50% of time spent counseling patient face to face on diet, exercise, and weight loss.

## 2022-05-05 ENCOUNTER — PATIENT MESSAGE (OUTPATIENT)
Dept: BARIATRICS | Facility: CLINIC | Age: 42
End: 2022-05-05
Payer: COMMERCIAL

## 2022-05-10 ENCOUNTER — PATIENT MESSAGE (OUTPATIENT)
Dept: BARIATRICS | Facility: CLINIC | Age: 42
End: 2022-05-10
Payer: COMMERCIAL

## 2022-05-11 ENCOUNTER — DOCUMENTATION ONLY (OUTPATIENT)
Dept: BARIATRICS | Facility: CLINIC | Age: 42
End: 2022-05-11
Payer: COMMERCIAL

## 2022-05-11 NOTE — PROGRESS NOTES
Bariatric dashboard updated from workup in progress to post-op.  Patient wanting maintenance of lap band placed 2005.

## 2022-05-31 ENCOUNTER — PATIENT MESSAGE (OUTPATIENT)
Dept: ADMINISTRATIVE | Facility: HOSPITAL | Age: 42
End: 2022-05-31
Payer: COMMERCIAL

## 2022-06-10 ENCOUNTER — PATIENT MESSAGE (OUTPATIENT)
Dept: BARIATRICS | Facility: CLINIC | Age: 42
End: 2022-06-10
Payer: COMMERCIAL

## 2022-06-14 ENCOUNTER — PATIENT MESSAGE (OUTPATIENT)
Dept: BARIATRICS | Facility: CLINIC | Age: 42
End: 2022-06-14
Payer: COMMERCIAL

## 2022-07-05 ENCOUNTER — PATIENT MESSAGE (OUTPATIENT)
Dept: BARIATRICS | Facility: CLINIC | Age: 42
End: 2022-07-05
Payer: COMMERCIAL

## 2022-08-02 ENCOUNTER — PATIENT MESSAGE (OUTPATIENT)
Dept: BARIATRICS | Facility: CLINIC | Age: 42
End: 2022-08-02
Payer: COMMERCIAL

## 2022-08-04 ENCOUNTER — PATIENT MESSAGE (OUTPATIENT)
Dept: BARIATRICS | Facility: CLINIC | Age: 42
End: 2022-08-04
Payer: COMMERCIAL

## 2022-08-18 ENCOUNTER — PATIENT MESSAGE (OUTPATIENT)
Dept: ADMINISTRATIVE | Facility: HOSPITAL | Age: 42
End: 2022-08-18
Payer: COMMERCIAL

## 2022-09-07 ENCOUNTER — PATIENT MESSAGE (OUTPATIENT)
Dept: BARIATRICS | Facility: CLINIC | Age: 42
End: 2022-09-07
Payer: COMMERCIAL

## 2022-09-16 ENCOUNTER — OFFICE VISIT (OUTPATIENT)
Dept: FAMILY MEDICINE | Facility: CLINIC | Age: 42
End: 2022-09-16
Payer: COMMERCIAL

## 2022-09-16 ENCOUNTER — PATIENT OUTREACH (OUTPATIENT)
Dept: ADMINISTRATIVE | Facility: HOSPITAL | Age: 42
End: 2022-09-16
Payer: COMMERCIAL

## 2022-09-16 VITALS
HEIGHT: 68 IN | BODY MASS INDEX: 29.9 KG/M2 | DIASTOLIC BLOOD PRESSURE: 80 MMHG | OXYGEN SATURATION: 98 % | TEMPERATURE: 98 F | WEIGHT: 197.31 LBS | HEART RATE: 86 BPM | SYSTOLIC BLOOD PRESSURE: 118 MMHG

## 2022-09-16 DIAGNOSIS — E11.319 TYPE 2 DIABETES MELLITUS WITH LEFT EYE AFFECTED BY RETINOPATHY WITHOUT MACULAR EDEMA, WITHOUT LONG-TERM CURRENT USE OF INSULIN, UNSPECIFIED RETINOPATHY SEVERITY: ICD-10-CM

## 2022-09-16 DIAGNOSIS — S91.112A LACERATION OF LEFT GREAT TOE WITHOUT FOREIGN BODY PRESENT OR DAMAGE TO NAIL, INITIAL ENCOUNTER: Primary | ICD-10-CM

## 2022-09-16 DIAGNOSIS — E61.1 IRON DEFICIENCY: ICD-10-CM

## 2022-09-16 DIAGNOSIS — L40.9 PSORIASIS OF NAIL: ICD-10-CM

## 2022-09-16 DIAGNOSIS — Z23 NEED FOR TDAP VACCINATION: ICD-10-CM

## 2022-09-16 DIAGNOSIS — Z23 NEED FOR VACCINATION FOR STREP PNEUMONIAE: ICD-10-CM

## 2022-09-16 DIAGNOSIS — Z98.84 STATUS POST GASTRIC BANDING: ICD-10-CM

## 2022-09-16 DIAGNOSIS — Z23 NEEDS FLU SHOT: ICD-10-CM

## 2022-09-16 PROCEDURE — 90686 FLU VACCINE (QUAD) GREATER THAN OR EQUAL TO 3YO PRESERVATIVE FREE IM: ICD-10-PCS | Mod: S$GLB,,, | Performed by: INTERNAL MEDICINE

## 2022-09-16 PROCEDURE — 90677 PCV20 VACCINE IM: CPT | Mod: S$GLB,,, | Performed by: INTERNAL MEDICINE

## 2022-09-16 PROCEDURE — 90715 TDAP VACCINE 7 YRS/> IM: CPT | Mod: S$GLB,,, | Performed by: INTERNAL MEDICINE

## 2022-09-16 PROCEDURE — 99214 OFFICE O/P EST MOD 30 MIN: CPT | Mod: 25,S$GLB,, | Performed by: INTERNAL MEDICINE

## 2022-09-16 PROCEDURE — 90472 IMMUNIZATION ADMIN EACH ADD: CPT | Mod: S$GLB,,, | Performed by: INTERNAL MEDICINE

## 2022-09-16 PROCEDURE — 90472 TDAP VACCINE GREATER THAN OR EQUAL TO 7YO IM: ICD-10-PCS | Mod: S$GLB,,, | Performed by: INTERNAL MEDICINE

## 2022-09-16 PROCEDURE — 99999 PR PBB SHADOW E&M-EST. PATIENT-LVL III: CPT | Mod: PBBFAC,,, | Performed by: INTERNAL MEDICINE

## 2022-09-16 PROCEDURE — 99999 PR PBB SHADOW E&M-EST. PATIENT-LVL III: ICD-10-PCS | Mod: PBBFAC,,, | Performed by: INTERNAL MEDICINE

## 2022-09-16 PROCEDURE — 90471 IMMUNIZATION ADMIN: CPT | Mod: S$GLB,,, | Performed by: INTERNAL MEDICINE

## 2022-09-16 PROCEDURE — 90715 TDAP VACCINE GREATER THAN OR EQUAL TO 7YO IM: ICD-10-PCS | Mod: S$GLB,,, | Performed by: INTERNAL MEDICINE

## 2022-09-16 PROCEDURE — 90686 IIV4 VACC NO PRSV 0.5 ML IM: CPT | Mod: S$GLB,,, | Performed by: INTERNAL MEDICINE

## 2022-09-16 PROCEDURE — 99214 PR OFFICE/OUTPT VISIT, EST, LEVL IV, 30-39 MIN: ICD-10-PCS | Mod: 25,S$GLB,, | Performed by: INTERNAL MEDICINE

## 2022-09-16 PROCEDURE — 90677 PNEUMOCOCCAL CONJUGATE VACCINE 20-VALENT: ICD-10-PCS | Mod: S$GLB,,, | Performed by: INTERNAL MEDICINE

## 2022-09-16 PROCEDURE — 90471 FLU VACCINE (QUAD) GREATER THAN OR EQUAL TO 3YO PRESERVATIVE FREE IM: ICD-10-PCS | Mod: S$GLB,,, | Performed by: INTERNAL MEDICINE

## 2022-09-16 RX ORDER — BETAMETHASONE DIPROPIONATE 0.5 MG/G
CREAM TOPICAL 2 TIMES DAILY
Qty: 50 G | Refills: 0 | Status: SHIPPED | OUTPATIENT
Start: 2022-09-16 | End: 2022-12-26 | Stop reason: SDUPTHER

## 2022-09-16 RX ORDER — CEPHALEXIN 500 MG/1
500 CAPSULE ORAL EVERY 6 HOURS
Qty: 20 CAPSULE | Refills: 0 | Status: SHIPPED | OUTPATIENT
Start: 2022-09-16 | End: 2022-09-21

## 2022-09-16 RX ORDER — ERGOCALCIFEROL 1.25 MG/1
50000 CAPSULE ORAL
COMMUNITY
Start: 2022-06-24 | End: 2023-08-31

## 2022-09-16 NOTE — PROGRESS NOTES
This note was created by combination of typed  and M-Modal dictation.  Transcription errors may be present.  If there are any questions, please contact me.    Assessment and Plan:   Laceration of left great toe without foreign body present or damage to nail, initial encounter  -applied Dermabond.  With subsequent cessation of losing.    Keflex  Update tetanus shot  Declines pain medication  -     (In Office Administered) Tdap Vaccine  -     cephALEXin (KEFLEX) 500 MG capsule; Take 1 capsule (500 mg total) by mouth every 6 (six) hours. 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  SGLT2 with side effects.  No longer taking.  Back on metformin and Trulicity.  Will try to get results of outside eye exam doctor Casey    Status post gastric banding  Iron deficiency/low ferritin; normal CBC  -followed by bariatrics    Psoriasis of nail  -refilled Diprolene AF  -     augmented betamethasone dipropionate (DIPROLENE-AF) 0.05 % cream; Apply topically 2 (two) times daily.  Dispense: 50 g; Refill: 0    Need for Tdap vaccination  -     (In Office Administered) Tdap Vaccine    Needs flu shot  -     Influenza - Quadrivalent *Preferred* (6 months+) (PF)    Need for vaccination for Strep pneumoniae  -     (In Office Administered) Pneumococcal Conjugate Vaccine (20 Valent) (IM)    There are no discontinued medications.    meds sent this encounter:  Medications Ordered This Encounter   Medications    augmented betamethasone dipropionate (DIPROLENE-AF) 0.05 % cream     Sig: Apply topically 2 (two) times daily.     Dispense:  50 g     Refill:  0    cephALEXin (KEFLEX) 500 MG capsule     Sig: Take 1 capsule (500 mg total) by mouth every 6 (six) hours. for 5 days     Dispense:  20 capsule     Refill:  0       Follow Up: No  follow-ups on file.    Subjective:     Chief Complaint   Patient presents with    Laceration       HPI  Tonya is a 42 y.o. female, last appointment with this clinic was Visit date not found.  Social History     Tobacco Use    Smoking status: Never    Smokeless tobacco: Never   Substance Use Topics    Alcohol use: No     Comment: social      Social History     Occupational History    Occupation: RN     Comment: Dialysis      Social History     Social History Narrative    Not on file       No LMP recorded.    Last visit with me 11/2021 for PEx  DM a1c 7.2 on max metformin and trulicity. Followed by outside gurmeet Thornton; messaged me in April - was started on new rx trijardy XR; I sent in diflucan for possibly vaginitis.  Dyslipid she wanted to work on TLC before agreeing to statin  Iron deficiency recommended OTC  Hx of lap band, saw bariatrics to re-establish in April. Plan is to readjust band after UGI  [facility] referred to ortho    Has been filling   Trulicity 4.5  Metformin 1000 bid    Hx of trijardy last filled 4/5/22    She was removing the ground her bathroom yesterday and the large knife slipped off a counter and fell and stabbed her left foot great toe.  It was pretty heavy so she thinks it went in maybe half an inch.  She applied pressure, but it continues to bleed.  It has been about 24 hours.  She put antibiotic ointment on it.  It is hurting, throbbing.  She has been taking Tylenol for it.  She is wondering if she needs antibiotics for it.  Last tetanus shot was around the 5 year luann    She has psoriasis of her fingers.  Was treated for onychomycosis without relief.  She was most recently given a topical steroid cream which she found the most helpful.  It is irritated and she is out of the steroid cream.  It actually looks like maybe they might have some superficial bleeding?    Reports her last eye exam was less than a year ago.  Reportedly normal.      Answers submitted by the patient for this  visit:  Review of Systems Questionnaire (Submitted on 2022)  activity change: No  hearing loss: No  trouble swallowing: No  eye discharge: No  chest tightness: No  wheezing: No  chest pain: No  palpitations: No  constipation: No  vomiting: No  diarrhea: No  difficulty urinating: No  hematuria: No  headaches: No  dysphoric mood: No      Patient Care Team:  Hernando Duncan MD as PCP - General (Internal Medicine)  Neri Peña OD as Physician (Optometry)  Franklyn Baum MA as Care Coordinator  Isabel Thornton MD as Consulting Physician (Endocrinology)    Patient Active Problem List    Diagnosis Date Noted    Status post gastric banding 2021    Iron deficiency/low ferritin; normal CBC 06/15/2021    Decreased ROM of right foot 12/15/2020    Decreased strength 12/15/2020    Postural imbalance 12/15/2020    Diabetic retinopathy of left eye associated with type 2 diabetes mellitus 2019    Obstructive sleep apnea 2019    Missed  2019    S/P suction dilation and curettage 2019    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  Review of patient's allergies indicates:   Allergen Reactions    Trijardy xr [empaglifloz-linaglip-metformin] Rash    Bydureon [exenatide microspheres]     Semaglutide        Medication List with Changes/Refills   Current Medications    BLOOD SUGAR DIAGNOSTIC (ONETOUCH ULTRA BLUE TEST STRIP) STRP    1 strip by Misc.(Non-Drug; Combo Route) route 4 (four) times daily.    CLOBETASOL 0.05% (TEMOVATE) 0.05 % OINT    APPLY A THIN LAYER TO THE AFFECTED AREA 2 TIMES PER DAY AS NEEDED    ERGOCALCIFEROL  "(ERGOCALCIFEROL) 50,000 UNIT CAP    Take 50,000 Units by mouth twice a week.    METFORMIN (GLUCOPHAGE) 1000 MG TABLET    Take 1 tablet (1,000 mg total) by mouth 2 (two) times daily with meals.    TRULICITY 4.5 MG/0.5 ML PEN INJECTOR          Review of Systems   HENT:  Negative for hearing loss.    Eyes:  Negative for discharge.   Respiratory:  Negative for wheezing.    Cardiovascular:  Negative for chest pain and palpitations.   Gastrointestinal:  Negative for constipation, diarrhea and vomiting.   Genitourinary:  Negative for hematuria.   Neurological:  Negative for headaches.     Objective:   Physical Exam   Vitals:    09/16/22 1352   BP: 118/80   BP Location: Left arm   Patient Position: Sitting   BP Method: Medium (Manual)   Pulse: 86   Temp: 98.3 °F (36.8 °C)   TempSrc: Oral   SpO2: 98%   Weight: 89.5 kg (197 lb 5 oz)   Height: 5' 8" (1.727 m)    Body mass index is 30 kg/m².  Weight: 89.5 kg (197 lb 5 oz)   Height: 5' 8" (172.7 cm)     Physical Exam  Constitutional:       General: She is not in acute distress.     Appearance: She is well-developed.   HENT:      Head: Normocephalic and atraumatic.   Eyes:      General: No scleral icterus.  Pulmonary:      Effort: Pulmonary effort is normal.   Musculoskeletal:      Comments: The left foot great toe, medial to the nail bed, is an approximately 10 mm laceration, with removing the gauze, continues to ooze blood.  No surrounding erythema.  No pus.   Skin:     General: Skin is warm and dry.      Comments: The tips of several of her fingers with hyperkeratosis, scale, with nail hypertrophy   Neurological:      Mental Status: She is alert and oriented to person, place, and time.   Psychiatric:         Behavior: Behavior normal.         Thought Content: Thought content normal.             "

## 2022-09-27 ENCOUNTER — PATIENT MESSAGE (OUTPATIENT)
Dept: ADMINISTRATIVE | Facility: HOSPITAL | Age: 42
End: 2022-09-27
Payer: COMMERCIAL

## 2022-09-27 ENCOUNTER — PATIENT OUTREACH (OUTPATIENT)
Dept: ADMINISTRATIVE | Facility: HOSPITAL | Age: 42
End: 2022-09-27
Payer: COMMERCIAL

## 2022-09-27 DIAGNOSIS — E11.319 TYPE 2 DIABETES MELLITUS WITH LEFT EYE AFFECTED BY RETINOPATHY WITHOUT MACULAR EDEMA, WITHOUT LONG-TERM CURRENT USE OF INSULIN, UNSPECIFIED RETINOPATHY SEVERITY: Primary | ICD-10-CM

## 2022-09-28 ENCOUNTER — PATIENT MESSAGE (OUTPATIENT)
Dept: OBSTETRICS AND GYNECOLOGY | Facility: CLINIC | Age: 42
End: 2022-09-28
Payer: COMMERCIAL

## 2022-09-30 ENCOUNTER — OFFICE VISIT (OUTPATIENT)
Dept: URGENT CARE | Facility: CLINIC | Age: 42
End: 2022-09-30
Payer: COMMERCIAL

## 2022-09-30 ENCOUNTER — PATIENT MESSAGE (OUTPATIENT)
Dept: FAMILY MEDICINE | Facility: CLINIC | Age: 42
End: 2022-09-30
Payer: COMMERCIAL

## 2022-09-30 VITALS
TEMPERATURE: 99 F | DIASTOLIC BLOOD PRESSURE: 89 MMHG | RESPIRATION RATE: 18 BRPM | HEIGHT: 68 IN | OXYGEN SATURATION: 98 % | HEART RATE: 96 BPM | WEIGHT: 197 LBS | SYSTOLIC BLOOD PRESSURE: 131 MMHG | BODY MASS INDEX: 29.86 KG/M2

## 2022-09-30 DIAGNOSIS — R03.0 ELEVATED BLOOD PRESSURE READING: ICD-10-CM

## 2022-09-30 DIAGNOSIS — K21.9 CHEST PAIN DUE TO GERD: ICD-10-CM

## 2022-09-30 DIAGNOSIS — R07.9 CHEST PAIN DUE TO GERD: ICD-10-CM

## 2022-09-30 DIAGNOSIS — Z20.822 ENCOUNTER FOR LABORATORY TESTING FOR COVID-19 VIRUS: Primary | ICD-10-CM

## 2022-09-30 LAB
CTP QC/QA: YES
SARS-COV-2 RDRP RESP QL NAA+PROBE: NEGATIVE

## 2022-09-30 PROCEDURE — U0002 COVID-19 LAB TEST NON-CDC: HCPCS | Mod: QW,S$GLB,, | Performed by: INTERNAL MEDICINE

## 2022-09-30 PROCEDURE — 99215 PR OFFICE/OUTPT VISIT, EST, LEVL V, 40-54 MIN: ICD-10-PCS | Mod: S$GLB,,, | Performed by: INTERNAL MEDICINE

## 2022-09-30 PROCEDURE — 99215 OFFICE O/P EST HI 40 MIN: CPT | Mod: S$GLB,,, | Performed by: INTERNAL MEDICINE

## 2022-09-30 PROCEDURE — U0002: ICD-10-PCS | Mod: QW,S$GLB,, | Performed by: INTERNAL MEDICINE

## 2022-09-30 RX ORDER — FAMOTIDINE 20 MG/1
20 TABLET, FILM COATED ORAL 2 TIMES DAILY
Qty: 60 TABLET | Refills: 11 | Status: SHIPPED | OUTPATIENT
Start: 2022-09-30 | End: 2022-10-07

## 2022-09-30 NOTE — PROGRESS NOTES
"Subjective:       Patient ID: Tonya Rivas is a 42 y.o. female.    Vitals:  height is 5' 8" (1.727 m) and weight is 89.4 kg (197 lb). Her tympanic temperature is 98.6 °F (37 °C). Her blood pressure is 131/89 and her pulse is 96. Her respiration is 18 and oxygen saturation is 98%.     Chief Complaint: Chest Pain (Blurred vision elevated BP - Entered by patient) and Hypertension     States that she was at work and stared with blurred vision so she took her pressure and it was elevated at 163/104. Pt states that shortly after she went to eat lunch (hot noodles) and had some chest discomfort. This subsided by time of arrival. Pt stated that she don't know if it was indigestion. She is active and excercises weekly without shortness of breath or chest pain. Non smoker. She does have diabetic retinopathy by chart review.     She had some blurred vision for about 1 minute today at work, this subsided. No associated numbness/weakness, vision loss.     Chest Pain   This is a new problem. The current episode started today. The onset quality is sudden. The problem has been unchanged. The pain is at a severity of 5/10. The pain is mild. The quality of the pain is described as sharp. The pain does not radiate.     Cardiovascular:  Positive for chest pain.   Skin:  Negative for erythema.     Objective:      Physical Exam   Constitutional: She is oriented to person, place, and time. She appears well-developed.  Non-toxic appearance. She does not appear ill. No distress.   HENT:   Head: Normocephalic and atraumatic.   Ears:   Right Ear: External ear normal.   Left Ear: External ear normal.   Nose: Nose normal.   Mouth/Throat: Oropharynx is clear and moist. Mucous membranes are moist. No oropharyngeal exudate or posterior oropharyngeal erythema. Oropharynx is clear.   Eyes: Conjunctivae and EOM are normal. Pupils are equal, round, and reactive to light. Right eye exhibits no discharge. Left eye exhibits no discharge. No " scleral icterus.   Neck: Neck supple. No neck rigidity present.   Cardiovascular: Normal rate, regular rhythm and normal heart sounds.   No murmur heard.Exam reveals no gallop and no friction rub.   Pulmonary/Chest: Effort normal. No respiratory distress. She has no wheezes. She has no rales. She exhibits no tenderness.   Abdominal: Bowel sounds are normal. She exhibits no distension. Soft.   Lymphadenopathy:     She has no cervical adenopathy.   Neurological: no focal deficit. She is alert, oriented to person, place, and time and at baseline. She displays no weakness. No cranial nerve deficit or sensory deficit. Coordination and gait normal.   Skin: Skin is warm, dry, not diaphoretic and no rash. Capillary refill takes less than 2 seconds. No erythema   Psychiatric: Her behavior is normal. Mood and thought content normal.   Nursing note and vitals reviewed.    Visual acuity 20/25 in right and left eye alone and 20/20 with both.     EKG reviewed. NSR. No stemi, no evidence of ischemia. Normal appearing EKG.   Assessment:       1. Encounter for laboratory testing for COVID-19 virus    2. Chest pain due to GERD    3. Elevated blood pressure reading          Plan:         Encounter for laboratory testing for COVID-19 virus  -     POCT COVID-19 Rapid Screening    Chest pain due to GERD  -     famotidine (PEPCID) 20 MG tablet; Take 1 tablet (20 mg total) by mouth 2 (two) times daily.  Dispense: 60 tablet; Refill: 11    Elevated blood pressure reading       I think blurry vision possibly related to elevated blood pressure as it was 160/100 at the time and improved by time in clinic. Was 130/80 at discharge. Will follow up with PCP regarding BP. ED preacutions discussed. Chest pain likely GERD related. No chest pain during exercise. Low risk. EKG reassuring. ED precautions given.

## 2022-09-30 NOTE — PATIENT INSTRUCTIONS
Follow up with PCP next week. Take blood pressure daily and keep log. Start pepcid 20 mg twice daily for 5 days.     Go to ER if you have severe left sided chest pain that radiates to jaw/shoulder/arm, shortness of breath, numbness, tingling, visual loss.

## 2022-10-06 ENCOUNTER — PATIENT MESSAGE (OUTPATIENT)
Dept: BARIATRICS | Facility: CLINIC | Age: 42
End: 2022-10-06
Payer: COMMERCIAL

## 2022-10-07 ENCOUNTER — OFFICE VISIT (OUTPATIENT)
Dept: FAMILY MEDICINE | Facility: CLINIC | Age: 42
End: 2022-10-07
Payer: COMMERCIAL

## 2022-10-07 ENCOUNTER — LAB VISIT (OUTPATIENT)
Dept: LAB | Facility: HOSPITAL | Age: 42
End: 2022-10-07
Attending: FAMILY MEDICINE
Payer: COMMERCIAL

## 2022-10-07 ENCOUNTER — PATIENT MESSAGE (OUTPATIENT)
Dept: FAMILY MEDICINE | Facility: CLINIC | Age: 42
End: 2022-10-07

## 2022-10-07 DIAGNOSIS — B35.1 ONYCHOMYCOSIS: ICD-10-CM

## 2022-10-07 DIAGNOSIS — B35.1 ONYCHOMYCOSIS: Primary | ICD-10-CM

## 2022-10-07 LAB
ALBUMIN SERPL BCP-MCNC: 4.1 G/DL (ref 3.5–5.2)
ALP SERPL-CCNC: 70 U/L (ref 55–135)
ALT SERPL W/O P-5'-P-CCNC: 17 U/L (ref 10–44)
ANION GAP SERPL CALC-SCNC: 4 MMOL/L (ref 8–16)
AST SERPL-CCNC: 19 U/L (ref 10–40)
BILIRUB SERPL-MCNC: 0.6 MG/DL (ref 0.1–1)
BUN SERPL-MCNC: 10 MG/DL (ref 6–20)
CALCIUM SERPL-MCNC: 9.4 MG/DL (ref 8.7–10.5)
CHLORIDE SERPL-SCNC: 106 MMOL/L (ref 95–110)
CO2 SERPL-SCNC: 27 MMOL/L (ref 23–29)
CREAT SERPL-MCNC: 0.7 MG/DL (ref 0.5–1.4)
EST. GFR  (NO RACE VARIABLE): >60 ML/MIN/1.73 M^2
GLUCOSE SERPL-MCNC: 164 MG/DL (ref 70–110)
POTASSIUM SERPL-SCNC: 4.8 MMOL/L (ref 3.5–5.1)
PROT SERPL-MCNC: 7.4 G/DL (ref 6–8.4)
SODIUM SERPL-SCNC: 137 MMOL/L (ref 136–145)

## 2022-10-07 PROCEDURE — 99213 OFFICE O/P EST LOW 20 MIN: CPT | Mod: 95,,, | Performed by: FAMILY MEDICINE

## 2022-10-07 PROCEDURE — 36415 COLL VENOUS BLD VENIPUNCTURE: CPT | Mod: PN | Performed by: FAMILY MEDICINE

## 2022-10-07 PROCEDURE — 99213 PR OFFICE/OUTPT VISIT, EST, LEVL III, 20-29 MIN: ICD-10-PCS | Mod: 95,,, | Performed by: FAMILY MEDICINE

## 2022-10-07 PROCEDURE — 80053 COMPREHEN METABOLIC PANEL: CPT | Performed by: FAMILY MEDICINE

## 2022-10-07 RX ORDER — TERBINAFINE HYDROCHLORIDE 250 MG/1
250 TABLET ORAL DAILY
Qty: 45 TABLET | Refills: 0 | Status: SHIPPED | OUTPATIENT
Start: 2022-10-07 | End: 2022-11-21

## 2022-10-07 NOTE — PROGRESS NOTES
Subjective:       Patient ID: Tonya Rivas is a 42 y.o. female.    Chief Complaint: No chief complaint on file.    The patient location is: [place]  The chief complaint leading to consultation is: nail fungus    Visit type: audiovisual    Face to Face time with patient:   5 minutes of total time spent on the encounter, which includes face to face time and non-face to face time preparing to see the patient (eg, review of tests), Obtaining and/or reviewing separately obtained history, Documenting clinical information in the electronic or other health record, Independently interpreting results (not separately reported) and communicating results to the patient/family/caregiver, or Care coordination (not separately reported).         Each patient to whom he or she provides medical services by telemedicine is:  (1) informed of the relationship between the physician and patient and the respective role of any other health care provider with respect to management of the patient; and (2) notified that he or she may decline to receive medical services by telemedicine and may withdraw from such care at any time.    Notes:   42 year old female presents for a toenail fungus. She states she had her fingernails removed and has greenish discoloration on her fingernails. She has had this in the past and was treated with lamisil, which was effective.         Review of Systems   Constitutional:  Negative for activity change.   HENT:  Negative for hearing loss and trouble swallowing.    Eyes:  Negative for discharge.   Respiratory:  Negative for chest tightness and wheezing.    Cardiovascular:  Negative for chest pain and palpitations.   Gastrointestinal:  Negative for constipation, diarrhea and vomiting.   Genitourinary:  Negative for difficulty urinating and hematuria.   Neurological:  Negative for headaches.   Psychiatric/Behavioral:  Negative for dysphoric mood.        Objective:      Physical Exam    Assessment:        Problem List Items Addressed This Visit    None  Visit Diagnoses       Onychomycosis    -  Primary    Relevant Orders    Comprehensive Metabolic Panel              Plan:       Diagnoses and all orders for this visit:    Onychomycosis  -     Comprehensive Metabolic Panel; Future        Terbinafine once a day for 6 weeks if she has normal LFT's.

## 2022-10-07 NOTE — PROGRESS NOTES
Answers submitted by the patient for this visit:  Review of Systems Questionnaire (Submitted on 10/6/2022)  activity change: No  hearing loss: No  trouble swallowing: No  eye discharge: No  chest tightness: No  wheezing: No  chest pain: No  palpitations: No  constipation: No  vomiting: No  diarrhea: No  difficulty urinating: No  hematuria: No  headaches: No  dysphoric mood: No

## 2022-10-11 ENCOUNTER — PATIENT MESSAGE (OUTPATIENT)
Dept: ADMINISTRATIVE | Facility: HOSPITAL | Age: 42
End: 2022-10-11
Payer: COMMERCIAL

## 2022-10-20 ENCOUNTER — OFFICE VISIT (OUTPATIENT)
Dept: OBSTETRICS AND GYNECOLOGY | Facility: CLINIC | Age: 42
End: 2022-10-20
Payer: COMMERCIAL

## 2022-10-20 VITALS — BODY MASS INDEX: 29.7 KG/M2 | SYSTOLIC BLOOD PRESSURE: 120 MMHG | DIASTOLIC BLOOD PRESSURE: 78 MMHG | WEIGHT: 195.31 LBS

## 2022-10-20 DIAGNOSIS — Z30.2 REQUEST FOR STERILIZATION: ICD-10-CM

## 2022-10-20 DIAGNOSIS — Z12.4 ENCOUNTER FOR PAPANICOLAOU SMEAR FOR CERVICAL CANCER SCREENING: ICD-10-CM

## 2022-10-20 DIAGNOSIS — Z01.419 WELL WOMAN EXAM WITH ROUTINE GYNECOLOGICAL EXAM: Primary | ICD-10-CM

## 2022-10-20 PROCEDURE — 99386 PR PREVENTIVE VISIT,NEW,40-64: ICD-10-PCS | Mod: S$GLB,,, | Performed by: OBSTETRICS & GYNECOLOGY

## 2022-10-20 PROCEDURE — 99999 PR PBB SHADOW E&M-EST. PATIENT-LVL III: ICD-10-PCS | Mod: PBBFAC,,, | Performed by: OBSTETRICS & GYNECOLOGY

## 2022-10-20 PROCEDURE — 87624 HPV HI-RISK TYP POOLED RSLT: CPT | Performed by: OBSTETRICS & GYNECOLOGY

## 2022-10-20 PROCEDURE — 99999 PR PBB SHADOW E&M-EST. PATIENT-LVL III: CPT | Mod: PBBFAC,,, | Performed by: OBSTETRICS & GYNECOLOGY

## 2022-10-20 PROCEDURE — 88175 CYTOPATH C/V AUTO FLUID REDO: CPT | Performed by: OBSTETRICS & GYNECOLOGY

## 2022-10-20 PROCEDURE — 99386 PREV VISIT NEW AGE 40-64: CPT | Mod: S$GLB,,, | Performed by: OBSTETRICS & GYNECOLOGY

## 2022-10-20 NOTE — PROGRESS NOTES
"  SUBJECTIVE:   Tonya Rivas is a 42 y.o. female   for annual well woman exam. Patient's last menstrual period was 10/03/2022 (exact date)..  She has no unusual complaints.      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     Diabetes mellitus type II     uncontrolled    Herpes simplex virus (HSV) infection     "cold sores"    History of  delivery, currently pregnant x2     Kidney stones     Obesity 2013     Past Surgical History:   Procedure Laterality Date    ABDOMINOPLASTY N/A 2021    Procedure: ABDOMINOPLASTY;  Surgeon: Víctor Weston MD;  [facility];  Service: Plastics;  Laterality: N/A;  with routine liposuction     SECTION      x3    DILATION AND CURETTAGE OF UTERUS USING SUCTION N/A 2019    Procedure: DILATION AND CURETTAGE, UTERUS, USING SUCTION;  Surgeon: Tonya Cavanaugh MD;  Location: [facility] OR;  Service: OB/GYN;  Laterality: N/A;    EXTRACORPOREAL SHOCK WAVE LITHOTRIPSY      LAPAROSCOPIC GASTRIC BANDING      LIPOSUCTION OF ABDOMEN N/A 2021    Procedure: LIPOSUCTION, ABDOMEN;  Surgeon: Víctor Weston MD;  [facility];  Service: Plastics;  Laterality: N/A;     Social History     Socioeconomic History    Marital status:    Occupational History    Occupation: RN     Comment: Dialysis   Tobacco Use    Smoking status: Never    Smokeless tobacco: Never   Substance and Sexual Activity    Alcohol use: No     Comment: social    Drug use: No    Sexual activity: Yes     Partners: Male     Birth control/protection: None   Other Topics Concern    Are you pregnant or think you may be? No    Breast-feeding No     Social Determinants of Health     Financial Resource Strain: Low Risk     Difficulty of Paying Living Expenses: Not hard at all " "  Food Insecurity: Unknown    Worried About Running Out of Food in the Last Year: Never true    Ran Out of Food in the Last Year: Patient refused   Transportation Needs: Unknown    Lack of Transportation (Medical): Patient refused    Lack of Transportation (Non-Medical): Patient refused   Physical Activity: Insufficiently Active    Days of Exercise per Week: 2 days    Minutes of Exercise per Session: 30 min   Stress: No Stress Concern Present    Feeling of Stress : Not at all   Social Connections: Unknown    Frequency of Communication with Friends and Family: More than three times a week    Frequency of Social Gatherings with Friends and Family: Three times a week    Active Member of Clubs or Organizations: Yes    Attends Club or Organization Meetings: 1 to 4 times per year    Marital Status:    Housing Stability: Unknown    Unable to Pay for Housing in the Last Year: Patient refused    Number of Places Lived in the Last Year: 1    Unstable Housing in the Last Year: Patient refused     Family History   Problem Relation Age of Onset    Cancer Mother     Learning disabilities Son     Heart disease Maternal Grandfather     Breast cancer Maternal Grandmother     Colon cancer Neg Hx     Ovarian cancer Neg Hx      OB History    Para Term  AB Living   5 3 0 3 2 2   SAB IAB Ectopic Multiple Live Births   1 1     2      # Outcome Date GA Lbr Darío/2nd Weight Sex Delivery Anes PTL Lv   5  07 35w0d  3.005 kg (6 lb 10 oz) F CS-LTranv  Y APOLINAR      Birth Comments: diagnosed with GDM in this pregnancy, PreE      Complications: Pre-eclampsia, Gestational diabetes   4  01 26w0d  3.005 kg (6 lb 10 oz) F CS-LTranv Spinal Y APOLINAR      Birth Comments: c-s @ 26 wks due to Pre-E, "Magali"      Complications: Pre-eclampsia   3  93 25w0d  0.709 kg (1 lb 9 oz) M CS-LTranv  Y FD      Birth Comments: pt reports c-s due to Pre-E, son - "Theodore" Lissencephaly - passed at 5 years old      " Complications: Pre-eclampsia   2 SAB            1 IAB               Obstetric Comments   Gynhx; reg   H/o cold sore   Denies abnl pap         Current Outpatient Medications   Medication Sig Dispense Refill    augmented betamethasone dipropionate (DIPROLENE-AF) 0.05 % cream Apply topically 2 (two) times daily. 50 g 0    blood sugar diagnostic (ONETOUCH ULTRA BLUE TEST STRIP) Strp 1 strip by Misc.(Non-Drug; Combo Route) route 4 (four) times daily. 360 strip 3    clobetasol 0.05% (TEMOVATE) 0.05 % Oint APPLY A THIN LAYER TO THE AFFECTED AREA 2 TIMES PER DAY AS NEEDED      ergocalciferol (ERGOCALCIFEROL) 50,000 unit Cap Take 50,000 Units by mouth twice a week.      metFORMIN (GLUCOPHAGE) 1000 MG tablet Take 1 tablet (1,000 mg total) by mouth 2 (two) times daily with meals. 180 tablet 3    terbinafine HCL (LAMISIL) 250 mg tablet Take 1 tablet (250 mg total) by mouth once daily. 45 tablet 0    TRULICITY 4.5 mg/0.5 mL pen injector        No current facility-administered medications for this visit.     Allergies: Trijardy xr [empaglifloz-linaglip-metformin], Bydureon [exenatide microspheres], and Semaglutide       ROS:  GENERAL: Denies weight gain or weight loss. Feeling well overall.   SKIN: Denies rash or lesions.   HEAD: Denies head injury or headache.   NODES: Denies enlarged lymph nodes.   CHEST: Denies chest pain or shortness of breath.   CARDIOVASCULAR: Denies palpitations or left sided chest pain.   ABDOMEN: No abdominal pain, constipation, diarrhea, nausea, vomiting or rectal bleeding.   URINARY: No frequency, dysuria, hematuria, or burning on urination.  REPRODUCTIVE: Denies vaginal discharge, abnormal vaginal bleeding, lesions, pelvic pain  BREASTS: The patient performs breast self-examination and denies pain, lumps, or nipple discharge.   HEMATOLOGIC: No easy bruisability or excessive bleeding.  MUSCULOSKELETAL: Denies joint pain or swelling.   NEUROLOGIC: Denies syncope or weakness.   PSYCHIATRIC: Denies  depression, anxiety or mood swings.      OBJECTIVE:   /78   Wt 88.6 kg (195 lb 5.2 oz)   LMP 10/03/2022 (Exact Date)   BMI 29.70 kg/m²   The patient appears well, alert, oriented x 3, in no distress.  PSYCH:  Normal, full range of affect  NECK: negative, no thyromegaly, trachea midline  SKIN: normal, good color, good turgor and no acne, striae, hirsutism  BREAST EXAM: breasts appear normal, no suspicious masses, no skin or nipple changes or axillary nodes  ABDOMEN: soft, non-tender; bowel sounds normal; no masses,  no organomegaly and no hernias, masses, or hepatosplenomegaly  GENITALIA: normal external genitalia, no erythema, no discharge  BLADDER: soft  URETHRA: normal appearing urethra with no masses, tenderness or lesions and normal urethra, normal urethral meatus  VAGINA: Normal  CERVIX: no lesions or cervical motion tenderness  UTERUS: normal size, contour, position, consistency, mobility, non-tender  ADNEXA: no mass, fullness, tenderness      ASSESSMENT:     1. Health maintenance  -pap done. Discussed ASCCP guideline screening every 3 - 5 years.   -screening MMG scheduled  -counseled on exercise and healthy diet, weight loss  -bone health:  Discussed Vitamin D and Calcium supplementation, weight bearing exercises  2.  Discussed safety at home/school/work, healthy and balanced diet, sleep hygiene, as well as violence/weapons exposure.   3.  Counseled with patient that BTL is permanent, reversal is expensive and often unsuccessful.  Pt desires to proceed. Surgery is scheduled for 12/6/22.

## 2022-10-21 ENCOUNTER — PATIENT MESSAGE (OUTPATIENT)
Dept: FAMILY MEDICINE | Facility: CLINIC | Age: 42
End: 2022-10-21
Payer: COMMERCIAL

## 2022-10-21 DIAGNOSIS — E11.319 TYPE 2 DIABETES MELLITUS WITH LEFT EYE AFFECTED BY RETINOPATHY WITHOUT MACULAR EDEMA, WITHOUT LONG-TERM CURRENT USE OF INSULIN, UNSPECIFIED RETINOPATHY SEVERITY: Primary | ICD-10-CM

## 2022-10-21 RX ORDER — DULAGLUTIDE 3 MG/.5ML
3 INJECTION, SOLUTION SUBCUTANEOUS
Qty: 1 PEN | Refills: 0 | Status: SHIPPED | OUTPATIENT
Start: 2022-10-21 | End: 2022-10-28

## 2022-10-26 LAB
HPV HR 12 DNA SPEC QL NAA+PROBE: NEGATIVE
HPV16 AG SPEC QL: NEGATIVE
HPV18 DNA SPEC QL NAA+PROBE: NEGATIVE

## 2022-10-27 LAB
FINAL PATHOLOGIC DIAGNOSIS: NORMAL
Lab: NORMAL

## 2022-11-01 NOTE — PROGRESS NOTES
Hi,     Your pap smear is normal.  I will see you next year for your well woman exam.     Let me know if you have any questions.  Thanks.     Dr. Beverley France

## 2022-11-04 ENCOUNTER — PATIENT MESSAGE (OUTPATIENT)
Dept: BARIATRICS | Facility: CLINIC | Age: 42
End: 2022-11-04
Payer: COMMERCIAL

## 2022-11-08 ENCOUNTER — HOSPITAL ENCOUNTER (OUTPATIENT)
Dept: RADIOLOGY | Facility: HOSPITAL | Age: 42
Discharge: HOME | End: 2022-11-08
Attending: INTERNAL MEDICINE
Payer: COMMERCIAL

## 2022-11-08 DIAGNOSIS — Z12.31 OTHER SCREENING MAMMOGRAM: ICD-10-CM

## 2022-11-08 PROCEDURE — 77067 SCR MAMMO BI INCL CAD: CPT | Mod: TC

## 2022-11-08 PROCEDURE — 77063 BREAST TOMOSYNTHESIS BI: CPT | Mod: TC

## 2022-11-08 PROCEDURE — 77067 MAMMO DIGITAL SCREENING BILAT WITH TOMO: ICD-10-PCS | Mod: 26,,, | Performed by: RADIOLOGY

## 2022-11-08 PROCEDURE — 77067 SCR MAMMO BI INCL CAD: CPT | Mod: 26,,, | Performed by: RADIOLOGY

## 2022-11-08 PROCEDURE — 77063 BREAST TOMOSYNTHESIS BI: CPT | Mod: 26,,, | Performed by: RADIOLOGY

## 2022-11-08 PROCEDURE — 77063 MAMMO DIGITAL SCREENING BILAT WITH TOMO: ICD-10-PCS | Mod: 26,,, | Performed by: RADIOLOGY

## 2022-11-10 ENCOUNTER — LAB VISIT (OUTPATIENT)
Dept: LAB | Facility: HOSPITAL | Age: 42
End: 2022-11-10
Attending: DERMATOLOGY
Payer: COMMERCIAL

## 2022-11-10 ENCOUNTER — OFFICE VISIT (OUTPATIENT)
Dept: DERMATOLOGY | Facility: CLINIC | Age: 42
End: 2022-11-10
Payer: COMMERCIAL

## 2022-11-10 DIAGNOSIS — Z51.81 MEDICATION MONITORING ENCOUNTER: Primary | ICD-10-CM

## 2022-11-10 DIAGNOSIS — Z51.81 MEDICATION MONITORING ENCOUNTER: ICD-10-CM

## 2022-11-10 DIAGNOSIS — L60.3 ONYCHODYSTROPHY: ICD-10-CM

## 2022-11-10 DIAGNOSIS — A49.8 PSEUDOMONAS INFECTION: ICD-10-CM

## 2022-11-10 PROCEDURE — 36415 COLL VENOUS BLD VENIPUNCTURE: CPT | Mod: PN | Performed by: DERMATOLOGY

## 2022-11-10 PROCEDURE — 99204 OFFICE O/P NEW MOD 45 MIN: CPT | Mod: S$GLB,,, | Performed by: DERMATOLOGY

## 2022-11-10 PROCEDURE — 99204 PR OFFICE/OUTPT VISIT, NEW, LEVL IV, 45-59 MIN: ICD-10-PCS | Mod: S$GLB,,, | Performed by: DERMATOLOGY

## 2022-11-10 PROCEDURE — 80076 HEPATIC FUNCTION PANEL: CPT | Performed by: DERMATOLOGY

## 2022-11-10 PROCEDURE — 99999 PR PBB SHADOW E&M-EST. PATIENT-LVL III: CPT | Mod: PBBFAC,,, | Performed by: DERMATOLOGY

## 2022-11-10 PROCEDURE — 99999 PR PBB SHADOW E&M-EST. PATIENT-LVL III: ICD-10-PCS | Mod: PBBFAC,,, | Performed by: DERMATOLOGY

## 2022-11-10 RX ORDER — GENTAMICIN SULFATE 1 MG/G
OINTMENT TOPICAL 2 TIMES DAILY
Qty: 30 G | Refills: 1 | Status: SHIPPED | OUTPATIENT
Start: 2022-11-10 | End: 2023-09-06

## 2022-11-10 RX ORDER — AMMONIUM LACTATE 12 G/100G
CREAM TOPICAL
Qty: 140 G | Refills: 0 | Status: SHIPPED | OUTPATIENT
Start: 2022-11-10 | End: 2022-11-10

## 2022-11-10 RX ORDER — AMMONIUM LACTATE 12 G/100G
CREAM TOPICAL
Qty: 140 G | Refills: 0 | Status: SHIPPED | OUTPATIENT
Start: 2022-11-10

## 2022-11-10 NOTE — PATIENT INSTRUCTIONS
Instructed patient to avoid hot water on the fingernails and toenails and to cut them short.  Can try Amlactin or Urea cream nightly and to watch for skin irritation.  If this occurs, use less often.  Discussed that toenails changes are common after the age of thirty with decreased blood flow and venous back pressure.  Fingernail changes can be spontaneous also with changes later in life.  This may be a chronic condition without much improvement with limited treatments.  Can also use petroleum jelly regularly.  No more artifical nails or press ons and no more polish.  Also reviewed that fungal infection is unlikely to be the primary issue.  Offered oral antifungal therapy with risks of liver irritation and risks of recurrence of nail changes, especially as primary architectural changes are suspected.    Recommended more hand  than water washing for routine germ prevention.

## 2022-11-10 NOTE — PROGRESS NOTES
Subjective:       Patient ID:  Tonya Rivas is a 42 y.o. female who presents for   Chief Complaint   Patient presents with    Nail Problem     Finger nails, X1mo, fungus, [place] Lamisil      Nail Problem - Initial  Affected locations: right fingers and left fingers  Duration: 1 month  Severity: mild to moderate  Timing: constant      Review of Systems   Constitutional: Negative.    HENT: Negative.     Respiratory: Negative.     Musculoskeletal: Negative.       Objective:    Physical Exam   Constitutional: She appears well-developed and well-nourished.   Eyes: No conjunctival no injection.   Neurological: She is alert and oriented to person, place, and time.   Psychiatric: She has a normal mood and affect.   Skin:              Diagram Legend     Erythematous scaling macule/papule c/w actinic keratosis       Vascular papule c/w angioma      Pigmented verrucoid papule/plaque c/w seborrheic keratosis      Yellow umbilicated papule c/w sebaceous hyperplasia      Irregularly shaped tan macule c/w lentigo     1-2 mm smooth white papules consistent with Milia      Movable subcutaneous cyst with punctum c/w epidermal inclusion cyst      Subcutaneous movable cyst c/w pilar cyst      Firm pink to brown papule c/w dermatofibroma      Pedunculated fleshy papule(s) c/w skin tag(s)      Evenly pigmented macule c/w junctional nevus     Mildly variegated pigmented, slightly irregular-bordered macule c/w mildly atypical nevus      Flesh colored to evenly pigmented papule c/w intradermal nevus       Pink pearly papule/plaque c/w basal cell carcinoma      Erythematous hyperkeratotic cursted plaque c/w SCC      Surgical scar with no sign of skin cancer recurrence      Open and closed comedones      Inflammatory papules and pustules      Verrucoid papule consistent consistent with wart     Erythematous eczematous patches and plaques     Dystrophic onycholytic nail with subungual debris c/w onychomycosis     Umbilicated papule     Erythematous-base heme-crusted tan verrucoid plaque consistent with inflamed seborrheic keratosis     Erythematous Silvery Scaling Plaque c/w Psoriasis     See annotation                        Assessment / Plan:        Medication monitoring encounter  -     Hepatic Function Panel; Future; Expected date: 11/10/2022  Discussed with patient the need for laboratory work up for further evaluation.  Discussed that such investigation may not be helpful.    Onychodystrophy  -     Discontinue: ammonium lactate 12 % Crea; Bid to nails  Dispense: 140 g; Refill: 0  -     ammonium lactate 12 % Crea; Qhs nails and underneath nails  Dispense: 140 g; Refill: 0  Stop po lamisil.  No fungus noted.  Instructed patient to avoid hot water on the fingernails and toenails and to cut them short.  Can try Amlactin or Urea cream nightly and to watch for skin irritation.  If this occurs, use less often.  Discussed that toenails changes are common after the age of thirty with decreased blood flow and venous back pressure.  Fingernail changes can be spontaneous also with changes later in life.  This may be a chronic condition without much improvement with limited treatments.  Can also use petroleum jelly regularly.  No more artifical nails or press ons and no more polish.  Also reviewed that fungal infection is unlikely to be the primary issue.  Offered oral antifungal therapy with risks of liver irritation and risks of recurrence of nail changes, especially as primary architectural changes are suspected.  Reviewed with patient different treatment options and associated risks.  Proper application of medications and or care for affected area(s) and condition(s) reviewed.    Pseudomonas infection  -     Discontinue: ammonium lactate 12 % Crea; Bid to nails  Dispense: 140 g; Refill: 0  -     ammonium lactate 12 % Crea; Qhs nails and underneath nails  Dispense: 140 g; Refill: 0  -     gentamicin (GARAMYCIN) 0.1 % ointment; Apply topically 2 (two)  times daily. Qam and qdinner to nails and underneath naiils  Dispense: 30 g; Refill: 1  Chloronychia.  Reviewed with patient different treatment options and associated risks.  Proper application of medications and or care for affected area(s) and condition(s) reviewed.           Follow up in 2 months (on 1/10/2023), or if symptoms worsen or fail to improve.

## 2022-11-11 LAB
ALBUMIN SERPL BCP-MCNC: 3.9 G/DL (ref 3.5–5.2)
ALP SERPL-CCNC: 64 U/L (ref 55–135)
ALT SERPL W/O P-5'-P-CCNC: 17 U/L (ref 10–44)
AST SERPL-CCNC: 18 U/L (ref 10–40)
BILIRUB DIRECT SERPL-MCNC: 0.2 MG/DL (ref 0.1–0.3)
BILIRUB SERPL-MCNC: 0.6 MG/DL (ref 0.1–1)
PROT SERPL-MCNC: 6.9 G/DL (ref 6–8.4)

## 2022-11-16 ENCOUNTER — PATIENT MESSAGE (OUTPATIENT)
Dept: FAMILY MEDICINE | Facility: CLINIC | Age: 42
End: 2022-11-16
Payer: COMMERCIAL

## 2022-11-16 DIAGNOSIS — B37.31 VAGINAL CANDIDIASIS: Primary | ICD-10-CM

## 2022-11-16 RX ORDER — FLUCONAZOLE 150 MG/1
150 TABLET ORAL ONCE
Qty: 1 TABLET | Refills: 0 | Status: SHIPPED | OUTPATIENT
Start: 2022-11-16 | End: 2023-02-02

## 2022-11-17 ENCOUNTER — PATIENT MESSAGE (OUTPATIENT)
Dept: DERMATOLOGY | Facility: CLINIC | Age: 42
End: 2022-11-17
Payer: COMMERCIAL

## 2022-12-05 ENCOUNTER — PATIENT MESSAGE (OUTPATIENT)
Dept: OBSTETRICS AND GYNECOLOGY | Facility: CLINIC | Age: 42
End: 2022-12-05
Payer: COMMERCIAL

## 2022-12-07 ENCOUNTER — PATIENT MESSAGE (OUTPATIENT)
Dept: BARIATRICS | Facility: CLINIC | Age: 42
End: 2022-12-07
Payer: COMMERCIAL

## 2023-01-09 ENCOUNTER — PATIENT MESSAGE (OUTPATIENT)
Dept: ADMINISTRATIVE | Facility: HOSPITAL | Age: 43
End: 2023-01-09
Payer: COMMERCIAL

## 2023-01-09 ENCOUNTER — PATIENT MESSAGE (OUTPATIENT)
Dept: BARIATRICS | Facility: CLINIC | Age: 43
End: 2023-01-09
Payer: COMMERCIAL

## 2023-02-09 ENCOUNTER — PATIENT MESSAGE (OUTPATIENT)
Dept: BARIATRICS | Facility: CLINIC | Age: 43
End: 2023-02-09
Payer: COMMERCIAL

## 2023-02-14 ENCOUNTER — PATIENT MESSAGE (OUTPATIENT)
Dept: BARIATRICS | Facility: CLINIC | Age: 43
End: 2023-02-14
Payer: COMMERCIAL

## 2023-02-22 ENCOUNTER — PATIENT MESSAGE (OUTPATIENT)
Dept: BARIATRICS | Facility: CLINIC | Age: 43
End: 2023-02-22
Payer: COMMERCIAL

## 2023-03-08 ENCOUNTER — PATIENT MESSAGE (OUTPATIENT)
Dept: BARIATRICS | Facility: CLINIC | Age: 43
End: 2023-03-08
Payer: COMMERCIAL

## 2023-03-13 ENCOUNTER — PATIENT OUTREACH (OUTPATIENT)
Dept: ADMINISTRATIVE | Facility: HOSPITAL | Age: 43
End: 2023-03-13
Payer: COMMERCIAL

## 2023-03-14 ENCOUNTER — PATIENT MESSAGE (OUTPATIENT)
Dept: BARIATRICS | Facility: CLINIC | Age: 43
End: 2023-03-14
Payer: COMMERCIAL

## 2023-03-24 ENCOUNTER — PATIENT MESSAGE (OUTPATIENT)
Dept: ADMINISTRATIVE | Facility: HOSPITAL | Age: 43
End: 2023-03-24
Payer: COMMERCIAL

## 2023-04-05 ENCOUNTER — PATIENT MESSAGE (OUTPATIENT)
Dept: BARIATRICS | Facility: CLINIC | Age: 43
End: 2023-04-05
Payer: COMMERCIAL

## 2023-04-07 ENCOUNTER — PATIENT MESSAGE (OUTPATIENT)
Dept: DERMATOLOGY | Facility: CLINIC | Age: 43
End: 2023-04-07
Payer: COMMERCIAL

## 2023-04-11 ENCOUNTER — PATIENT MESSAGE (OUTPATIENT)
Dept: BARIATRICS | Facility: CLINIC | Age: 43
End: 2023-04-11
Payer: COMMERCIAL

## 2023-04-14 ENCOUNTER — OFFICE VISIT (OUTPATIENT)
Dept: FAMILY MEDICINE | Facility: CLINIC | Age: 43
End: 2023-04-14
Payer: COMMERCIAL

## 2023-04-14 VITALS
OXYGEN SATURATION: 99 % | DIASTOLIC BLOOD PRESSURE: 76 MMHG | BODY MASS INDEX: 30.87 KG/M2 | SYSTOLIC BLOOD PRESSURE: 128 MMHG | TEMPERATURE: 98 F | HEART RATE: 99 BPM | WEIGHT: 203.69 LBS | HEIGHT: 68 IN

## 2023-04-14 DIAGNOSIS — E11.319 TYPE 2 DIABETES MELLITUS WITH LEFT EYE AFFECTED BY RETINOPATHY WITHOUT MACULAR EDEMA, WITHOUT LONG-TERM CURRENT USE OF INSULIN, UNSPECIFIED RETINOPATHY SEVERITY: ICD-10-CM

## 2023-04-14 DIAGNOSIS — R10.2 PELVIC PAIN: ICD-10-CM

## 2023-04-14 DIAGNOSIS — R35.0 FREQUENCY OF URINATION: Primary | ICD-10-CM

## 2023-04-14 PROCEDURE — 81514 NFCT DS BV&VAGINITIS DNA ALG: CPT | Performed by: NURSE PRACTITIONER

## 2023-04-14 PROCEDURE — 99214 OFFICE O/P EST MOD 30 MIN: CPT | Mod: S$GLB,,, | Performed by: NURSE PRACTITIONER

## 2023-04-14 PROCEDURE — 87591 N.GONORRHOEAE DNA AMP PROB: CPT | Performed by: NURSE PRACTITIONER

## 2023-04-14 PROCEDURE — 87086 URINE CULTURE/COLONY COUNT: CPT | Performed by: NURSE PRACTITIONER

## 2023-04-14 PROCEDURE — 99999 PR PBB SHADOW E&M-EST. PATIENT-LVL IV: ICD-10-PCS | Mod: PBBFAC,,, | Performed by: NURSE PRACTITIONER

## 2023-04-14 PROCEDURE — 99214 PR OFFICE/OUTPT VISIT, EST, LEVL IV, 30-39 MIN: ICD-10-PCS | Mod: S$GLB,,, | Performed by: NURSE PRACTITIONER

## 2023-04-14 PROCEDURE — 99999 PR PBB SHADOW E&M-EST. PATIENT-LVL IV: CPT | Mod: PBBFAC,,, | Performed by: NURSE PRACTITIONER

## 2023-04-14 RX ORDER — SEMAGLUTIDE 2.68 MG/ML
2 INJECTION, SOLUTION SUBCUTANEOUS
COMMUNITY
Start: 2023-03-29

## 2023-04-14 RX ORDER — GLIMEPIRIDE 2 MG/1
2 TABLET ORAL EVERY MORNING
COMMUNITY
Start: 2023-03-29

## 2023-04-14 NOTE — PROGRESS NOTES
"Chief Complaint  Chief Complaint   Patient presents with    Urinary Tract Infection     Frequent urine and  Cloudy urine x1 day        HPI    HPI   Ms. Brisa Rivas is a 42 y.o. female with medical problems as listed belows. The patient presents to clinic with c/o urine frequency, pelvic pain and cloudy urine x 1 days. She states that she feels ok today but wants to make sure she does not have a UTI.    No risk of STD    Hx of diabetes and she is being follow by endo. Last hemoglobin A1c was 8.2 and her medication has since then been changed. She did have a recent yeast infection that she took diflucan for. She states she gets them often.    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     Diabetes mellitus type II     uncontrolled    Herpes simplex virus (HSV) infection     "cold sores"    History of  delivery, currently pregnant x2     Kidney stones     Obesity 2013       PAST SURGICAL HISTORY:  Past Surgical History:   Procedure Laterality Date    ABDOMINOPLASTY N/A 2021    Procedure: ABDOMINOPLASTY;  Surgeon: Víctor Weston MD;  Location: [facility] OR;  Service: Plastics;  Laterality: N/A;  with routine liposuction     SECTION      x3    DILATION AND CURETTAGE OF UTERUS USING SUCTION N/A 2019    Procedure: DILATION AND CURETTAGE, UTERUS, USING SUCTION;  Surgeon: Tonya Cavanaugh MD;  Location: [facility] OR;  Service: OB/GYN;  Laterality: N/A;    EXTRACORPOREAL SHOCK WAVE LITHOTRIPSY      LAPAROSCOPIC GASTRIC BANDING      LIPOSUCTION OF ABDOMEN N/A 2021    Procedure: LIPOSUCTION, ABDOMEN;  Surgeon: Víctor Weston MD;  Location: [facility] OR;  Service: Plastics;  Laterality: N/A;       SOCIAL HISTORY:  Social History     Socioeconomic History    Marital status:    Occupational History    Occupation: RN     Comment: Dialysis   Tobacco Use    Smoking status: Never    Smokeless tobacco: Never "   Substance and Sexual Activity    Alcohol use: No     Comment: social    Drug use: No    Sexual activity: Yes     Partners: Male     Birth control/protection: None   Other Topics Concern    Are you pregnant or think you may be? No    Breast-feeding No     Social Determinants of Health     Financial Resource Strain: Unknown    Difficulty of Paying Living Expenses: Patient refused   Food Insecurity: Unknown    Worried About Running Out of Food in the Last Year: Patient refused    Ran Out of Food in the Last Year: Patient refused   Transportation Needs: Unknown    Lack of Transportation (Medical): Patient refused    Lack of Transportation (Non-Medical): Patient refused   Physical Activity: Insufficiently Active    Days of Exercise per Week: 3 days    Minutes of Exercise per Session: 20 min   Stress: No Stress Concern Present    Feeling of Stress : Not at all   Social Connections: Unknown    Frequency of Communication with Friends and Family: Patient refused    Frequency of Social Gatherings with Friends and Family: Patient refused    Active Member of Clubs or Organizations: No    Attends Club or Organization Meetings: Patient refused    Marital Status:    Housing Stability: Unknown    Unable to Pay for Housing in the Last Year: Patient refused    Number of Places Lived in the Last Year: 1    Unstable Housing in the Last Year: Patient refused       FAMILY HISTORY:  Family History   Problem Relation Age of Onset    Cancer Mother     Learning disabilities Son     Heart disease Maternal Grandfather     Breast cancer Maternal Grandmother     Colon cancer Neg Hx     Ovarian cancer Neg Hx        ALLERGIES AND MEDICATIONS: updated and reviewed.  Review of patient's allergies indicates:   Allergen Reactions    Trijardy xr [empaglifloz-linaglip-metformin] Rash    Bydureon [exenatide microspheres]     Semaglutide      Current Outpatient Medications   Medication Sig Dispense Refill    ammonium lactate 12 % Crea Qhs nails  and underneath nails 140 g 0    augmented betamethasone dipropionate (DIPROLENE-AF) 0.05 % cream Apply topically 2 (two) times daily. 50 g 0    blood sugar diagnostic (ONETOUCH ULTRA BLUE TEST STRIP) Strp 1 strip by Misc.(Non-Drug; Combo Route) route 4 (four) times daily. 360 strip 3    clobetasol 0.05% (TEMOVATE) 0.05 % Oint APPLY A THIN LAYER TO THE AFFECTED AREA 2 TIMES PER DAY AS NEEDED      ergocalciferol (ERGOCALCIFEROL) 50,000 unit Cap Take 50,000 Units by mouth twice a week.      fluconazole (DIFLUCAN) 150 MG Tab TAKE 1 TABLET BY MOUTH ONCE. FOR 1 DOSE 1 tablet 0    gentamicin (GARAMYCIN) 0.1 % ointment Apply topically 2 (two) times daily. Qam and qdinner to nails and underneath naiils 30 g 1    metFORMIN (GLUCOPHAGE) 1000 MG tablet Take 1 tablet (1,000 mg total) by mouth 2 (two) times daily with meals. 180 tablet 3    glimepiride (AMARYL) 2 MG tablet Take 2 mg by mouth every morning.      OZEMPIC 2 mg/dose (8 mg/3 mL) PnIj Inject 2 mg into the skin every 7 days.       No current facility-administered medications for this visit.       Patient Care Team:  Hernando Duncan MD as PCP - General (Internal Medicine)  Neri Peña OD as Physician (Optometry)  Isabel Thornton MD as Consulting Physician (Endocrinology)  Kathia Cortes MA as Care Coordinator  Neri Peña OD (Optometry)    ROS  Review of Systems   Constitutional:  Negative for chills, fatigue, fever and unexpected weight change.   HENT:  Negative for congestion, ear discharge, ear pain and postnasal drip.    Eyes:  Negative for photophobia, pain and visual disturbance.   Respiratory:  Negative for cough, shortness of breath and wheezing.    Cardiovascular:  Negative for chest pain, palpitations and leg swelling.   Gastrointestinal:  Negative for abdominal pain, constipation, diarrhea, nausea and vomiting.   Genitourinary:  Positive for frequency and pelvic pain. Negative for dysuria, urgency and vaginal discharge.   Musculoskeletal:  Negative  "for back pain, joint swelling and neck stiffness.   Skin:  Negative for rash.   Neurological:  Negative for weakness and headaches.   Psychiatric/Behavioral:  Negative for dysphoric mood and sleep disturbance. The patient is not nervous/anxious.          Physical Exam  Vitals:    04/14/23 1526   BP: 128/76   BP Location: Left arm   Patient Position: Sitting   BP Method: Large (Manual)   Pulse: 99   Temp: 97.5 °F (36.4 °C)   TempSrc: Oral   SpO2: 99%   Weight: 92.4 kg (203 lb 11.3 oz)   Height: 5' 8" (1.727 m)    Body mass index is 30.97 kg/m².  Weight: 92.4 kg (203 lb 11.3 oz)   Height: 5' 8" (172.7 cm)   Physical Exam  Constitutional:       Appearance: She is well-developed.   HENT:      Head: Normocephalic and atraumatic.      Right Ear: External ear normal.      Left Ear: External ear normal.      Nose: Nose normal.   Eyes:      Extraocular Movements: Extraocular movements intact.   Neck:      Thyroid: No thyromegaly.   Cardiovascular:      Rate and Rhythm: Normal rate and regular rhythm.   Pulmonary:      Effort: Pulmonary effort is normal.      Breath sounds: Normal breath sounds.   Abdominal:      General: Bowel sounds are normal. There is no distension.      Palpations: There is no hepatomegaly or splenomegaly.      Tenderness: There is no abdominal tenderness.   Musculoskeletal:         General: Normal range of motion.      Cervical back: Normal range of motion and neck supple.   Lymphadenopathy:      Cervical: No cervical adenopathy.   Skin:     General: Skin is warm and dry.   Neurological:      Mental Status: She is alert and oriented to person, place, and time.   Psychiatric:         Behavior: Behavior normal.           Health Maintenance         Date Due Completion Date    Low Dose Statin Never done ---    Eye Exam 12/27/2020 12/27/2019    Override on 12/31/2015: Done (Dr. Neri Peña([facility])- negative for diabetic retinopathy bilaterally)    COVID-19 Vaccine (4 - Booster for Pfizer series) " 02/21/2022 12/27/2021    Foot Exam 11/22/2022 11/22/2021    Override on 11/16/2020: Done    Override on 9/3/2019: Done    Hemoglobin A1c 05/10/2023 11/10/2022    Mammogram 11/08/2023 11/8/2022    Diabetes Urine Screening 11/10/2023 11/10/2022    Lipid Panel 11/10/2023 11/10/2022    Cervical Cancer Screening 10/20/2027 10/20/2022    TETANUS VACCINE 09/16/2032 9/16/2022          Health maintenance reviewed at this time.    Assessment & Plan  Frequency of urination  -     Urine culture  -     POCT URINE DIPSTICK WITHOUT MICROSCOPE    Pelvic pain  -     Vaginosis Screen by DNA Probe  -     C. trachomatis/N. gonorrhoeae by AMP DNA    Poct urine did not show uti    Will get additional testing to r/o other pelvic abnormalities.     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  The current medical regimen is effective;  continue present plan and medications.           Follow-up: Follow up if symptoms worsen or fail to improve.

## 2023-04-16 LAB
BACTERIA UR CULT: NORMAL
C TRACH DNA SPEC QL NAA+PROBE: NOT DETECTED
N GONORRHOEA DNA SPEC QL NAA+PROBE: NOT DETECTED

## 2023-04-18 ENCOUNTER — PATIENT OUTREACH (OUTPATIENT)
Dept: ADMINISTRATIVE | Facility: HOSPITAL | Age: 43
End: 2023-04-18
Payer: COMMERCIAL

## 2023-04-18 LAB
BACTERIAL VAGINOSIS DNA: NEGATIVE
CANDIDA GLABRATA DNA: NEGATIVE
CANDIDA KRUSEI DNA: NEGATIVE
CANDIDA RRNA VAG QL PROBE: NEGATIVE
T VAGINALIS RRNA GENITAL QL PROBE: NEGATIVE

## 2023-04-25 DIAGNOSIS — A49.8 PSEUDOMONAS INFECTION: ICD-10-CM

## 2023-04-26 RX ORDER — GENTAMICIN SULFATE 1 MG/G
OINTMENT TOPICAL
Qty: 30 G | Refills: 1 | OUTPATIENT
Start: 2023-04-26

## 2023-05-02 ENCOUNTER — PATIENT MESSAGE (OUTPATIENT)
Dept: ADMINISTRATIVE | Facility: HOSPITAL | Age: 43
End: 2023-05-02
Payer: COMMERCIAL

## 2023-05-03 ENCOUNTER — PATIENT MESSAGE (OUTPATIENT)
Dept: BARIATRICS | Facility: CLINIC | Age: 43
End: 2023-05-03
Payer: COMMERCIAL

## 2023-05-09 ENCOUNTER — PATIENT MESSAGE (OUTPATIENT)
Dept: BARIATRICS | Facility: CLINIC | Age: 43
End: 2023-05-09
Payer: COMMERCIAL

## 2023-05-12 ENCOUNTER — PATIENT MESSAGE (OUTPATIENT)
Dept: ADMINISTRATIVE | Facility: HOSPITAL | Age: 43
End: 2023-05-12
Payer: COMMERCIAL

## 2023-05-16 ENCOUNTER — PATIENT MESSAGE (OUTPATIENT)
Dept: ADMINISTRATIVE | Facility: HOSPITAL | Age: 43
End: 2023-05-16
Payer: COMMERCIAL

## 2023-05-18 ENCOUNTER — PATIENT MESSAGE (OUTPATIENT)
Dept: ADMINISTRATIVE | Facility: HOSPITAL | Age: 43
End: 2023-05-18
Payer: COMMERCIAL

## 2023-05-18 ENCOUNTER — TELEPHONE (OUTPATIENT)
Dept: DERMATOLOGY | Facility: CLINIC | Age: 43
End: 2023-05-18
Payer: COMMERCIAL

## 2023-05-18 NOTE — TELEPHONE ENCOUNTER
----- Message from Audrey Lay sent at 5/18/2023  1:10 PM CDT -----  Regarding: Same Day Appt Access  Contact: [phone]  Pt has appt sched 08/28/23 but is requesting to be seen sooner.  Complaints of itching (upper body area) and is very uncomfortable.  Please call.

## 2023-05-23 ENCOUNTER — OFFICE VISIT (OUTPATIENT)
Dept: DERMATOLOGY | Facility: CLINIC | Age: 43
End: 2023-05-23
Payer: COMMERCIAL

## 2023-05-23 DIAGNOSIS — L30.9 DERMATITIS: ICD-10-CM

## 2023-05-23 DIAGNOSIS — L60.3 ONYCHODYSTROPHY: Primary | ICD-10-CM

## 2023-05-23 PROCEDURE — 99214 OFFICE O/P EST MOD 30 MIN: CPT | Mod: S$GLB,,, | Performed by: DERMATOLOGY

## 2023-05-23 PROCEDURE — 99214 PR OFFICE/OUTPT VISIT, EST, LEVL IV, 30-39 MIN: ICD-10-PCS | Mod: S$GLB,,, | Performed by: DERMATOLOGY

## 2023-05-23 RX ORDER — CLOBETASOL PROPIONATE 0.46 MG/ML
SOLUTION TOPICAL
Qty: 60 ML | Refills: 1 | Status: SHIPPED | OUTPATIENT
Start: 2023-05-23

## 2023-05-23 RX ORDER — TRIAMCINOLONE ACETONIDE 1 MG/G
CREAM TOPICAL
Qty: 80 G | Refills: 0 | Status: SHIPPED | OUTPATIENT
Start: 2023-05-23

## 2023-05-23 NOTE — PROGRESS NOTES
Patient Information  Name: Tonya Rivas  : 1980  MRN: [id number]     Referring Physician:  No ref. provider found   Primary Care Physician:  Hernando Duncan MD   Date of Visit: 2023      Subjective:     History of Present lllness:    Tonya Rivas is a 42 y.o. female who presents with a chief complaint of nail problems and rash.    Diagnosis: onchodystrophy  Location: finger nails  Signs/Symptoms: discoloration, lifting, soft and peeling  Symptom course: worsening  Previous treatment:  amlactin and clobetasol, also previously used gentamicin ointment ()  Nails had returned to normal by about  (after cutting nails back and using clobetasol + amlactin for 2 months), nails were normal for ~4 mo, then flared again x 2 months; she had been going to the nail salon while nails were normal and did not notice any flares due to gel polish (no acrylics)    Location: chest and l upper arm  Duration: 2-3 months  Signs/Symptoms: rash with itching and sometimes raised  Relieving factors/Prior treatments: none    Patient was last seen: 2019.  Prior notes by myself reviewed.   Clinical documentation obtained by nursing staff reviewed.    Review of Systems   Constitutional:  Negative for fever, chills, fatigue and malaise.   Respiratory:  Negative for cough.    Skin:  Positive for itching and rash.   Hematologic/Lymphatic: Negative for adenopathy.   Allergic/Immunologic: Positive for environmental allergies.     Objective:   Physical Exam   Constitutional: She appears well-developed and well-nourished. No distress.   Neurological: She is alert and oriented to person, place, and time. She is not disoriented.   Psychiatric: She has a normal mood and affect.   Skin:   Areas Examined (abnormalities noted in diagram):   Scalp / Hair Palpated and Inspected  Head / Face Inspection Performed  Neck Inspection Performed  Chest / Axilla Inspection Performed  Back Inspection Performed  RUE Inspected  LUE  Inspection Performed  Nails and Digits Inspection Performed              Diagram Legend     Erythematous scaling macule/papule c/w actinic keratosis       Vascular papule c/w angioma      Pigmented verrucoid papule/plaque c/w seborrheic keratosis      Yellow umbilicated papule c/w sebaceous hyperplasia      Irregularly shaped tan macule c/w lentigo     1-2 mm smooth white papules consistent with Milia      Movable subcutaneous cyst with punctum c/w epidermal inclusion cyst      Subcutaneous movable cyst c/w pilar cyst      Firm pink to brown papule c/w dermatofibroma      Pedunculated fleshy papule(s) c/w skin tag(s)      Evenly pigmented macule c/w junctional nevus     Mildly variegated pigmented, slightly irregular-bordered macule c/w mildly atypical nevus      Flesh colored to evenly pigmented papule c/w intradermal nevus       Pink pearly papule/plaque c/w basal cell carcinoma      Erythematous hyperkeratotic cursted plaque c/w SCC      Surgical scar with no sign of skin cancer recurrence      Open and closed comedones      Inflammatory papules and pustules      Verrucoid papule consistent consistent with wart     Erythematous eczematous patches and plaques     Dystrophic onycholytic nail with subungual debris c/w onychomycosis     Umbilicated papule    Erythematous-base heme-crusted tan verrucoid plaque consistent with inflamed seborrheic keratosis     Erythematous Silvery Scaling Plaque c/w Psoriasis     See annotation    No images are attached to the encounter or orders placed in the encounter.      [] Data reviewed  [] Prior external notes reviewed  [] Independent review of test  [] Management discussed with another provider  [] Independent historian    Assessment / Plan:        Onychodystrophy  - chronic problem with exacerbation/progression  Differential diagnosis includes nail psoriasis vs allergic contact dermatitis.  Previously improved with clobetasol so will restart. Recommend avoiding all nail polish  in case of an allergic component.  -     clobetasoL (TEMOVATE) 0.05 % external solution; Use on affected nails once daily as needed for scaling.  Dispense: 60 mL; Refill: 1  Side effects from the overuse of topical steroids include thinning of skin, easy tearing/bruising of skin, stretch marks, spider veins, etc. Use the topical steroid no more than 2 days per week if used long-term and/or take breaks from the topical steroid, especially if any of the above side effects are noticed.    Dermatitis  Differential diagnosis includes psoriasis vs allergic contact dermatitis.  -     triamcinolone acetonide 0.1% (KENALOG) 0.1 % cream; Apply to affected areas of body BID prn rash. Do not use on face, underarms, or groin.  Dispense: 80 g; Refill: 0  Side effects from the overuse of topical steroids include thinning of skin, easy tearing/bruising of skin, stretch marks, spider veins, etc. Use the topical steroid no more than 2 days per week if used long-term and/or take breaks from the topical steroid, especially if any of the above side effects are noticed.      Follow up in about 3 months (around 8/23/2023) for follow up, or sooner if symptoms worsening or not improving.      Obdulia Her MD, NICD  [facility]

## 2023-05-29 ENCOUNTER — PATIENT MESSAGE (OUTPATIENT)
Dept: ADMINISTRATIVE | Facility: HOSPITAL | Age: 43
End: 2023-05-29
Payer: COMMERCIAL

## 2023-06-01 ENCOUNTER — PATIENT MESSAGE (OUTPATIENT)
Dept: FAMILY MEDICINE | Facility: CLINIC | Age: 43
End: 2023-06-01
Payer: COMMERCIAL

## 2023-06-07 ENCOUNTER — PATIENT MESSAGE (OUTPATIENT)
Dept: BARIATRICS | Facility: CLINIC | Age: 43
End: 2023-06-07
Payer: COMMERCIAL

## 2023-06-13 ENCOUNTER — PATIENT MESSAGE (OUTPATIENT)
Dept: ADMINISTRATIVE | Facility: HOSPITAL | Age: 43
End: 2023-06-13
Payer: COMMERCIAL

## 2023-06-13 ENCOUNTER — PATIENT MESSAGE (OUTPATIENT)
Dept: BARIATRICS | Facility: CLINIC | Age: 43
End: 2023-06-13
Payer: COMMERCIAL

## 2023-06-30 NOTE — PROGRESS NOTES
This note was created by combination of typed  and M-Modal dictation.  Transcription errors may be present.  If there are any questions, please contact me.    Assessment and Plan:   Assessment and Plan    Atypical chest pain  -atypical chest pain, risk factors of female sex, DM. Will refer to cardiology for eval. Check CXR. If negative, monitor. Infrequent occurrence.  -     X-Ray Chest PA And Lateral; Future; Expected date: 07/05/2023  -     EKG 12-lead  -     Ambulatory referral/consult to Cardiology; Future; Expected date: 07/12/2023    Snoring  Obstructive sleep apnea  -snoring, AM headache, hx of dx of VERA. Willing to rechallenge. Ordered HST if negative, would check in lab study. 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    There are no discontinued medications.    meds sent this encounter:         Follow Up:   No future appointments.        Subjective:   Subjective   Chief Complaint   Patient presents with    Chest Pain       HPI  Tonya is a 42 y.o. female.    Social History     Tobacco Use    Smoking status: Never    Smokeless tobacco: Never   Substance Use Topics    Alcohol use: No     Comment: social      Social History     Occupational History    Occupation: RN     Comment: Dialysis      Social History     Social History Narrative    Not on file       No LMP recorded.    Last appointment with this clinic was Visit date not found. Last visit with me 9/16/2022   To summarize last visit and events leading up to today:  DM2 outside endo [facility]. SGLT2 with SE  Lipid therapeutic lifestyle modification (TLC)  Iron deficiency  Hx lap band    Message from pt  Would you recommend a CT Heart w/o dye w/ca scoring to evaluate me. I have  had intermittent chest discomfort with out activity, but nothing now.   I am aware that due to my DM2 I am at high risk and should be evaluated, could you please advise. I would also like to be evaluated for VERA.   Would you like me to make an appointment to discuss my concerns before or after test if recommended.     Today's visit:  Please note: Chronic medical problems that are stable but are being addressed at this visit may not be listed/documented here, but may be addressed in more detail in the Assessment and Plan section.   Below are salient features of chronic medical conditions, or new/acute conditions and their details.    Two episodes of chest pain, each episode lasting for few minutes.  Each occurred when sitting doing nothing.  Felt a tightness sensation in her chest.  No accompanying palpitations, lightheadedness, presyncope.  The 1st episode was maybe a month ago and the 2nd episode about 2 weeks ago.  She generally walks for exercise, by herself for with her .  Has not occur with physical activity.  Was suggested that this might be reflux, has had reflux during her pregnancy and this did not feel like it.  Her previous reflux felt like stomach contents coming up, indigestion, this felt different more like a tightness  No respiratory infection type symptoms.    Remote history of sleep apnea diagnosed around 2005 she thinks.  Thinks that she may have had a CPAP machine but could not tolerate at the time, woke up gripping the mask off her face.  But she is been told that she is snoring and waking up in the mornings with headaches and would like to re-evaluate for sleep apnea.  She would be willing to rechallenge on treatment if indicated    She has fullness in her submandibular area, has not really changed but she is aware given her family history of lymphoma in her mother.  Her  and her daughter sometimes tell her that the area underneath her neck may change color and turn blue.  But it  does not seem to be a permanent thing.  Does not really feel different compared to previous.    Previous imaging with CT showing nothing concerning and saw ENT, normal evaluation recommendation was to follow-up p.r.n.    Answers submitted by the patient for this visit:  Review of Systems Questionnaire (Submitted on 2023)  activity change: Yes  hearing loss: No  trouble swallowing: No  eye discharge: No  chest tightness: Yes  wheezing: No  chest pain: No  palpitations: Yes  constipation: No  vomiting: No  diarrhea: No  difficulty urinating: No  hematuria: No  headaches: Yes  dysphoric mood: No      Patient Care Team:  Hernando Ducnan MD as PCP - General (Internal Medicine)  Neri Peña OD as Physician (Optometry)  Isabel Thornton MD as Consulting Physician (Endocrinology)  Kathia Cortes MA as Care Coordinator  Neri Peña OD (Optometry)      Patient Active Problem List    Diagnosis Date Noted    Status post gastric banding 2021    Iron deficiency/low ferritin; normal CBC 06/15/2021    Decreased ROM of right foot 12/15/2020    Decreased strength 12/15/2020    Postural imbalance 12/15/2020    Diabetic retinopathy of left eye associated with type 2 diabetes mellitus 2019    Obstructive sleep apnea 2019    Missed  2019    S/P suction dilation and curettage 2019    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  Medication List with Changes/Refills   Current Medications    AMMONIUM LACTATE 12 % CREA    Qhs nails and underneath nails    AUGMENTED BETAMETHASONE DIPROPIONATE (DIPROLENE-AF) 0.05 % CREAM    Apply  "topically 2 (two) times daily.    BLOOD SUGAR DIAGNOSTIC (ONETOUCH ULTRA BLUE TEST STRIP) STRP    1 strip by Misc.(Non-Drug; Combo Route) route 4 (four) times daily.    CLOBETASOL (TEMOVATE) 0.05 % EXTERNAL SOLUTION    Use on affected nails once daily as needed for scaling.    ERGOCALCIFEROL (ERGOCALCIFEROL) 50,000 UNIT CAP    Take 50,000 Units by mouth twice a week.    FLUCONAZOLE (DIFLUCAN) 150 MG TAB    TAKE 1 TABLET BY MOUTH ONCE. FOR 1 DOSE    GENTAMICIN (GARAMYCIN) 0.1 % OINTMENT    Apply topically 2 (two) times daily. Qam and qdinner to nails and underneath naiils    GLIMEPIRIDE (AMARYL) 2 MG TABLET    Take 2 mg by mouth every morning.    METFORMIN (GLUCOPHAGE) 1000 MG TABLET    Take 1 tablet (1,000 mg total) by mouth 2 (two) times daily with meals.    OZEMPIC 2 MG/DOSE (8 MG/3 ML) PNIJ    Inject 2 mg into the skin every 7 days.    TRIAMCINOLONE ACETONIDE 0.1% (KENALOG) 0.1 % CREAM    Apply to affected areas of body BID prn rash. Do not use on face, underarms, or groin.         Objective:   Objective   Physical Exam   Vitals:    07/05/23 1625   BP: 136/88   Pulse: 78   Temp: 98.2 °F (36.8 °C)   TempSrc: Oral   SpO2: 97%   Weight: 91.8 kg (202 lb 7.9 oz)   Height: 5' 8" (1.727 m)    Body mass index is 30.79 kg/m².  Weight: 91.8 kg (202 lb 7.9 oz)   Height: 5' 8" (172.7 cm)     Physical Exam  Constitutional:       General: She is not in acute distress.     Appearance: She is well-developed.   Eyes:      Extraocular Movements: Extraocular movements intact.   Neck:      Comments: Submandibular area with somewhat prominent submandibular salivary glands  without masses or asymmetry  Cardiovascular:      Rate and Rhythm: Normal rate and regular rhythm.      Pulses:           Dorsalis pedis pulses are 3+ on the right side and 3+ on the left side.        Posterior tibial pulses are 3+ on the right side and 3+ on the left side.      Heart sounds: Normal heart sounds. No murmur heard.  Pulmonary:      Effort: Pulmonary " effort is normal.      Breath sounds: Normal breath sounds.   Musculoskeletal:         General: Normal range of motion.      Right foot: No deformity.      Left foot: No deformity.   Feet:      Right foot:      Protective Sensation: 5 sites tested.  5 sites sensed.      Skin integrity: No ulcer, blister, skin breakdown, erythema or warmth.      Left foot:      Protective Sensation: 5 sites tested.  5 sites sensed.      Skin integrity: No ulcer, blister, skin breakdown, erythema or warmth.   Lymphadenopathy:      Cervical: No cervical adenopathy.   Skin:     General: Skin is warm and dry.   Neurological:      Mental Status: She is alert and oriented to person, place, and time.      Coordination: Coordination normal.   Psychiatric:         Behavior: Behavior normal.         Thought Content: Thought content normal.     EKG NSR

## 2023-07-05 ENCOUNTER — OFFICE VISIT (OUTPATIENT)
Dept: FAMILY MEDICINE | Facility: CLINIC | Age: 43
End: 2023-07-05
Payer: COMMERCIAL

## 2023-07-05 VITALS
BODY MASS INDEX: 30.69 KG/M2 | WEIGHT: 202.5 LBS | HEART RATE: 78 BPM | SYSTOLIC BLOOD PRESSURE: 136 MMHG | OXYGEN SATURATION: 97 % | HEIGHT: 68 IN | TEMPERATURE: 98 F | DIASTOLIC BLOOD PRESSURE: 88 MMHG

## 2023-07-05 DIAGNOSIS — R07.89 ATYPICAL CHEST PAIN: Primary | ICD-10-CM

## 2023-07-05 DIAGNOSIS — G47.33 OBSTRUCTIVE SLEEP APNEA: ICD-10-CM

## 2023-07-05 DIAGNOSIS — R06.83 SNORING: ICD-10-CM

## 2023-07-05 DIAGNOSIS — E11.319 TYPE 2 DIABETES MELLITUS WITH LEFT EYE AFFECTED BY RETINOPATHY WITHOUT MACULAR EDEMA, WITHOUT LONG-TERM CURRENT USE OF INSULIN, UNSPECIFIED RETINOPATHY SEVERITY: ICD-10-CM

## 2023-07-05 PROCEDURE — 93010 ELECTROCARDIOGRAM REPORT: CPT | Mod: S$GLB,,, | Performed by: INTERNAL MEDICINE

## 2023-07-05 PROCEDURE — 99214 OFFICE O/P EST MOD 30 MIN: CPT | Mod: S$GLB,,, | Performed by: INTERNAL MEDICINE

## 2023-07-05 PROCEDURE — 99999 PR PBB SHADOW E&M-EST. PATIENT-LVL V: CPT | Mod: PBBFAC,,, | Performed by: INTERNAL MEDICINE

## 2023-07-05 PROCEDURE — 93005 ELECTROCARDIOGRAM TRACING: CPT | Mod: S$GLB,,, | Performed by: INTERNAL MEDICINE

## 2023-07-05 PROCEDURE — 99214 PR OFFICE/OUTPT VISIT, EST, LEVL IV, 30-39 MIN: ICD-10-PCS | Mod: S$GLB,,, | Performed by: INTERNAL MEDICINE

## 2023-07-05 PROCEDURE — 99999 PR PBB SHADOW E&M-EST. PATIENT-LVL V: ICD-10-PCS | Mod: PBBFAC,,, | Performed by: INTERNAL MEDICINE

## 2023-07-05 PROCEDURE — 93010 EKG 12-LEAD: ICD-10-PCS | Mod: S$GLB,,, | Performed by: INTERNAL MEDICINE

## 2023-07-05 PROCEDURE — 93005 EKG 12-LEAD: ICD-10-PCS | Mod: S$GLB,,, | Performed by: INTERNAL MEDICINE

## 2023-07-05 NOTE — PATIENT INSTRUCTIONS
Please call the sleep laboratory at [phone] to discuss arranging your sleep study.     If you have not been contacted in a week about your referral, please call my Referrals Coordinator at [phone]

## 2023-07-17 ENCOUNTER — TELEPHONE (OUTPATIENT)
Dept: PULMONOLOGY | Facility: CLINIC | Age: 43
End: 2023-07-17
Payer: COMMERCIAL

## 2023-07-18 ENCOUNTER — TELEPHONE (OUTPATIENT)
Dept: SLEEP MEDICINE | Facility: HOSPITAL | Age: 43
End: 2023-07-18
Payer: COMMERCIAL

## 2023-08-02 ENCOUNTER — PATIENT MESSAGE (OUTPATIENT)
Dept: BARIATRICS | Facility: CLINIC | Age: 43
End: 2023-08-02
Payer: COMMERCIAL

## 2023-08-14 DIAGNOSIS — B37.31 VAGINAL CANDIDIASIS: ICD-10-CM

## 2023-08-14 RX ORDER — FLUCONAZOLE 150 MG/1
150 TABLET ORAL ONCE
Qty: 1 TABLET | Refills: 0 | Status: SHIPPED | OUTPATIENT
Start: 2023-08-14 | End: 2023-08-14

## 2023-08-14 NOTE — TELEPHONE ENCOUNTER
Please see the attached refill request. Patient informed me that she get yeast infection frequently due to her diabetes and is requesting a refill on medication.

## 2023-08-15 ENCOUNTER — PATIENT MESSAGE (OUTPATIENT)
Dept: ADMINISTRATIVE | Facility: HOSPITAL | Age: 43
End: 2023-08-15
Payer: COMMERCIAL

## 2023-08-28 ENCOUNTER — PATIENT MESSAGE (OUTPATIENT)
Dept: BARIATRICS | Facility: CLINIC | Age: 43
End: 2023-08-28
Payer: COMMERCIAL

## 2023-08-31 ENCOUNTER — OFFICE VISIT (OUTPATIENT)
Dept: FAMILY MEDICINE | Facility: CLINIC | Age: 43
End: 2023-08-31
Payer: COMMERCIAL

## 2023-08-31 VITALS
DIASTOLIC BLOOD PRESSURE: 82 MMHG | HEIGHT: 68 IN | WEIGHT: 201.19 LBS | HEART RATE: 93 BPM | OXYGEN SATURATION: 99 % | BODY MASS INDEX: 30.49 KG/M2 | TEMPERATURE: 99 F | SYSTOLIC BLOOD PRESSURE: 118 MMHG

## 2023-08-31 DIAGNOSIS — M25.511 ACUTE PAIN OF RIGHT SHOULDER: Primary | ICD-10-CM

## 2023-08-31 DIAGNOSIS — Z12.12 SCREENING FOR COLORECTAL CANCER: ICD-10-CM

## 2023-08-31 DIAGNOSIS — Z12.11 SCREENING FOR COLORECTAL CANCER: ICD-10-CM

## 2023-08-31 PROCEDURE — 99214 PR OFFICE/OUTPT VISIT, EST, LEVL IV, 30-39 MIN: ICD-10-PCS | Mod: S$GLB,,, | Performed by: INTERNAL MEDICINE

## 2023-08-31 PROCEDURE — 99999 PR PBB SHADOW E&M-EST. PATIENT-LVL V: ICD-10-PCS | Mod: PBBFAC,,, | Performed by: INTERNAL MEDICINE

## 2023-08-31 PROCEDURE — 99999 PR PBB SHADOW E&M-EST. PATIENT-LVL V: CPT | Mod: PBBFAC,,, | Performed by: INTERNAL MEDICINE

## 2023-08-31 PROCEDURE — 99214 OFFICE O/P EST MOD 30 MIN: CPT | Mod: S$GLB,,, | Performed by: INTERNAL MEDICINE

## 2023-08-31 RX ORDER — DICLOFENAC SODIUM 10 MG/G
GEL TOPICAL
Qty: 100 G | Refills: 1 | Status: SHIPPED | OUTPATIENT
Start: 2023-08-31 | End: 2023-11-21

## 2023-08-31 NOTE — PROGRESS NOTES
This note was created by combination of typed  and M-Modal dictation.  Transcription errors may be present.  If there are any questions, please contact me.    Assessment and Plan:   Assessment and Plan    Acute pain of right shoulder  -referral to ortho for eval, and consideration of injection  Trial of topical voltaren  She wants to limit systemic meds if possible  Home PT handout  -     Ambulatory referral/consult to Orthopedics; Future; Expected date: 09/07/2023  -     diclofenac sodium (VOLTAREN) 1 % Gel; Apply the gel (2 g) to the affected area 4 times daily. Do not apply more than 8 g daily to any one affected joint  Dispense: 100 g; Refill: 1    Screening for colorectal cancer  [employer]Kit was given to patient on 8/31/2023 3:22 PM   -     Fecal Immunochemical Test (iFOBT); Future; Expected date: 09/01/2023             Medications Discontinued During This Encounter   Medication Reason    ergocalciferol (ERGOCALCIFEROL) 50,000 unit Cap        meds sent this encounter:  Medications Ordered This Encounter   Medications    diclofenac sodium (VOLTAREN) 1 % Gel     Sig: Apply the gel (2 g) to the affected area 4 times daily. Do not apply more than 8 g daily to any one affected joint     Dispense:  100 g     Refill:  1         Follow Up:   Future Appointments   Date Time Provider Department Center   9/6/2023  2:40 PM Lenard French MD [facility]         Subjective:   Subjective   Chief Complaint   Patient presents with    Arm Pain       HPI  Tonya is a 43 y.o. female.    Social History     Tobacco Use    Smoking status: Never    Smokeless tobacco: Never   Substance Use Topics    Alcohol use: No     Comment: social      Social History     Occupational History    Occupation: RN     Comment: Dialysis      Social History     Social History Narrative    Not on file       No LMP recorded.    Last appointment with this clinic was 7/5/2023. Last visit with me 7/5/2023   To summarize last visit and  events leading up to today:  DM2 outside endo [facility]. SGLT2 with SE  Lipid therapeutic lifestyle modification (TLC)  Iron deficiency  Hx lap band  Last visit atypical chest pain, referred to Cardiology.  VERA, not using APAP recommended to rechallenge so I reordered HST    Today's visit:  Please note: Chronic medical problems that are stable but are being addressed at this visit may not be listed/documented here, but may be addressed in more detail in the Assessment and Plan section.   Below are salient features of chronic medical conditions, or new/acute conditions and their details.    Felt a pop  And pain in the right anterior shoulder  Can't lie down on it  Wakes her up  Can't brush her hair  Or lift things  Bicep muscle seems to be intact  Soreness at rest    No previous instance of this  Regularly exercises, weight lifting etc.    No nsaids.    Overall not improving    Off of rx vitamin D - excess; managed by endo.    Eye Dr. Peña [facility] one upcoming.        Answers submitted by the patient for this visit:  Review of Systems Questionnaire (Submitted on 8/31/2023)  activity change: Yes  hearing loss: No  trouble swallowing: No  eye discharge: No  chest tightness: No  wheezing: No  chest pain: No  palpitations: No  constipation: No  vomiting: No  diarrhea: No  difficulty urinating: No  hematuria: No  arthralgias: Yes  headaches: No  dysphoric mood: No    Patient Care Team:  Hernando Duncan MD as PCP - General (Internal Medicine)  Neri Peña OD as Physician (Optometry)  Isabel Thornton MD as Consulting Physician (Endocrinology)  Kathia Cortes MA as Care Coordinator  Neri Peña OD (Optometry)      Patient Active Problem List    Diagnosis Date Noted    Status post gastric banding 11/22/2021    Iron deficiency/low ferritin; normal CBC 06/15/2021    Decreased ROM of right foot 12/15/2020    Decreased strength 12/15/2020    Postural imbalance 12/15/2020    Diabetic retinopathy of left eye  "associated with type 2 diabetes mellitus 2019    Obstructive sleep apnea 2019    Missed  2019    S/P suction dilation and curettage 2019    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  Medication List with Changes/Refills   Current Medications    AMMONIUM LACTATE 12 % CREA    Qhs nails and underneath nails    AUGMENTED BETAMETHASONE DIPROPIONATE (DIPROLENE-AF) 0.05 % CREAM    Apply topically 2 (two) times daily.    BLOOD SUGAR DIAGNOSTIC (ONETOUCH ULTRA BLUE TEST STRIP) STRP    1 strip by Misc.(Non-Drug; Combo Route) route 4 (four) times daily.    CLOBETASOL (TEMOVATE) 0.05 % EXTERNAL SOLUTION    Use on affected nails once daily as needed for scaling.    ERGOCALCIFEROL (ERGOCALCIFEROL) 50,000 UNIT CAP    Take 50,000 Units by mouth twice a week.    GENTAMICIN (GARAMYCIN) 0.1 % OINTMENT    Apply topically 2 (two) times daily. Qam and qdinner to nails and underneath naiils    GLIMEPIRIDE (AMARYL) 2 MG TABLET    Take 2 mg by mouth every morning.    METFORMIN (GLUCOPHAGE) 1000 MG TABLET    Take 1 tablet (1,000 mg total) by mouth 2 (two) times daily with meals.    OZEMPIC 2 MG/DOSE (8 MG/3 ML) PNIJ    Inject 2 mg into the skin every 7 days.    TRIAMCINOLONE ACETONIDE 0.1% (KENALOG) 0.1 % CREAM    Apply to affected areas of body BID prn rash. Do not use on face, underarms, or groin.         Objective:   Objective   Physical Exam   Vitals:    23 1503   BP: 118/82   Pulse: 93   Temp: 98.9 °F (37.2 °C)   TempSrc: Oral   SpO2: 99%   Weight: 91.3 kg (201 lb 2.7 oz)   Height: 5' 8" (1.727 m)    Body mass index is 30.59 kg/m².  Weight: " "91.3 kg (201 lb 2.7 oz)   Height: 5' 8" (172.7 cm)     Physical Exam  Constitutional:       General: She is not in acute distress.     Appearance: She is well-developed.   Eyes:      Extraocular Movements: Extraocular movements intact.   Cardiovascular:      Rate and Rhythm: Normal rate and regular rhythm.      Heart sounds: Normal heart sounds. No murmur heard.  Pulmonary:      Effort: Pulmonary effort is normal.      Breath sounds: Normal breath sounds.   Musculoskeletal:         General: Normal range of motion.      Comments: Right AC joint nontender  Is tender in the biciptal groove area  Biceps muscle no deformity, strenght 5/5  Pain with inversion and eversion  Liftoff test normal/negative  Able to abduct over her head, drop arm negative.   Skin:     General: Skin is warm and dry.   Neurological:      Mental Status: She is alert and oriented to person, place, and time.      Coordination: Coordination normal.   Psychiatric:         Behavior: Behavior normal.         Thought Content: Thought content normal.                  "

## 2023-09-01 DIAGNOSIS — M25.511 RIGHT SHOULDER PAIN, UNSPECIFIED CHRONICITY: Primary | ICD-10-CM

## 2023-09-06 ENCOUNTER — OFFICE VISIT (OUTPATIENT)
Dept: CARDIOLOGY | Facility: CLINIC | Age: 43
End: 2023-09-06
Payer: COMMERCIAL

## 2023-09-06 ENCOUNTER — PATIENT MESSAGE (OUTPATIENT)
Dept: BARIATRICS | Facility: CLINIC | Age: 43
End: 2023-09-06
Payer: COMMERCIAL

## 2023-09-06 VITALS
OXYGEN SATURATION: 100 % | HEART RATE: 94 BPM | DIASTOLIC BLOOD PRESSURE: 74 MMHG | RESPIRATION RATE: 18 BRPM | HEIGHT: 68 IN | SYSTOLIC BLOOD PRESSURE: 126 MMHG | WEIGHT: 199.19 LBS | BODY MASS INDEX: 30.19 KG/M2

## 2023-09-06 DIAGNOSIS — E78.5 DYSLIPIDEMIA: ICD-10-CM

## 2023-09-06 DIAGNOSIS — R07.9 CHEST PAIN, UNSPECIFIED TYPE: Primary | ICD-10-CM

## 2023-09-06 DIAGNOSIS — E66.9 NON MORBID OBESITY, UNSPECIFIED OBESITY TYPE: ICD-10-CM

## 2023-09-06 DIAGNOSIS — R94.31 ABNORMAL ECG DURING EXERCISE STRESS TEST: ICD-10-CM

## 2023-09-06 DIAGNOSIS — E11.319 TYPE 2 DIABETES MELLITUS WITH LEFT EYE AFFECTED BY RETINOPATHY WITHOUT MACULAR EDEMA, WITHOUT LONG-TERM CURRENT USE OF INSULIN, UNSPECIFIED RETINOPATHY SEVERITY: ICD-10-CM

## 2023-09-06 DIAGNOSIS — R07.89 ATYPICAL CHEST PAIN: ICD-10-CM

## 2023-09-06 PROCEDURE — 99999 PR PBB SHADOW E&M-EST. PATIENT-LVL V: CPT | Mod: PBBFAC,,, | Performed by: INTERNAL MEDICINE

## 2023-09-06 PROCEDURE — 99999 PR PBB SHADOW E&M-EST. PATIENT-LVL V: ICD-10-PCS | Mod: PBBFAC,,, | Performed by: INTERNAL MEDICINE

## 2023-09-06 PROCEDURE — 99204 OFFICE O/P NEW MOD 45 MIN: CPT | Mod: S$GLB,,, | Performed by: INTERNAL MEDICINE

## 2023-09-06 PROCEDURE — 99204 PR OFFICE/OUTPT VISIT, NEW, LEVL IV, 45-59 MIN: ICD-10-PCS | Mod: S$GLB,,, | Performed by: INTERNAL MEDICINE

## 2023-09-06 RX ORDER — ATORVASTATIN CALCIUM 20 MG/1
20 TABLET, FILM COATED ORAL NIGHTLY
Qty: 90 TABLET | Refills: 3 | Status: SHIPPED | OUTPATIENT
Start: 2023-09-06

## 2023-09-06 NOTE — PROGRESS NOTES
"CARDIOVASCULAR CONSULTATION    REASON FOR CONSULT:   Tonya Rivas is a 43 y.o. female who presents for CP.    Req/PCP: Dair  HISTORY OF PRESENT ILLNESS:   The patient is a very pleasant 43-year-old woman referred by her primary care physician for evaluation of chest pain.  She reports several episodes of substernal chest discomfort for which he makes a Culp sign and describes this as tightness.  This occurred several months ago.  There was no associated shortness of breath or diaphoresis.  She denies any exertional symptoms or dyspnea.  There has been no palpitations or syncope.  She denies PND, orthopnea, melena, hematuria, or claudication symptoms.      Family history is somewhat unknown to the patient as her mother  in her 40s of non-Hodgkin's lymphoma and her father is not known to the patient.      The patient is a nonsmoker.  She denies alcohol excess or illicit drug use.  She works as a nurse in dialysis.    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     Diabetes mellitus type II     uncontrolled    Herpes simplex virus (HSV) infection     "cold sores"    History of  delivery, currently pregnant x2     Kidney stones     Obesity 2013       PAST SURGICAL HISTORY:     Past Surgical History:   Procedure Laterality Date    ABDOMINOPLASTY N/A 2021    Procedure: ABDOMINOPLASTY;  Surgeon: Víctor Weston MD;  Location: [facility] OR;  Service: Plastics;  Laterality: N/A;  with routine liposuction     SECTION      x3    DILATION AND CURETTAGE OF UTERUS USING SUCTION N/A 2019    Procedure: DILATION AND CURETTAGE, UTERUS, USING SUCTION;  Surgeon: Tonya Cavanaugh MD;  Location: [facility] OR;  Service: OB/GYN;  Laterality: N/A;    EXTRACORPOREAL SHOCK WAVE LITHOTRIPSY      LAPAROSCOPIC GASTRIC BANDING  2005    LIPOSUCTION OF ABDOMEN N/A 2021    Procedure: LIPOSUCTION, " ABDOMEN;  Surgeon: Víctor Weston MD;  Location: [facility] OR;  Service: Plastics;  Laterality: N/A;       ALLERGIES AND MEDICATION:     Review of patient's allergies indicates:   Allergen Reactions    Trijardy xr [empaglifloz-linaglip-metformin] Rash    Bydureon [exenatide microspheres]     Semaglutide Other (See Comments)        Medication List            Accurate as of September 6, 2023  3:32 PM. If you have any questions, ask your nurse or doctor.                CONTINUE taking these medications      ammonium lactate 12 % Crea  Qhs nails and underneath nails     augmented betamethasone dipropionate 0.05 % cream  Commonly known as: DIPROLENE-AF  Apply topically 2 (two) times daily.     blood sugar diagnostic Strp  Commonly known as: ONETOUCH ULTRA BLUE TEST STRIP  1 strip by Misc.(Non-Drug; Combo Route) route 4 (four) times daily.     clobetasoL 0.05 % external solution  Commonly known as: TEMOVATE  Use on affected nails once daily as needed for scaling.     diclofenac sodium 1 % Gel  Commonly known as: VOLTAREN  Apply the gel (2 g) to the affected area 4 times daily. Do not apply more than 8 g daily to any one affected joint     glimepiride 2 MG tablet  Commonly known as: AMARYL     metFORMIN 1000 MG tablet  Commonly known as: GLUCOPHAGE  Take 1 tablet (1,000 mg total) by mouth 2 (two) times daily with meals.     OZEMPIC 2 mg/dose (8 mg/3 mL) Pnij  Generic drug: semaglutide     triamcinolone acetonide 0.1% 0.1 % cream  Commonly known as: KENALOG  Apply to affected areas of body BID prn rash. Do not use on face, underarms, or groin.            STOP taking these medications      gentamicin 0.1 % ointment  Commonly known as: GARAMYCIN  Stopped by: Lenard French MD              SOCIAL HISTORY:     Social History     Socioeconomic History    Marital status:    Occupational History    Occupation: RN     Comment: Dialysis   Tobacco Use    Smoking status: Never    Smokeless tobacco: Never   Substance and  Sexual Activity    Alcohol use: No     Comment: social    Drug use: No    Sexual activity: Yes     Partners: Male     Birth control/protection: None   Other Topics Concern    Are you pregnant or think you may be? No    Breast-feeding No     Social Determinants of Health     Financial Resource Strain: Unknown (9/6/2023)    Overall Financial Resource Strain (CARDIA)     Difficulty of Paying Living Expenses: Patient refused   Food Insecurity: Unknown (9/6/2023)    Hunger Vital Sign     Worried About Running Out of Food in the Last Year: Patient refused     Ran Out of Food in the Last Year: Patient refused   Transportation Needs: Unknown (9/6/2023)    PRAPARE - Transportation     Lack of Transportation (Medical): Patient refused     Lack of Transportation (Non-Medical): Patient refused   Physical Activity: Insufficiently Active (9/6/2023)    Exercise Vital Sign     Days of Exercise per Week: 3 days     Minutes of Exercise per Session: 30 min   Stress: No Stress Concern Present (9/6/2023)    [place] [employer] - Occupational Stress Questionnaire     Feeling of Stress : Not at all   Social Connections: Unknown (9/6/2023)    Social Connection and Isolation Panel [NHANES]     Frequency of Communication with Friends and Family: Patient refused     Frequency of Social Gatherings with Friends and Family: Patient refused     Active Member of Clubs or Organizations: No     Attends Club or Organization Meetings: Patient refused     Marital Status: Patient refused   Housing Stability: Unknown (7/5/2023)    Housing Stability Vital Sign     Unable to Pay for Housing in the Last Year: Patient refused     Number of Places Lived in the Last Year: 1     Unstable Housing in the Last Year: Patient refused       FAMILY HISTORY:     Family History   Problem Relation Age of Onset    Cancer Mother     Learning disabilities Son     Heart disease Maternal Grandfather     Breast cancer Maternal Grandmother     Colon cancer  "Neg Hx     Ovarian cancer Neg Hx        REVIEW OF SYSTEMS:   Review of Systems   Constitutional:  Negative for chills, diaphoresis and fever.   HENT:  Negative for nosebleeds.    Eyes:  Negative for blurred vision, double vision and photophobia.   Respiratory:  Negative for hemoptysis, shortness of breath and wheezing.    Cardiovascular:  Positive for chest pain. Negative for palpitations, orthopnea, claudication, leg swelling and PND.   Gastrointestinal:  Negative for abdominal pain, blood in stool, heartburn, melena, nausea and vomiting.   Genitourinary:  Negative for flank pain and hematuria.   Musculoskeletal:  Negative for falls, myalgias and neck pain.   Skin:  Negative for rash.   Neurological:  Negative for dizziness, seizures, loss of consciousness, weakness and headaches.   Endo/Heme/Allergies:  Negative for polydipsia. Does not bruise/bleed easily.   Psychiatric/Behavioral:  Negative for depression and memory loss. The patient is not nervous/anxious.        PHYSICAL EXAM:     Vitals:    09/06/23 1528   BP: 126/74   Pulse: 94   Resp: 18    Body mass index is 30.29 kg/m².  Weight: 90.4 kg (199 lb 3 oz)   Height: 5' 8" (172.7 cm)     Physical Exam  Vitals reviewed.   Constitutional:       General: She is not in acute distress.     Appearance: She is well-developed. She is obese. She is not ill-appearing, toxic-appearing or diaphoretic.   HENT:      Head: Normocephalic and atraumatic.   Eyes:      General: No scleral icterus.     Extraocular Movements: Extraocular movements intact.      Conjunctiva/sclera: Conjunctivae normal.      Pupils: Pupils are equal, round, and reactive to light.   Neck:      Thyroid: No thyromegaly.      Vascular: Normal carotid pulses. No carotid bruit or JVD.      Trachea: Trachea normal.   Cardiovascular:      Rate and Rhythm: Normal rate and regular rhythm.      Pulses:           Carotid pulses are 2+ on the right side and 2+ on the left side.     Heart sounds: S1 normal and S2 " normal. No murmur heard.     No friction rub. No gallop.   Pulmonary:      Effort: Pulmonary effort is normal. No respiratory distress.      Breath sounds: Normal breath sounds. No stridor. No wheezing, rhonchi or rales.   Chest:      Chest wall: No tenderness.   Abdominal:      General: There is no distension.      Palpations: Abdomen is soft.   Musculoskeletal:         General: No swelling or tenderness. Normal range of motion.      Cervical back: Normal range of motion and neck supple. No edema or rigidity.      Right lower leg: No edema.      Left lower leg: No edema.   Feet:      Right foot:      Skin integrity: No ulcer.      Left foot:      Skin integrity: No ulcer.   Skin:     General: Skin is warm and dry.      Coloration: Skin is not jaundiced.   Neurological:      General: No focal deficit present.      Mental Status: She is alert and oriented to person, place, and time.      Cranial Nerves: No cranial nerve deficit.   Psychiatric:         Mood and Affect: Mood normal.         Speech: Speech normal.         Behavior: Behavior normal. Behavior is cooperative.         DATA:   EKG: (personally reviewed tracing)  7/5/23 SR 91    Laboratory:  CBC:  Recent Labs   Lab 06/14/21  0820 11/20/21  0838 11/10/22  1330   WBC 6.12 5.73 5.61   Hemoglobin 13.1 12.7 12.7   Hematocrit 39.9 39.1 39.8   Platelets 342 323 294       CHEMISTRIES:  Recent Labs   Lab 12/04/20  1703 02/01/21  0842 06/14/21  0820 11/20/21  0838 10/07/22  0935 11/10/22  1330   Glucose 148 H 189 H 188 H 210 H 164 H 172 H   Sodium 141 139 138 138 137 141   Potassium 5.1 4.0 3.8 4.8 4.8 4.2   BUN 16 10 8 10 10 9   Creatinine 0.7 0.6 0.7 0.6 0.7 0.7   eGFR if non African American >60 >60.0 >60.0 >60.0  --   --    eGFR  --   --   --   --  >60 >60.0   Calcium 9.7 8.7 9.2 8.9 9.4 9.6       CARDIAC BIOMARKERS:        COAGS:        LIPIDS/LFTS:  Recent Labs   Lab 02/01/21  0842 06/14/21  0820 11/20/21  0838 10/07/22  0935 11/10/22  1330   Cholesterol 181  --   143  --  184   Triglycerides 82  --  50  --  156 H   HDL 58  --  51  --  51   LDL Cholesterol 106.6  --  82.0  --  101.8   Non-HDL Cholesterol 123  --  92  --  133   AST 18   < > 51 H 19 18  18   ALT 20   < > 30 17 23  17    < > = values in this interval not displayed.       Cardiovascular Testing:  Ordered  ETT  Echo    ASSESSMENT:   # CP, concerning description but no recurrence  # NIDDM  # dyslipidemia  # BMI 31    PLAN:   Cont med rx  ETT  Echo  Start atorva 20mg qhs  RTC 1 month (Oct 2023, virtual)  Check lipids/LFT 6 months (Mar 2024)    Lenard French MD, Located within [facility]    Addendum 9/14/23    Echo 9/14/23 (images personally reviewed and interpreted)    Left Ventricle: The left ventricle is normal in size. Normal wall thickness. Normal wall motion. There is normal systolic function with a visually estimated ejection fraction of 60 - 65%. Grade I diastolic dysfunction.    Right Ventricle: Normal right ventricular cavity size. Wall thickness is normal. Right ventricle wall motion  is normal. Systolic function is normal.    Pulmonary Artery: The estimated pulmonary artery systolic pressure is 16 mmHg.    ETT 9/14/23 (images personally reviewed and interpreted)    The patient exercised for 6 minutes 38 seconds on a Nick protocol, corresponding to a functional capacity of 8 METS, achieving a peak heart rate of 179 bpm, which is 108 % of the age predicted maximum heart rate. The patient experienced no angina during the test.    The ECG portion of the study is positive for ischemia.  1mm horizontal ST depression.  DTS= +1.5 (intermediate risk).    The patient reported no chest pain during the stress test.    The blood pressure response to stress was normal.    Will schedule Ex MPI prior to follow up OV.

## 2023-09-12 ENCOUNTER — PATIENT MESSAGE (OUTPATIENT)
Dept: BARIATRICS | Facility: CLINIC | Age: 43
End: 2023-09-12
Payer: COMMERCIAL

## 2023-09-13 ENCOUNTER — TELEPHONE (OUTPATIENT)
Dept: CARDIOLOGY | Facility: CLINIC | Age: 43
End: 2023-09-13
Payer: COMMERCIAL

## 2023-09-13 NOTE — TELEPHONE ENCOUNTER
Attempted to contact this patient in regards of rescheduling  stress test. Her contact number is going straight to voicemail and it is currently full.

## 2023-09-13 NOTE — TELEPHONE ENCOUNTER
----- Message from Amber Castro sent at 9/13/2023 12:36 PM CDT -----  Regarding: patient call back  Type: Patient Call Back    Who called: Self     What is the request in detail: Missed her stress test and asked for a call back to get RS.     Can the clinic reply by [facility]? Yes     Would the patient rather a call back or a response via [facility]? Call     Best call back number: .[phone]

## 2023-09-14 ENCOUNTER — HOSPITAL ENCOUNTER (OUTPATIENT)
Dept: CARDIOLOGY | Facility: HOSPITAL | Age: 43
Discharge: HOME | End: 2023-09-14
Attending: INTERNAL MEDICINE
Payer: COMMERCIAL

## 2023-09-14 DIAGNOSIS — R07.9 CHEST PAIN, UNSPECIFIED TYPE: ICD-10-CM

## 2023-09-14 LAB
ASCENDING AORTA: 3.03 CM
AV INDEX (PROSTH): 0.99
AV MEAN GRADIENT: 4 MMHG
AV PEAK GRADIENT: 6 MMHG
AV VALVE AREA BY VELOCITY RATIO: 2.62 CM²
AV VALVE AREA: 3.01 CM²
AV VELOCITY RATIO: 0.86
CV ECHO LV RWT: 0.32 CM
CV STRESS BASE HR: 99 BPM
DIASTOLIC BLOOD PRESSURE: 90 MMHG
DOP CALC AO PEAK VEL: 1.21 M/S
DOP CALC AO VTI: 22.9 CM
DOP CALC LVOT AREA: 3 CM2
DOP CALC LVOT DIAMETER: 1.97 CM
DOP CALC LVOT PEAK VEL: 1.04 M/S
DOP CALC LVOT STROKE VOLUME: 68.85 CM3
DOP CALCLVOT PEAK VEL VTI: 22.6 CM
E WAVE DECELERATION TIME: 159.01 MSEC
E/A RATIO: 0.89
E/E' RATIO: 6.76 M/S
ECHO LV POSTERIOR WALL: 0.69 CM (ref 0.6–1.1)
FRACTIONAL SHORTENING: 39 % (ref 28–44)
INTERVENTRICULAR SEPTUM: 0.61 CM (ref 0.6–1.1)
IVC DIAMETER: 1.79 CM
IVRT: 97.05 MSEC
LA MAJOR: 4.9 CM
LA MINOR: 3.84 CM
LA WIDTH: 3.8 CM
LEFT ATRIUM SIZE: 4.06 CM
LEFT ATRIUM VOLUME: 56.46 CM3
LEFT INTERNAL DIMENSION IN SYSTOLE: 2.6 CM (ref 2.1–4)
LEFT VENTRICLE DIASTOLIC VOLUME: 82.32 ML
LEFT VENTRICLE SYSTOLIC VOLUME: 24.54 ML
LEFT VENTRICULAR INTERNAL DIMENSION IN DIASTOLE: 4.28 CM (ref 3.5–6)
LEFT VENTRICULAR MASS: 79.92 G
LV LATERAL E/E' RATIO: 5.46 M/S
LV SEPTAL E/E' RATIO: 8.88 M/S
LVOT MG: 2.13 MMHG
LVOT MV: 0.67 CM/S
MV PEAK A VEL: 0.8 M/S
MV PEAK E VEL: 0.71 M/S
MV STENOSIS PRESSURE HALF TIME: 46.11 MS
MV VALVE AREA P 1/2 METHOD: 4.77 CM2
OHS CV CPX 1 MINUTE RECOVERY HEART RATE: 146 BPM
OHS CV CPX 85 PERCENT MAX PREDICTED HEART RATE MALE: 141
OHS CV CPX ESTIMATED METS: 8
OHS CV CPX MAX PREDICTED HEART RATE: 166
OHS CV CPX PATIENT IS FEMALE: 1
OHS CV CPX PATIENT IS MALE: 0
OHS CV CPX PEAK DIASTOLIC BLOOD PRESSURE: 72 MMHG
OHS CV CPX PEAK HEAR RATE: 179 BPM
OHS CV CPX PEAK RATE PRESSURE PRODUCT: NORMAL
OHS CV CPX PEAK SYSTOLIC BLOOD PRESSURE: 182 MMHG
OHS CV CPX PERCENT MAX PREDICTED HEART RATE ACHIEVED: 108
OHS CV CPX RATE PRESSURE PRODUCT PRESENTING: NORMAL
PISA TR MAX VEL: 1.77 M/S
PULM VEIN S/D RATIO: 1.63
PV PEAK D VEL: 0.3 M/S
PV PEAK GRADIENT: 6 MMHG
PV PEAK S VEL: 0.49 M/S
PV PEAK VELOCITY: 1.24 M/S
RA MAJOR: 4.75 CM
RA PRESSURE ESTIMATED: 3 MMHG
RA WIDTH: 3.61 CM
RIGHT VENTRICULAR END-DIASTOLIC DIMENSION: 3.55 CM
RV TB RVSP: 5 MMHG
SINUS: 2.77 CM
STJ: 2.4 CM
STRESS ECHO POST EXERCISE DUR MIN: 6 MINUTES
STRESS ECHO POST EXERCISE DUR SEC: 38 SECONDS
STRESS ST DEPRESSION: 1 MM
SYSTOLIC BLOOD PRESSURE: 134 MMHG
TDI LATERAL: 0.13 M/S
TDI SEPTAL: 0.08 M/S
TDI: 0.11 M/S
TR MAX PG: 13 MMHG
TRICUSPID ANNULAR PLANE SYSTOLIC EXCURSION: 2.4 CM
TV PEAK GRADIENT: 1 MMHG
TV REST PULMONARY ARTERY PRESSURE: 16 MMHG

## 2023-09-14 PROCEDURE — 93306 ECHO (CUPID ONLY): ICD-10-PCS | Mod: 26,,, | Performed by: INTERNAL MEDICINE

## 2023-09-14 PROCEDURE — 93016 EXERCISE STRESS - EKG (CUPID ONLY): ICD-10-PCS | Mod: ,,, | Performed by: INTERNAL MEDICINE

## 2023-09-14 PROCEDURE — 93018 CV STRESS TEST I&R ONLY: CPT | Mod: ,,, | Performed by: INTERNAL MEDICINE

## 2023-09-14 PROCEDURE — 93016 CV STRESS TEST SUPVJ ONLY: CPT | Mod: ,,, | Performed by: INTERNAL MEDICINE

## 2023-09-14 PROCEDURE — 93017 CV STRESS TEST TRACING ONLY: CPT

## 2023-09-14 PROCEDURE — 93306 TTE W/DOPPLER COMPLETE: CPT | Mod: 26,,, | Performed by: INTERNAL MEDICINE

## 2023-09-14 PROCEDURE — 93306 TTE W/DOPPLER COMPLETE: CPT

## 2023-09-14 PROCEDURE — 93018 EXERCISE STRESS - EKG (CUPID ONLY): ICD-10-PCS | Mod: ,,, | Performed by: INTERNAL MEDICINE

## 2023-09-18 ENCOUNTER — OFFICE VISIT (OUTPATIENT)
Dept: ORTHOPEDICS | Facility: CLINIC | Age: 43
End: 2023-09-18
Attending: ORTHOPAEDIC SURGERY
Payer: COMMERCIAL

## 2023-09-18 ENCOUNTER — PATIENT MESSAGE (OUTPATIENT)
Dept: CARDIOLOGY | Facility: CLINIC | Age: 43
End: 2023-09-18
Payer: COMMERCIAL

## 2023-09-18 DIAGNOSIS — M25.511 ACUTE PAIN OF RIGHT SHOULDER: Primary | ICD-10-CM

## 2023-09-18 PROCEDURE — 99204 OFFICE O/P NEW MOD 45 MIN: CPT | Mod: S$GLB,,, | Performed by: ORTHOPAEDIC SURGERY

## 2023-09-18 PROCEDURE — 99999 PR PBB SHADOW E&M-EST. PATIENT-LVL IV: CPT | Mod: PBBFAC,,, | Performed by: ORTHOPAEDIC SURGERY

## 2023-09-18 PROCEDURE — 99999 PR PBB SHADOW E&M-EST. PATIENT-LVL IV: ICD-10-PCS | Mod: PBBFAC,,, | Performed by: ORTHOPAEDIC SURGERY

## 2023-09-18 PROCEDURE — 99204 PR OFFICE/OUTPT VISIT, NEW, LEVL IV, 45-59 MIN: ICD-10-PCS | Mod: S$GLB,,, | Performed by: ORTHOPAEDIC SURGERY

## 2023-09-18 RX ORDER — MELOXICAM 7.5 MG/1
7.5 TABLET ORAL DAILY
Qty: 30 TABLET | Refills: 0 | Status: SHIPPED | OUTPATIENT
Start: 2023-09-18 | End: 2024-03-05

## 2023-09-18 NOTE — PROGRESS NOTES
Assessment: 43 y.o. female with right subacromial bursitis     I explained my diagnostic impression and the reasoning behind it in detail, using layman's terms.      Plan:   -  Mobic 7.5 mg PO QD x 2 weeks then PRN. The patient was advised that NSAID-type medications have important potential side effects: gastrointestinal irritation, GI bleeding, cardiac effects and renal injuries. Take the medication with food and to stop and call the office for any GI upset, vomiting, abdominal pain or black/bloody stools. The patient expresses understanding of these issues and questions were answered.  - PT referral to [facility] ian  - Return to clinic in 12 weeks      All questions were answered in detail. The patient is in full agreement with the treatment plan and will proceed accordingly.    Chief Complaint   Patient presents with    Right Shoulder - Pain       Initial visit (9/18/23): Tonya Rivas is a 43 y.o. female who presents today complaining of right shoulder pain  Duration of symptoms:  about 6 weeks   Trauma or new activity: yes - was reaching into her back seat to get groceries out of the back - felt a pop to the anterior shoulder  Was not able to reach overhead, comb her hair etc  Unable to actively reach over head  HEP given by PCP worsening pain   Pain is constant, anterior  Aggravating factors: motion of the shoulder, sleeping on right side   Relieving factors: rest  Night pain is present and is disruptive to sleep  Radicular symptoms: no numbness, paresthesias   Associated symptoms:  limited range of motion.    Prior treatment:  OTC NSAIDs  with temporary  improvement in pain.     Pain does interfere with sleep and activities of daily living .    This patient was seen in consultation at the request of Dr. Hernando Duncan    This is the extent of the patient's complaints at this time.     Hand dominance: Right     Occupation: HD Nurse, utlization       Review of patient's allergies indicates:   Allergen  Reactions    Trijardy xr [empaglifloz-linaglip-metformin] Rash    Bydureon [exenatide microspheres]     Semaglutide Other (See Comments)         Physical Exam:   Vitals:    09/18/23 1535   PainSc:   4   PainLoc: Shoulder       General:  Patient is alert, awake and oriented to time, place and person. Mood and affect are appropriate.  Patient does not appear to be in any distress, denies any constitutional symptoms and appears stated age.   HEENT: Pupils are equal and round, sclera are not injected. External examination of ears and nose reveals no abnormalities. Cranial nerves II-X are grossly intact  Neck: examination demonstrates painless  active range of motion. Spurling's sign is negative  Skin: no rashes, abrasions or open wounds on the affected extremity   Resp: No respiratory distress or audible wheezing   CV: 2+  pulses, all extremities warm and well perfused   Right Shoulder    Shoulder Range of Motion    Right     Left   (Active/Passive)       Forward Elevation     165/165            165/165  External rotation (arm at side)  45/45            45/45   Internal rotation behind the back  L5             L5     Range of motion is painful     Acromioclavicular joint is not tender  Crossbody test: negative    Neer's positive  Hawkin's positive    Tiffanie's negative  Drop arm negative  Belly press negative    Cuff Strength     Right     Left   Supraspinatus        5/5    5/5  Infraspinatus     5/5    5/5  Subscapularis     5/5    5/5    Deltoid testing            5/5    5/5    Speeds negative  Yergasons negative  NTTP over biceps         Elbow examination demonstrates no tenderness to palpation and has normal range of motion.     ltsi C5-T1  + epl, io, fds, fdp   2+ RP      Imaging: 3 views of the right shoulder:  negative for degenerative changes of the AC joint. The humeral head is well centered on the AP and axillary views.  No cortical changes of the greater tuberosity. There is not significant degenerative change  of the glenohumeral joint or posterior subluxation of the humeral head. No acute changes or fracture.      I personally reviewed and interpreted the patient's imaging obtained today in clinic     A note notifying Dr. Hernando Duncan of my findings was sent via the electronic medical record     This note was created by combination of typed  and M-Modal dictation. Transcription and phonetic errors may be present.  If there are any questions, please contact me.      Current Outpatient Medications:     ammonium lactate 12 % Crea, Qhs nails and underneath nails, Disp: 140 g, Rfl: 0    atorvastatin (LIPITOR) 20 MG tablet, Take 1 tablet (20 mg total) by mouth every evening., Disp: 90 tablet, Rfl: 3    augmented betamethasone dipropionate (DIPROLENE-AF) 0.05 % cream, Apply topically 2 (two) times daily., Disp: 50 g, Rfl: 0    blood sugar diagnostic (ONETOUCH ULTRA BLUE TEST STRIP) Strp, 1 strip by Misc.(Non-Drug; Combo Route) route 4 (four) times daily., Disp: 360 strip, Rfl: 3    clobetasoL (TEMOVATE) 0.05 % external solution, Use on affected nails once daily as needed for scaling., Disp: 60 mL, Rfl: 1    diclofenac sodium (VOLTAREN) 1 % Gel, Apply the gel (2 g) to the affected area 4 times daily. Do not apply more than 8 g daily to any one affected joint, Disp: 100 g, Rfl: 1    glimepiride (AMARYL) 2 MG tablet, Take 2 mg by mouth every morning., Disp: , Rfl:     metFORMIN (GLUCOPHAGE) 1000 MG tablet, Take 1 tablet (1,000 mg total) by mouth 2 (two) times daily with meals., Disp: 180 tablet, Rfl: 3    OZEMPIC 2 mg/dose (8 mg/3 mL) PnIj, Inject 2 mg into the skin every 7 days., Disp: , Rfl:     triamcinolone acetonide 0.1% (KENALOG) 0.1 % cream, Apply to affected areas of body BID prn rash. 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     Diabetes mellitus type II     uncontrolled    Herpes simplex virus (HSV)  "infection     "cold sores"    History of  delivery, currently pregnant x2     Kidney stones     Obesity 2013       Active Problem List with Overview Notes    Diagnosis Date Noted    Status post gastric banding 2021    Iron deficiency/low ferritin; normal CBC 06/15/2021    Decreased ROM of right foot 12/15/2020    Decreased strength 12/15/2020    Postural imbalance 12/15/2020    Diabetic retinopathy of left eye associated with type 2 diabetes mellitus 2019    Obstructive sleep apnea 2019    Missed  2019    S/P suction dilation and curettage 2019    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    History of herpes simplex 2019    Type 2 diabetes mellitus with left eye affected by retinopathy without macular edema, without long-term current use of insulin 2016       Past Surgical History:   Procedure Laterality Date    ABDOMINOPLASTY N/A 2021    Procedure: ABDOMINOPLASTY;  Surgeon: Víctor Weston MD;  Location: [facility] OR;  Service: Plastics;  Laterality: N/A;  with routine liposuction     SECTION      x3    DILATION AND CURETTAGE OF UTERUS USING SUCTION N/A 2019    Procedure: DILATION AND CURETTAGE, UTERUS, USING SUCTION;  Surgeon: Tonya Cavanaugh MD;  Location: [facility] OR;  Service: OB/GYN;  Laterality: N/A;    EXTRACORPOREAL SHOCK WAVE LITHOTRIPSY      LAPAROSCOPIC GASTRIC BANDING  2005    LIPOSUCTION OF ABDOMEN N/A 2021    Procedure: LIPOSUCTION, ABDOMEN;  Surgeon: Víctor Weston MD;  Location: [facility] OR;  Service: Plastics;  Laterality: N/A;       Social History     Socioeconomic History    Marital status:    Occupational History    Occupation: RN     Comment: Dialysis   Tobacco Use    Smoking status: Never    Smokeless tobacco: Never   Substance and Sexual Activity    Alcohol use: No     Comment: social    Drug use: No    Sexual activity: Yes     Partners: Male     Birth control/protection: None "   Other Topics Concern    Are you pregnant or think you may be? No    Breast-feeding No     Social Determinants of Health     Financial Resource Strain: Low Risk  (9/18/2023)    Overall Financial Resource Strain (CARDIA)     Difficulty of Paying Living Expenses: Not hard at all   Food Insecurity: No Food Insecurity (9/18/2023)    Hunger Vital Sign     Worried About Running Out of Food in the Last Year: Never true     Ran Out of Food in the Last Year: Never true   Transportation Needs: No Transportation Needs (9/18/2023)    PRAPARE - Transportation     Lack of Transportation (Medical): No     Lack of Transportation (Non-Medical): No   Physical Activity: Insufficiently Active (9/18/2023)    Exercise Vital Sign     Days of Exercise per Week: 3 days     Minutes of Exercise per Session: 30 min   Stress: No Stress Concern Present (9/18/2023)    [place] [employer] - Occupational Stress Questionnaire     Feeling of Stress : Not at all   Social Connections: Unknown (9/18/2023)    Social Connection and Isolation Panel [NHANES]     Frequency of Communication with Friends and Family: More than three times a week     Frequency of Social Gatherings with Friends and Family: Once a week     Active Member of Clubs or Organizations: Patient refused     Attends Club or Organization Meetings: Patient refused     Marital Status:    Housing Stability: Unknown (7/5/2023)    Housing Stability Vital Sign     Unable to Pay for Housing in the Last Year: Patient refused     Unstable Housing in the Last Year: Patient refused

## 2023-09-28 ENCOUNTER — PATIENT MESSAGE (OUTPATIENT)
Dept: OBSTETRICS AND GYNECOLOGY | Facility: CLINIC | Age: 43
End: 2023-09-28
Payer: COMMERCIAL

## 2023-10-02 ENCOUNTER — PATIENT MESSAGE (OUTPATIENT)
Dept: CARDIOLOGY | Facility: CLINIC | Age: 43
End: 2023-10-02
Payer: COMMERCIAL

## 2023-10-04 ENCOUNTER — HOSPITAL ENCOUNTER (OUTPATIENT)
Dept: RADIOLOGY | Facility: HOSPITAL | Age: 43
Discharge: HOME | End: 2023-10-04
Attending: INTERNAL MEDICINE
Payer: COMMERCIAL

## 2023-10-04 ENCOUNTER — PATIENT MESSAGE (OUTPATIENT)
Dept: BARIATRICS | Facility: CLINIC | Age: 43
End: 2023-10-04
Payer: COMMERCIAL

## 2023-10-04 ENCOUNTER — HOSPITAL ENCOUNTER (OUTPATIENT)
Dept: CARDIOLOGY | Facility: HOSPITAL | Age: 43
Discharge: HOME | End: 2023-10-04
Attending: INTERNAL MEDICINE
Payer: COMMERCIAL

## 2023-10-04 DIAGNOSIS — R94.31 ABNORMAL ECG DURING EXERCISE STRESS TEST: ICD-10-CM

## 2023-10-04 DIAGNOSIS — E11.319 TYPE 2 DIABETES MELLITUS WITH LEFT EYE AFFECTED BY RETINOPATHY WITHOUT MACULAR EDEMA, WITHOUT LONG-TERM CURRENT USE OF INSULIN, UNSPECIFIED RETINOPATHY SEVERITY: ICD-10-CM

## 2023-10-04 DIAGNOSIS — E66.9 NON MORBID OBESITY, UNSPECIFIED OBESITY TYPE: ICD-10-CM

## 2023-10-04 DIAGNOSIS — R07.9 CHEST PAIN, UNSPECIFIED TYPE: ICD-10-CM

## 2023-10-04 LAB
CV STRESS BASE HR: 80 BPM
DIASTOLIC BLOOD PRESSURE: 92 MMHG
NUC STRESS EJECTION FRACTION: 70 %
OHS CV CPX 1 MINUTE RECOVERY HEART RATE: 139 BPM
OHS CV CPX 85 PERCENT MAX PREDICTED HEART RATE MALE: 141
OHS CV CPX ESTIMATED METS: 10
OHS CV CPX MAX PREDICTED HEART RATE: 166
OHS CV CPX PATIENT IS FEMALE: 1
OHS CV CPX PATIENT IS MALE: 0
OHS CV CPX PEAK DIASTOLIC BLOOD PRESSURE: 88 MMHG
OHS CV CPX PEAK HEAR RATE: 169 BPM
OHS CV CPX PEAK RATE PRESSURE PRODUCT: NORMAL
OHS CV CPX PEAK SYSTOLIC BLOOD PRESSURE: 166 MMHG
OHS CV CPX PERCENT MAX PREDICTED HEART RATE ACHIEVED: 102
OHS CV CPX RATE PRESSURE PRODUCT PRESENTING: NORMAL
STRESS ECHO POST EXERCISE DUR MIN: 7 MINUTES
STRESS ECHO POST EXERCISE DUR SEC: 40 SECONDS
SYSTOLIC BLOOD PRESSURE: 144 MMHG

## 2023-10-04 PROCEDURE — A9502 TC99M TETROFOSMIN: HCPCS

## 2023-10-04 PROCEDURE — 93016 CV STRESS TEST SUPVJ ONLY: CPT | Mod: ,,, | Performed by: INTERNAL MEDICINE

## 2023-10-04 PROCEDURE — 78452 HT MUSCLE IMAGE SPECT MULT: CPT | Mod: 26,,, | Performed by: INTERNAL MEDICINE

## 2023-10-04 PROCEDURE — 93016 NUCLEAR STRESS - CARDIOLOGY INTERPRETED (CUPID ONLY): ICD-10-PCS | Mod: ,,, | Performed by: INTERNAL MEDICINE

## 2023-10-04 PROCEDURE — 78452 HT MUSCLE IMAGE SPECT MULT: CPT

## 2023-10-04 PROCEDURE — 93018 NUCLEAR STRESS - CARDIOLOGY INTERPRETED (CUPID ONLY): ICD-10-PCS | Mod: ,,, | Performed by: INTERNAL MEDICINE

## 2023-10-04 PROCEDURE — 93017 CV STRESS TEST TRACING ONLY: CPT

## 2023-10-04 PROCEDURE — 93018 CV STRESS TEST I&R ONLY: CPT | Mod: ,,, | Performed by: INTERNAL MEDICINE

## 2023-10-04 PROCEDURE — 78452 NUCLEAR STRESS - CARDIOLOGY INTERPRETED (CUPID ONLY): ICD-10-PCS | Mod: 26,,, | Performed by: INTERNAL MEDICINE

## 2023-10-10 ENCOUNTER — PATIENT MESSAGE (OUTPATIENT)
Dept: BARIATRICS | Facility: CLINIC | Age: 43
End: 2023-10-10
Payer: COMMERCIAL

## 2023-11-07 ENCOUNTER — PATIENT MESSAGE (OUTPATIENT)
Dept: ORTHOPEDICS | Facility: CLINIC | Age: 43
End: 2023-11-07
Payer: COMMERCIAL

## 2023-11-07 ENCOUNTER — CLINICAL SUPPORT (OUTPATIENT)
Dept: REHABILITATION | Facility: HOSPITAL | Age: 43
End: 2023-11-07
Attending: ORTHOPAEDIC SURGERY
Payer: COMMERCIAL

## 2023-11-07 DIAGNOSIS — G89.29 CHRONIC RIGHT SHOULDER PAIN: Primary | ICD-10-CM

## 2023-11-07 DIAGNOSIS — R53.1 DECREASED STRENGTH, ENDURANCE, AND MOBILITY: ICD-10-CM

## 2023-11-07 DIAGNOSIS — M25.611 DECREASED RIGHT SHOULDER RANGE OF MOTION: ICD-10-CM

## 2023-11-07 DIAGNOSIS — R68.89 DECREASED STRENGTH, ENDURANCE, AND MOBILITY: ICD-10-CM

## 2023-11-07 DIAGNOSIS — M25.511 CHRONIC RIGHT SHOULDER PAIN: Primary | ICD-10-CM

## 2023-11-07 DIAGNOSIS — R29.898 SHOULDER WEAKNESS: ICD-10-CM

## 2023-11-07 DIAGNOSIS — M25.511 ACUTE PAIN OF RIGHT SHOULDER: ICD-10-CM

## 2023-11-07 DIAGNOSIS — Z74.09 DECREASED STRENGTH, ENDURANCE, AND MOBILITY: ICD-10-CM

## 2023-11-07 PROBLEM — M25.674 DECREASED ROM OF RIGHT FOOT: Status: RESOLVED | Noted: 2020-12-15 | Resolved: 2023-11-07

## 2023-11-07 PROBLEM — R29.3 POSTURAL IMBALANCE: Status: RESOLVED | Noted: 2020-12-15 | Resolved: 2023-11-07

## 2023-11-07 PROCEDURE — 97110 THERAPEUTIC EXERCISES: CPT | Mod: PN

## 2023-11-07 PROCEDURE — 97161 PT EVAL LOW COMPLEX 20 MIN: CPT | Mod: PN

## 2023-11-07 NOTE — PLAN OF CARE
[facility]  Physical Therapy Initial Evaluation    Date: 11/7/2023   Name: Tonya Rivas  Clinic Number: [id number]    Therapy Diagnosis:   Encounter Diagnoses   Name Primary?    Acute pain of right shoulder     Decreased right shoulder range of motion     Shoulder weakness     Decreased strength, endurance, and mobility      Physician: Anna Medeiros MD    Physician Orders: PT Eval and Treat   Medical Diagnosis from Referral: Acute pain of right shoulder  Evaluation Date: 11/7/2023  Authorization Period Expiration: 12-31-23  Plan of Care Expiration: 2-7-24  Visit # / Visits authorized: 1/ 20    Progress Note Due: 12-7-23  FOTO: 1/1    Precautions: Standard and DM     Time In: 8:15 am  Time Out: 9:00 am  Total Appointment Time (timed & untimed codes): 45 minutes    Subjective   Date of onset: June or July 2013  History of current condition - Tonya reports: that she has anterior R shoulder pain. She has pain when she reach backward, put on shirt. Pt states that she went to M.D. She had X-rays done. Pt states she takes NSAID's. Pt states pain is worse at night. Pt is getting up at night.  Pt denies any numbness and tingling. Pt states pain is localized in the anterior R shoulder. Pt denies any neck pain. Pt states she wakes up at night. 2 times per night.     M.D note:   Initial visit (9/18/23): Tonya Rivas is a 43 y.o. female who presents today complaining of right shoulder pain  Duration of symptoms:  about 6 weeks   Trauma or new activity: yes - was reaching into her back seat to get groceries out of the back - felt a pop to the anterior shoulder  Was not able to reach overhead, comb her hair etc  Unable to actively reach over head  HEP given by PCP worsening pain   Pain is constant, anterior  Aggravating factors: motion of the shoulder, sleeping on right side   Relieving factors: rest  Night pain is present and is disruptive to sleep  Radicular symptoms: no  "numbness, paresthesias   Associated symptoms:  limited range of motion.    Prior treatment:  OTC NSAIDs  with temporary  improvement in pain.     Pain does interfere with sleep and activities of daily living .     This patient was seen in consultation at the request of Dr. Hernando uDncan     This is the extent of the patient's complaints at this time.      Hand dominance: Right     Occupation: HD Nurse, utlization       GORGE: was reaching into her back seat to get groceries out of the back - felt a pop to the anterior shoulder  Any fall: No   Any popping or clicking or locking:  No  Any difficult to elevate shoulder flexion, abd, ER, or IR: Yes  Does pain radiates:  No   Pain constant or intermittent: intermittent   Has done any injections:      Pain:  Current 3/10, worst 9/10, best 0/10   Location: right anterior shoulder    Description: Sharp and stabbing   Aggravating Factors: Reaching backwards, overhead motion, ADL's and IADls.   Easing Factors: Naproxen     Prior Therapy: yes   Social History:  lives with their family  Occupation:  HD Nurse, utlization  Prior Level of Function: independent   Current Level of Function: independent     Pts goals: Get rid of pain     Imaging, bone scan films:   FINDINGS:  The alignment is within normal limits.  No displaced fractures identified.  No evidence of lytic or blastic lesions.Joint spaces are unremarkable.Soft tissues are unremarkable.     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    Medications:   Tonya has a current medication list which includes the following prescription(s): ammonium lactate, atorvastatin, augmented betamethasone dipropionate, blood sugar diagnostic, clobetasol, diclofenac sodium, glimepiride, meloxicam, metformin, ozempic, and triamcinolone acetonide 0.1%.    Allergies:   Review of patient's allergies indicates:   Allergen Reactions    Trijardy xr [empaglifloz-linaglip-metformin] Rash    Bydureon [exenatide microspheres]     Semaglutide Other (See Comments)          Objective       Observation:     Posture Alignment: slouched posture;rounded shoulder    Shoulder rotation at rest: protracted     Sensation: Light touch: intact to light touch    DTR:  intact     GAIT DEVIATIONS: Tonya displays no major deviation ;    Cervical Range of Motion:    Degrees Pain   Flexion WFL no     Extension WFL No     Right Rotation WFL No     Left Rotation WFL No     Right Side Bending WFL No   Left Side Bending WFL No       Active Range of Motion in  (degrees):   Shoulder Right Left   Flexion 139 deg with anterior shoulder pain 180 deg   Abduction    139 deg with anterior shoulder pain    180 deg    ER Functional reach C7 with pain  Functional reach T6   IR  Functional reach L1 with pain  Functional reach T6     Upper Extremity Strength  (R) UE  (L) UE    Shoulder flexion: 4-/5 Shoulder flexion: 4+/5   Shoulder Abduction: 4-/5 Shoulder abduction: 4+/5   Shoulder ER 4-/5 Shoulder ER 4+/5   Shoulder IR 4/5 Shoulder IR 4+/5       Special Tests:    Impingement   Right   Neer's  negative   Ernandez-Yaniv  negative     Rotator Cuff:      Instability:     Right   Sulcus Sign negative   Anterior Apprehension Test negative   Anterior Drawer Test negative   Posterior Apprehension Test negative   Scapular Retraction Test negative     Labrum:     Right   Massena's Test negative   Clunk Test negative        AC Joint/Biceps:     Right   AC Joint Compression Test negative   Speed's test positive         Palpation: Pain a long head of the biceps tendon                  TREATMENT   Treatment Time In: 8:50 am  Treatment Time Out: 9:00 am   Total Treatment time separate from Evaluation: 10 minutes    Tonya received therapeutic exercises to develop strength, endurance, ROM, and flexibility for 10 minutes including:    Supine shoulder flexion 1# wand   Supine shoulder ER 1# wand   Supine pect stretch     Home Exercises and Patient Education Provided    Education provided:   - Perform HEP 2 times per day     Written Home Exercises Provided: Patient instructed to cont prior HEP.  Exercises were reviewed and Tonya was able to demonstrate them prior to the end of the session.  Tonya demonstrated good  understanding of the education provided.     See EMR under Patient Instructions for exercises provided 11/7/2023.    Assessment   Tonya is a 43 y.o. female referred to outpatient Physical Therapy with a medical diagnosis of Acute pain of right shoulder. Pt presents to therapy with R anterior shoulder pain. Pt was positive for Speed's test indicating R long head of biceps tendon inflammation. Pt demonstrated slouched posture and rounded shoulders. Pt demonstrated decrease R shoulder AROM and muscles strength. During palpation,  Pain a long head of the biceps tendon. Pain is limiting functional mobility at home and at work.  Pt will benefit from skilled PT services to decrease pain to return to PLOF.     Pt prognosis is Good.   Pt will benefit from skilled outpatient Physical Therapy to address the deficits stated above and in the chart below, provide pt/family education, and to maximize pt's level of independence.     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    Diabetes mellitus type II    uncontrolled   Herpes simplex virus (HSV) infection    "cold sores"   History of  delivery, currently pregnant x2    Kidney stones    Obesity        Personal Factors:   no deficits     low   Examination  Body Structures and Functions, activity limitations and participation restrictions that may impact the plan of care Body Regions:   Shoulder     Body Systems:    ROM  strength  transfers  transitions  motor control  motor learning    Participation Restrictions:   Community activities and work     Activity limitations:   Learning and applying knowledge  no deficits    General Tasks and Commands  no deficits    Communication  no deficits    Mobility  lifting and carrying objects  fine hand use (grasping/picking up)    Self care  no deficits    Domestic Life  shopping  cooking  doing house work (cleaning house, washing dishes, laundry)    Interactions/Relationships  no deficits    Life Areas  no deficits    Community and Social Life  community life         Complexity: low  See FOTO outcome assessment    Clinical Presentation stable and uncomplicated low   Decision Making/ Complexity Score: low     GOALS: Short Term Goals: 4 weeks  1.Report decreased in pain at worse less than  <   / =  5  /10  to increase tolerance for functional mobility. On going  2. Pt to improve R shoulder range of motion by 25% to allow for improved functional mobility to allow for improvement in IADLs. On going  3. Increased R shoulder  MMT 1/2 grade to increase tolerance for ADL and work activities. On going  4. Pt to report be conscious of impaired sitting and standing posture daily to decrease pain. On going  5. Pt to tolerate HEP to improve ROM and independence with ADL's. On " going  6. Pt will report waking up 1 time per night to demonstrated improvement in R shoulder pain.     Long Term Goals: 8 weeks  1.Report decreased in pain at worse less than  <   / =  2  /10  to increase tolerance for functional mobility. On going  2.  Patient will demonstrate improved overall function per FOTO Survey to CI = at least 1% but < 20% impaired, limited or restricted score or less.On going  3.Increased R shoulder  MMT 1 grade to increase tolerance for ADL and work activities.On going  4. Pt to report and demonstrate proper posture in standing and sitting to decrease pain. On going  5. Pt to be Independent with HEP to improve ROM and independence with ADL's.On going  6. Pt to improve R shoulder range of motion by 75% to allow for improved functional mobility to allow for improvement in IADLs. On going  7. Pt will report waking up 0 time per night to demonstrated improvement in R shoulder pain.     Plan   Plan of care Certification: 11/7/2023 to 2-7-24.    Outpatient Physical Therapy 2 times weekly for 12 weeks to include the following interventions: Manual Therapy, Moist Heat/ Ice, Neuromuscular Re-ed, Patient Education, Therapeutic Activities, and Therapeutic Exercise. Dry needling    Lamberto Martinez, PT      I CERTIFY THE NEED FOR THESE SERVICES FURNISHED UNDER THIS PLAN OF TREATMENT AND WHILE UNDER MY CARE   Physician's comments:     Physician's Signature: ___________________________________________________

## 2023-11-10 ENCOUNTER — CLINICAL SUPPORT (OUTPATIENT)
Dept: REHABILITATION | Facility: HOSPITAL | Age: 43
End: 2023-11-10
Payer: COMMERCIAL

## 2023-11-10 DIAGNOSIS — R53.1 DECREASED STRENGTH, ENDURANCE, AND MOBILITY: ICD-10-CM

## 2023-11-10 DIAGNOSIS — R68.89 DECREASED STRENGTH, ENDURANCE, AND MOBILITY: ICD-10-CM

## 2023-11-10 DIAGNOSIS — Z74.09 DECREASED STRENGTH, ENDURANCE, AND MOBILITY: ICD-10-CM

## 2023-11-10 DIAGNOSIS — M25.511 ACUTE PAIN OF RIGHT SHOULDER: Primary | ICD-10-CM

## 2023-11-10 DIAGNOSIS — M25.611 DECREASED RIGHT SHOULDER RANGE OF MOTION: ICD-10-CM

## 2023-11-10 DIAGNOSIS — R29.898 SHOULDER WEAKNESS: ICD-10-CM

## 2023-11-10 PROCEDURE — 97110 THERAPEUTIC EXERCISES: CPT | Mod: PN

## 2023-11-10 PROCEDURE — 97112 NEUROMUSCULAR REEDUCATION: CPT | Mod: PN

## 2023-11-10 PROCEDURE — 97140 MANUAL THERAPY 1/> REGIONS: CPT | Mod: PN

## 2023-11-10 NOTE — PROGRESS NOTES
[facility]   Physical Therapy Treatment Note     Name: Tonya Blood [facility] Number: [id number]    Therapy Diagnosis:   Encounter Diagnoses   Name Primary?    Acute pain of right shoulder Yes    Decreased right shoulder range of motion     Shoulder weakness     Decreased strength, endurance, and mobility      Physician: Anna Medeiros MD    Visit Date: 11/10/2023    Physician: Anna Medeiros MD     Physician Orders: PT Eval and Treat   Medical Diagnosis from Referral: Acute pain of right shoulder  Evaluation Date: 11/7/2023  Authorization Period Expiration: 12-31-23  Plan of Care Expiration: 2-7-24  Visit # / Visits authorized: 1/ 20    Progress Note Due: 12-7-23  FOTO: 1/1     Precautions: Standard and DM     Time In: 9:30 am  Time Out: 10:25 am  Total Billable Time: 55 minutes      SUBJECTIVE     Pt reports: that she did the HEP. She felt increase in pain, but she states she ice her shoulder with decrease in pain. Pt will have an MRI done on 11-14-23  She was compliant with home exercise program.  Response to previous treatment: No change   Functional change: None    Pain: 3/10  Location: right anterior shoulder  (R Long head of biceps tendon pain)    OBJECTIVE     Objective Measures updated at progress report unless specified.       TREATMENT     Tonya received the treatments listed below:        therapeutic activities to improve functional performance for 00  minutes, including:      received therapeutic exercises to develop strength and ROM for 20   minutes including:    UBE 4 nim  Supine shoulder flexion 1# wand 3x10  Supine shoulder ER 1# wand 3x10   Supine chest press 1# wand 2x10  Supine serratus punch 1# wand 2x10   Supine pect stretch 2 min  Supine shoulder horizontal YTB 2x10    neuromuscular re-education activities to improve: Proprioception, Posture, and motor control  for 25 minutes. The following activities were included:    SL shoulder ER 3x10  SL  shoulder abd 3x10   Standing scapular retraction RTB 2x10  Standing shoulder extension RTB 2x10  Prone shoulder extension 3x10   Prone rows  3x10    received the following manual therapy techniques: Soft tissue Mobilization were applied to the: R shoulder  for 10 minutes, including:  IASTM (R Long head of biceps tendon)  Ice massage (R Long head of biceps tendon)    PATIENT EDUCATION AND HOME EXERCISES     Home Exercises and Patient Education Provided     Education provided:   - Perform HEP 2 times per day      Written Home Exercises Provided: Patient instructed to cont prior HEP.  Exercises were reviewed and Tonya was able to demonstrate them prior to the end of the session.  Tonya demonstrated good  understanding of the education provided.      See EMR under Patient Instructions for exercises provided 11/7/2023.       ASSESSMENT     Pt presented to the first f/u. Pt still have pain on the R long head of the biceps tendon. Pt demonstrated humerus head translate forward. Pt demonstrated increase in pain during shoulder flexion AAROM. Pt has decrease R shoulder muscles strength and endurance. Postural and scapulothoracic rhythm exercises were performed today. Pt has decrease motor control of rotator cuff and postural muscles. Pt required mod v/c's to perform exercises with proper form and proper muscles activation. Manual therapy performed to R long head of the biceps tendon. Ice massage and IASTM. Plan to cont POC.     Tonya Is progressing well towards her goals.   Pt prognosis is Good.     Pt will continue to benefit from skilled outpatient physical therapy to address the deficits listed in the problem list box on initial evaluation, provide pt/family education and to maximize pt's level of independence in the home and community environment.     Pt's spiritual, cultural and educational needs considered and pt agreeable to plan of care and goals.     Anticipated barriers to physical therapy: scheduling issues      GOALS: Short Term Goals: 4 weeks  1.Report decreased in pain at worse less than  <   / =  5  /10  to increase tolerance for functional mobility. On going  2. Pt to improve R shoulder range of motion by 25% to allow for improved functional mobility to allow for improvement in IADLs. On going  3. Increased R shoulder  MMT 1/2 grade to increase tolerance for ADL and work activities. On going  4. Pt to report be conscious of impaired sitting and standing posture daily to decrease pain. On going  5. Pt to tolerate HEP to improve ROM and independence with ADL's. On going  6. Pt will report waking up 1 time per night to demonstrated improvement in R shoulder pain.      Long Term Goals: 8 weeks  1.Report decreased in pain at worse less than  <   / =  2  /10  to increase tolerance for functional mobility. On going  2.  Patient will demonstrate improved overall function per FOTO Survey to CI = at least 1% but < 20% impaired, limited or restricted score or less.On going  3.Increased R shoulder  MMT 1 grade to increase tolerance for ADL and work activities.On going  4. Pt to report and demonstrate proper posture in standing and sitting to decrease pain. On going  5. Pt to be Independent with HEP to improve ROM and independence with ADL's.On going  6. Pt to improve R shoulder range of motion by 75% to allow for improved functional mobility to allow for improvement in IADLs. On going  7. Pt will report waking up 0 time per night to demonstrated improvement in R shoulder pain.     PLAN     Cont POC    Lamberto Martinez, PT

## 2023-11-14 ENCOUNTER — HOSPITAL ENCOUNTER (OUTPATIENT)
Dept: RADIOLOGY | Facility: HOSPITAL | Age: 43
Discharge: HOME | End: 2023-11-14
Attending: ORTHOPAEDIC SURGERY
Payer: COMMERCIAL

## 2023-11-14 ENCOUNTER — PATIENT MESSAGE (OUTPATIENT)
Dept: BARIATRICS | Facility: CLINIC | Age: 43
End: 2023-11-14
Payer: COMMERCIAL

## 2023-11-14 DIAGNOSIS — M25.511 CHRONIC RIGHT SHOULDER PAIN: ICD-10-CM

## 2023-11-14 DIAGNOSIS — G89.29 CHRONIC RIGHT SHOULDER PAIN: ICD-10-CM

## 2023-11-14 PROCEDURE — 73221 MRI SHOULDER WITHOUT CONTRAST RIGHT: ICD-10-PCS | Mod: 26,RT,, | Performed by: RADIOLOGY

## 2023-11-14 PROCEDURE — 73221 MRI JOINT UPR EXTREM W/O DYE: CPT | Mod: TC,RT

## 2023-11-14 PROCEDURE — 73221 MRI JOINT UPR EXTREM W/O DYE: CPT | Mod: 26,RT,, | Performed by: RADIOLOGY

## 2023-11-15 ENCOUNTER — PATIENT MESSAGE (OUTPATIENT)
Dept: ORTHOPEDICS | Facility: CLINIC | Age: 43
End: 2023-11-15
Payer: COMMERCIAL

## 2023-11-16 DIAGNOSIS — E11.9 TYPE 2 DIABETES MELLITUS WITHOUT COMPLICATION: ICD-10-CM

## 2023-11-16 DIAGNOSIS — Z12.31 OTHER SCREENING MAMMOGRAM: ICD-10-CM

## 2023-11-17 ENCOUNTER — DOCUMENTATION ONLY (OUTPATIENT)
Dept: REHABILITATION | Facility: HOSPITAL | Age: 43
End: 2023-11-17
Payer: COMMERCIAL

## 2023-11-17 NOTE — PROGRESS NOTES
[facility]                          No Show Therapy Appointment     Tonya Rivas  MRN: [id number]    Patient no shows to today's therapy appointment on 11/17/2023 for 1:45pm.     Marina France PTA  11/17/2023

## 2023-11-20 ENCOUNTER — PATIENT MESSAGE (OUTPATIENT)
Dept: ADMINISTRATIVE | Facility: HOSPITAL | Age: 43
End: 2023-11-20
Payer: COMMERCIAL

## 2023-11-21 DIAGNOSIS — M25.511 ACUTE PAIN OF RIGHT SHOULDER: ICD-10-CM

## 2023-11-21 RX ORDER — DICLOFENAC SODIUM 10 MG/G
GEL TOPICAL
Qty: 100 G | Refills: 1 | Status: SHIPPED | OUTPATIENT
Start: 2023-11-21

## 2023-11-21 NOTE — TELEPHONE ENCOUNTER
Care Due:                  Date            Visit Type   Department     Provider  --------------------------------------------------------------------------------                                [facility]                              FOLLOWUP/OF  MED/ INTERNAL  Last Visit: 08-      [facility]ICE VISIT   MED/ RUSTAM Duncan  Next Visit: None Scheduled  None         None Found                                                            Last  Test          Frequency    Reason                     Performed    Due Date  --------------------------------------------------------------------------------    CBC.........  12 months..  diclofenac...............  11-   11-    Cr..........  12 months..  diclofenac...............  11- 11-    [employer] Embedded Care Due Messages. Reference number: [id number].   11/21/2023 12:49:16 AM CST

## 2023-11-22 ENCOUNTER — PATIENT MESSAGE (OUTPATIENT)
Dept: ADMINISTRATIVE | Facility: HOSPITAL | Age: 43
End: 2023-11-22
Payer: COMMERCIAL

## 2023-11-24 ENCOUNTER — DOCUMENTATION ONLY (OUTPATIENT)
Dept: REHABILITATION | Facility: HOSPITAL | Age: 43
End: 2023-11-24
Payer: COMMERCIAL

## 2023-11-24 NOTE — PROGRESS NOTES
[facility]                          No shows Therapy Appointment     Tonya Rivas  MRN: [id number]    Patient no shows to today's therapy appointment on 11/24/2023 for 4:00pm.     Marina France, FAMILIA  11/24/2023

## 2023-11-28 ENCOUNTER — CLINICAL SUPPORT (OUTPATIENT)
Dept: REHABILITATION | Facility: HOSPITAL | Age: 43
End: 2023-11-28
Payer: COMMERCIAL

## 2023-11-28 DIAGNOSIS — M25.511 ACUTE PAIN OF RIGHT SHOULDER: Primary | ICD-10-CM

## 2023-11-28 DIAGNOSIS — R53.1 DECREASED STRENGTH, ENDURANCE, AND MOBILITY: ICD-10-CM

## 2023-11-28 DIAGNOSIS — Z74.09 DECREASED STRENGTH, ENDURANCE, AND MOBILITY: ICD-10-CM

## 2023-11-28 DIAGNOSIS — R29.898 SHOULDER WEAKNESS: ICD-10-CM

## 2023-11-28 DIAGNOSIS — R68.89 DECREASED STRENGTH, ENDURANCE, AND MOBILITY: ICD-10-CM

## 2023-11-28 DIAGNOSIS — M25.611 DECREASED RIGHT SHOULDER RANGE OF MOTION: ICD-10-CM

## 2023-11-28 PROCEDURE — 97110 THERAPEUTIC EXERCISES: CPT | Mod: PN,CQ

## 2023-11-28 PROCEDURE — 97112 NEUROMUSCULAR REEDUCATION: CPT | Mod: PN,CQ

## 2023-11-28 PROCEDURE — 97140 MANUAL THERAPY 1/> REGIONS: CPT | Mod: PN,CQ

## 2023-11-28 NOTE — PROGRESS NOTES
[facility]   Physical Therapy Treatment Note     Name: Tonya Blood [facility] Number: [id number]    Therapy Diagnosis:   Encounter Diagnoses   Name Primary?    Acute pain of right shoulder Yes    Decreased right shoulder range of motion     Shoulder weakness     Decreased strength, endurance, and mobility      Physician: Anna Medeiros MD    Visit Date: 11/28/2023    Physician: Anna Medeiros MD     Physician Orders: PT Eval and Treat   Medical Diagnosis from Referral: Acute pain of right shoulder  Evaluation Date: 11/7/2023  Authorization Period Expiration: 12-31-23  Plan of Care Expiration: 2-7-24  Visit # / Visits authorized: 2/ 20    Progress Note Due: 12-7-23  FOTO: 1/1     Precautions: Standard and DM     Time In: 1700  Time Out: 1755  Total Billable Time: 55 minutes      SUBJECTIVE     Pt reports: her shoulder felt better since the last therapy visit. It only hurts when she moves a certain way.   She was compliant with home exercise program.  Response to previous treatment: No change   Functional change: None    Pain: 3/10  Location: right anterior shoulder  (R Long head of biceps tendon pain)    OBJECTIVE     Objective Measures updated at progress report unless specified.       TREATMENT     Tonya received the treatments listed below:        therapeutic activities to improve functional performance for 00  minutes, including:      received therapeutic exercises to develop strength and ROM for 20   minutes including:    UBE 4 nim  Supine shoulder flexion 2# wand 3x10  Supine shoulder ER 2# wand 3x10   Supine chest press 2# wand 3x10  Supine serratus punch 2# wand 3x10   Supine pect stretch 2 min - defer today   Supine shoulder horizontal YTB 2x10    neuromuscular re-education activities to improve: Proprioception, Posture, and motor control  for 25 minutes. The following activities were included:    SL shoulder ER 3x10  SL shoulder abd 3x10   Standing scapular  retraction RTB 2x10  Standing shoulder extension RTB 2x10  Prone shoulder extension 3x10   Prone rows  3x10    received the following manual therapy techniques: Soft tissue Mobilization were applied to the: R shoulder  for 10 minutes, including:  IASTM (R Long head of biceps tendon)  Ice massage (R Long head of biceps tendon)    PATIENT EDUCATION AND HOME EXERCISES     Home Exercises and Patient Education Provided     Education provided:   - Perform HEP 2 times per day      Written Home Exercises Provided: Patient instructed to cont prior HEP.  Exercises were reviewed and Tonya was able to demonstrate them prior to the end of the session.  Tonya demonstrated good  understanding of the education provided.      See EMR under Patient Instructions for exercises provided 11/7/2023.       ASSESSMENT     Patient appears to have shown less pain since the last therapy visit. Continued with the exercises and manual therapy techniques for decrease pain and to improve overall shoulder functional mobility. Noted patient displayed very weak scapular muscles activation in shoulder rows and shoulder extension. Provided cues for proper muscles activation here. Ended session with graston and ice to right shoulder. Overall, patient responded well during treatment.     Tonya Is progressing well towards her goals.   Pt prognosis is Good.     Pt will continue to benefit from skilled outpatient physical therapy to address the deficits listed in the problem list box on initial evaluation, provide pt/family education and to maximize pt's level of independence in the home and community environment.     Pt's spiritual, cultural and educational needs considered and pt agreeable to plan of care and goals.     Anticipated barriers to physical therapy: scheduling issues     GOALS: Short Term Goals: 4 weeks  1.Report decreased in pain at worse less than  <   / =  5  /10  to increase tolerance for functional mobility. On going  2. Pt to  improve R shoulder range of motion by 25% to allow for improved functional mobility to allow for improvement in IADLs. On going  3. Increased R shoulder  MMT 1/2 grade to increase tolerance for ADL and work activities. On going  4. Pt to report be conscious of impaired sitting and standing posture daily to decrease pain. On going  5. Pt to tolerate HEP to improve ROM and independence with ADL's. On going  6. Pt will report waking up 1 time per night to demonstrated improvement in R shoulder pain.      Long Term Goals: 8 weeks  1.Report decreased in pain at worse less than  <   / =  2  /10  to increase tolerance for functional mobility. On going  2.  Patient will demonstrate improved overall function per FOTO Survey to CI = at least 1% but < 20% impaired, limited or restricted score or less.On going  3.Increased R shoulder  MMT 1 grade to increase tolerance for ADL and work activities.On going  4. Pt to report and demonstrate proper posture in standing and sitting to decrease pain. On going  5. Pt to be Independent with HEP to improve ROM and independence with ADL's.On going  6. Pt to improve R shoulder range of motion by 75% to allow for improved functional mobility to allow for improvement in IADLs. On going  7. Pt will report waking up 0 time per night to demonstrated improvement in R shoulder pain.     PLAN     Cont POC    Marina France, PTA

## 2023-12-11 ENCOUNTER — OFFICE VISIT (OUTPATIENT)
Dept: ORTHOPEDICS | Facility: CLINIC | Age: 43
End: 2023-12-11
Payer: COMMERCIAL

## 2023-12-11 DIAGNOSIS — M25.511 CHRONIC RIGHT SHOULDER PAIN: Primary | ICD-10-CM

## 2023-12-11 DIAGNOSIS — G89.29 CHRONIC RIGHT SHOULDER PAIN: Primary | ICD-10-CM

## 2023-12-11 PROCEDURE — 99213 OFFICE O/P EST LOW 20 MIN: CPT | Mod: S$GLB,,, | Performed by: ORTHOPAEDIC SURGERY

## 2023-12-11 PROCEDURE — 99999 PR PBB SHADOW E&M-EST. PATIENT-LVL III: CPT | Mod: PBBFAC,,, | Performed by: ORTHOPAEDIC SURGERY

## 2023-12-11 PROCEDURE — 99999 PR PBB SHADOW E&M-EST. PATIENT-LVL III: ICD-10-PCS | Mod: PBBFAC,,, | Performed by: ORTHOPAEDIC SURGERY

## 2023-12-11 PROCEDURE — 99213 PR OFFICE/OUTPT VISIT, EST, LEVL III, 20-29 MIN: ICD-10-PCS | Mod: S$GLB,,, | Performed by: ORTHOPAEDIC SURGERY

## 2023-12-11 NOTE — PROGRESS NOTES
Assessment: 43 y.o. female with right subacromial bursitis     I explained my diagnostic impression and the reasoning behind it in detail, using layman's terms.      Plan:   -  Mobic PRN  -  Continue HEP  - RTC PRN    All questions were answered in detail. The patient is in full agreement with the treatment plan and will proceed accordingly.    Chief Complaint   Patient presents with    Right Shoulder - Pain       Initial visit (9/18/23): Tonya Rivas is a 43 y.o. female who presents today complaining of right shoulder pain  Duration of symptoms:  about 6 weeks   Trauma or new activity: yes - was reaching into her back seat to get groceries out of the back - felt a pop to the anterior shoulder  Was not able to reach overhead, comb her hair etc  Unable to actively reach over head  HEP given by PCP worsening pain   Pain is constant, anterior  Aggravating factors: motion of the shoulder, sleeping on right side   Relieving factors: rest  Night pain is present and is disruptive to sleep  Radicular symptoms: no numbness, paresthesias   Associated symptoms:  limited range of motion.    Prior treatment:  OTC NSAIDs  with temporary  improvement in pain.     Pain does interfere with sleep and activities of daily living .    12/11/23  Here for follow-up of right shoulder pain   MRI was obtained since her last visit.  This shows partial rotator cuff tear  She continued therapy and now feels better.  She still has some pain with reaching behind her back but it is more manageable    This is the extent of the patient's complaints at this time.     Hand dominance: Right     Occupation: HD Nurse, utlization       Review of patient's allergies indicates:   Allergen Reactions    Trijardy xr [empaglifloz-linaglip-metformin] Rash    Bydureon [exenatide microspheres]     Semaglutide Other (See Comments)         Physical Exam:   Vitals:    12/11/23 1527   PainSc: 0-No pain       General:  Patient is alert, awake and oriented to  time, place and person. Mood and affect are appropriate.  Patient does not appear to be in any distress, denies any constitutional symptoms and appears stated age.   HEENT: Pupils are equal and round, sclera are not injected. External examination of ears and nose reveals no abnormalities. Cranial nerves II-X are grossly intact  Neck: examination demonstrates painless  active range of motion. Spurling's sign is negative  Skin: no rashes, abrasions or open wounds on the affected extremity   Resp: No respiratory distress or audible wheezing   CV: 2+  pulses, all extremities warm and well perfused   Right Shoulder    Shoulder Range of Motion    Right     Left   (Active/Passive)       Forward Elevation     165/165            165/165  External rotation (arm at side)  45/45             45/45   Internal rotation behind the back  L5             L5     Range of motion is painful     Acromioclavicular joint is not tender  Crossbody test: negative    Neer's negative  Hawkin's positive    Tiffanie's negative  Drop arm negative  Belly press negative    Cuff Strength     Right     Left   Supraspinatus        5/5    5/5  Infraspinatus     5/5    5/5  Subscapularis     5/5    5/5    Deltoid testing            5/5    5/5    Speeds negative  Yergasons negative  NTTP over biceps     Elbow examination demonstrates no tenderness to palpation and has normal range of motion.     ltsi C5-T1  + epl, io, fds, fdp   2+ RP      Imaging: 3 views of the right shoulder:  negative for degenerative changes of the AC joint. The humeral head is well centered on the AP and axillary views.  No cortical changes of the greater tuberosity. There is not significant degenerative change of the glenohumeral joint or posterior subluxation of the humeral head. No acute changes or fracture.      MRI shows a partial tear of the supraspinatus    I personally reviewed and interpreted the patient's imaging obtained prior to visit       This note was created by  "combination of typed  and M-Modal dictation. Transcription and phonetic errors may be present.  If there are any questions, please contact me.      Current Outpatient Medications:     ammonium lactate 12 % Crea, Qhs nails and underneath nails, Disp: 140 g, Rfl: 0    atorvastatin (LIPITOR) 20 MG tablet, Take 1 tablet (20 mg total) by mouth every evening., Disp: 90 tablet, Rfl: 3    augmented betamethasone dipropionate (DIPROLENE-AF) 0.05 % cream, Apply topically 2 (two) times daily., Disp: 50 g, Rfl: 0    blood sugar diagnostic (ONETOUCH ULTRA BLUE TEST STRIP) Strp, 1 strip by Misc.(Non-Drug; Combo Route) route 4 (four) times daily., Disp: 360 strip, Rfl: 3    clobetasoL (TEMOVATE) 0.05 % external solution, Use on affected nails once daily as needed for scaling., Disp: 60 mL, Rfl: 1    diclofenac sodium (VOLTAREN) 1 % Gel, APPLY 2 GRAMS ON THE AFFECTED AREA 4 TIMES A DAY. MAX 8 GRAMS A DAY TO 1 JOINT, Disp: 100 g, Rfl: 1    metFORMIN (GLUCOPHAGE) 1000 MG tablet, Take 1 tablet (1,000 mg total) by mouth 2 (two) times daily with meals., Disp: 180 tablet, Rfl: 3    OZEMPIC 2 mg/dose (8 mg/3 mL) PnIj, Inject 2 mg into the skin every 7 days., Disp: , Rfl:     triamcinolone acetonide 0.1% (KENALOG) 0.1 % cream, Apply to affected areas of body BID prn rash. 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     Diabetes mellitus type II     uncontrolled    Herpes simplex virus (HSV) infection     "cold sores"    History of  delivery, currently pregnant x2     Kidney stones     Obesity 2013       Active Problem List with Overview Notes    Diagnosis Date Noted    Acute pain of right shoulder 2023    " Decreased right shoulder range of motion 2023    Shoulder weakness 2023    Decreased strength, endurance, and mobility 2023    Status post gastric banding 2021    Iron deficiency/low ferritin; normal CBC 06/15/2021    Diabetic retinopathy of left eye associated with type 2 diabetes mellitus 2019    Obstructive sleep apnea 2019    Missed  2019    S/P suction dilation and curettage 2019    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    History of herpes simplex 2019    Type 2 diabetes mellitus with left eye affected by retinopathy without macular edema, without long-term current use of insulin 2016       Past Surgical History:   Procedure Laterality Date    ABDOMINOPLASTY N/A 2021    Procedure: ABDOMINOPLASTY;  Surgeon: Víctor Weston MD;  Location: [facility] OR;  Service: Plastics;  Laterality: N/A;  with routine liposuction     SECTION      x3    DILATION AND CURETTAGE OF UTERUS USING SUCTION N/A 2019    Procedure: DILATION AND CURETTAGE, UTERUS, USING SUCTION;  Surgeon: Tonya Cavanaugh MD;  Location: [facility] OR;  Service: OB/GYN;  Laterality: N/A;    EXTRACORPOREAL SHOCK WAVE LITHOTRIPSY      LAPAROSCOPIC GASTRIC BANDING      LIPOSUCTION OF ABDOMEN N/A 2021    Procedure: LIPOSUCTION, ABDOMEN;  Surgeon: Víctor Weston MD;  Location: [facility];  Service: Plastics;  Laterality: N/A;       Social History     Socioeconomic History    Marital status:    Occupational History    Occupation: RN     Comment: Dialysis   Tobacco Use    Smoking status: Never    Smokeless tobacco: Never   Substance and Sexual Activity    Alcohol use: No     Comment: social    Drug use: No    Sexual activity: Yes     Partners: Male     Birth control/protection: None   Other Topics Concern    Are you pregnant or think you may be? No    Breast-feeding No     Social Determinants of Health     Financial Resource Strain: Low Risk   (12/11/2023)    Overall Financial Resource Strain (CARDIA)     Difficulty of Paying Living Expenses: Not hard at all   Food Insecurity: No Food Insecurity (12/11/2023)    Hunger Vital Sign     Worried About Running Out of Food in the Last Year: Never true     Ran Out of Food in the Last Year: Never true   Transportation Needs: No Transportation Needs (12/11/2023)    PRAPARE - Transportation     Lack of Transportation (Medical): No     Lack of Transportation (Non-Medical): No   Physical Activity: Insufficiently Active (12/11/2023)    Exercise Vital Sign     Days of Exercise per Week: 3 days     Minutes of Exercise per Session: 30 min   Stress: No Stress Concern Present (12/11/2023)    [employer] [place] [employer] - Occupational Stress Questionnaire     Feeling of Stress : Not at all   Social Connections: Unknown (12/11/2023)    Social Connection and Isolation Panel [NHANES]     Frequency of Communication with Friends and Family: More than three times a week     Frequency of Social Gatherings with Friends and Family: Once a week     Active Member of Clubs or Organizations: No     Attends Club or Organization Meetings: 1 to 4 times per year     Marital Status:    Housing Stability: High Risk (12/11/2023)    Housing Stability Vital Sign     Unable to Pay for Housing in the Last Year: Yes     Number of Places Lived in the Last Year: 1     Unstable Housing in the Last Year: No

## 2023-12-12 ENCOUNTER — PATIENT MESSAGE (OUTPATIENT)
Dept: BARIATRICS | Facility: CLINIC | Age: 43
End: 2023-12-12
Payer: COMMERCIAL

## 2023-12-13 ENCOUNTER — PATIENT MESSAGE (OUTPATIENT)
Dept: BARIATRICS | Facility: CLINIC | Age: 43
End: 2023-12-13
Payer: COMMERCIAL

## 2024-01-03 ENCOUNTER — PATIENT MESSAGE (OUTPATIENT)
Dept: ADMINISTRATIVE | Facility: HOSPITAL | Age: 44
End: 2024-01-03
Payer: COMMERCIAL

## 2024-01-05 ENCOUNTER — PATIENT OUTREACH (OUTPATIENT)
Dept: ADMINISTRATIVE | Facility: HOSPITAL | Age: 44
End: 2024-01-05
Payer: COMMERCIAL

## 2024-01-09 ENCOUNTER — PATIENT MESSAGE (OUTPATIENT)
Dept: BARIATRICS | Facility: CLINIC | Age: 44
End: 2024-01-09
Payer: COMMERCIAL

## 2024-01-18 ENCOUNTER — PATIENT MESSAGE (OUTPATIENT)
Dept: ADMINISTRATIVE | Facility: HOSPITAL | Age: 44
End: 2024-01-18
Payer: COMMERCIAL

## 2024-01-24 DIAGNOSIS — E11.9 TYPE 2 DIABETES MELLITUS WITHOUT COMPLICATION: ICD-10-CM

## 2024-01-29 ENCOUNTER — PATIENT MESSAGE (OUTPATIENT)
Dept: ADMINISTRATIVE | Facility: HOSPITAL | Age: 44
End: 2024-01-29
Payer: COMMERCIAL

## 2024-02-14 ENCOUNTER — PATIENT MESSAGE (OUTPATIENT)
Dept: BARIATRICS | Facility: CLINIC | Age: 44
End: 2024-02-14
Payer: COMMERCIAL

## 2024-02-20 ENCOUNTER — PATIENT MESSAGE (OUTPATIENT)
Dept: BARIATRICS | Facility: CLINIC | Age: 44
End: 2024-02-20
Payer: COMMERCIAL

## 2024-02-29 ENCOUNTER — PATIENT MESSAGE (OUTPATIENT)
Dept: BARIATRICS | Facility: CLINIC | Age: 44
End: 2024-02-29
Payer: COMMERCIAL

## 2024-03-05 RX ORDER — MELOXICAM 7.5 MG/1
7.5 TABLET ORAL
Qty: 30 TABLET | Refills: 0 | Status: SHIPPED | OUTPATIENT
Start: 2024-03-05 | End: 2024-04-23 | Stop reason: SDUPTHER

## 2024-03-06 ENCOUNTER — PATIENT MESSAGE (OUTPATIENT)
Dept: BARIATRICS | Facility: CLINIC | Age: 44
End: 2024-03-06
Payer: COMMERCIAL

## 2024-04-03 ENCOUNTER — PATIENT MESSAGE (OUTPATIENT)
Dept: BARIATRICS | Facility: CLINIC | Age: 44
End: 2024-04-03
Payer: COMMERCIAL

## 2024-04-09 ENCOUNTER — PATIENT MESSAGE (OUTPATIENT)
Dept: BARIATRICS | Facility: CLINIC | Age: 44
End: 2024-04-09
Payer: COMMERCIAL

## 2024-04-18 ENCOUNTER — PATIENT MESSAGE (OUTPATIENT)
Dept: ADMINISTRATIVE | Facility: HOSPITAL | Age: 44
End: 2024-04-18
Payer: COMMERCIAL

## 2024-04-23 RX ORDER — MELOXICAM 7.5 MG/1
7.5 TABLET ORAL DAILY
Qty: 30 TABLET | Refills: 0 | Status: SHIPPED | OUTPATIENT
Start: 2024-04-23 | End: 2024-05-21

## 2024-05-02 LAB — A1C: 7.1

## 2024-05-21 RX ORDER — MELOXICAM 7.5 MG/1
7.5 TABLET ORAL
Qty: 30 TABLET | Refills: 0 | Status: SHIPPED | OUTPATIENT
Start: 2024-05-21

## 2024-06-03 ENCOUNTER — PATIENT MESSAGE (OUTPATIENT)
Dept: DERMATOLOGY | Facility: CLINIC | Age: 44
End: 2024-06-03
Payer: COMMERCIAL

## 2024-06-05 ENCOUNTER — OFFICE VISIT (OUTPATIENT)
Dept: DERMATOLOGY | Facility: CLINIC | Age: 44
End: 2024-06-05
Payer: COMMERCIAL

## 2024-06-05 DIAGNOSIS — L60.3 ONYCHODYSTROPHY: ICD-10-CM

## 2024-06-05 DIAGNOSIS — L81.9 HYPERPIGMENTATION: ICD-10-CM

## 2024-06-05 DIAGNOSIS — Z76.89 ENCOUNTER FOR SKIN CARE: ICD-10-CM

## 2024-06-05 DIAGNOSIS — R21 RASH: Primary | ICD-10-CM

## 2024-06-05 PROCEDURE — 99999 PR PBB SHADOW E&M-EST. PATIENT-LVL III: CPT | Mod: PBBFAC,,, | Performed by: DERMATOLOGY

## 2024-06-05 PROCEDURE — 99214 OFFICE O/P EST MOD 30 MIN: CPT | Mod: S$GLB,,, | Performed by: DERMATOLOGY

## 2024-06-05 RX ORDER — AMMONIUM LACTATE 12 G/100G
LOTION TOPICAL
Refills: 0 | Status: CANCELLED | OUTPATIENT
Start: 2024-06-05

## 2024-06-05 RX ORDER — FLUOCINONIDE 0.5 MG/G
CREAM TOPICAL 2 TIMES DAILY
Qty: 60 G | Refills: 1 | Status: SHIPPED | OUTPATIENT
Start: 2024-06-05

## 2024-06-05 RX ORDER — AMMONIUM LACTATE 12 G/100G
CREAM TOPICAL
Qty: 140 G | Refills: 3 | Status: SHIPPED | OUTPATIENT
Start: 2024-06-05

## 2024-06-05 NOTE — PROGRESS NOTES
Subjective:      Patient ID:  Tonya Rivas is a 43 y.o. female who presents for   Chief Complaint   Patient presents with    Rash    Hyperpigmentation     Inner thighs     Psoriasis     Rash - Initial  Affected locations: right arm and left arm  Duration: 1 week  Signs / symptoms: itching  Severity: mild to moderate  Timing: constant    Hyperpigmentation - Initial  Affected locations: right upper leg and left upper leg  Severity: mild to moderate  Timing: constant  Treatments tried: hydroquinone 8%  Improvement on treatment: no relief    Psoriasis - Initial  Affected locations: right fingers and left fingers  Signs / symptoms: dryness  Severity: mild to moderate        Review of Systems   Constitutional: Negative.    HENT: Negative.     Respiratory: Negative.     Musculoskeletal: Negative.    Skin:  Positive for rash.       Objective:   Physical Exam   Constitutional: She appears well-developed and well-nourished.   Neurological: She is alert and oriented to person, place, and time.   Psychiatric: She has a normal mood and affect.   Skin:                   Diagram Legend     Erythematous scaling macule/papule c/w actinic keratosis       Vascular papule c/w angioma      Pigmented verrucoid papule/plaque c/w seborrheic keratosis      Yellow umbilicated papule c/w sebaceous hyperplasia      Irregularly shaped tan macule c/w lentigo     1-2 mm smooth white papules consistent with Milia      Movable subcutaneous cyst with punctum c/w epidermal inclusion cyst      Subcutaneous movable cyst c/w pilar cyst      Firm pink to brown papule c/w dermatofibroma      Pedunculated fleshy papule(s) c/w skin tag(s)      Evenly pigmented macule c/w junctional nevus     Mildly variegated pigmented, slightly irregular-bordered macule c/w mildly atypical nevus      Flesh colored to evenly pigmented papule c/w intradermal nevus       Pink pearly papule/plaque c/w basal cell carcinoma      Erythematous hyperkeratotic cursted  plaque c/w SCC      Surgical scar with no sign of skin cancer recurrence      Open and closed comedones      Inflammatory papules and pustules      Verrucoid papule consistent consistent with wart     Erythematous eczematous patches and plaques     Dystrophic onycholytic nail with subungual debris c/w onychomycosis     Umbilicated papule    Erythematous-base heme-crusted tan verrucoid plaque consistent with inflamed seborrheic keratosis     Erythematous Silvery Scaling Plaque c/w Psoriasis     See annotation    David medial up thighs with pink tan discoloration.    David f arms with papular rash.    F nails with acrylics on.    Assessment / Plan:        Rash  -     fluocinonide 0.05% (LIDEX) 0.05 % cream; Apply topically 2 (two) times daily. Prn arm rash.Stop using steroid topical when skin is smooth and non itchy.  Do not treat dark or red coloring.  Dispense: 60 g; Refill: 1  Discussed with patient the etiology and pathogenesis of the disease or skin lesion(s) and possible treatments and aggravators.    Reviewed with patient different treatment options and associated risks.  Patient to watch for recurrence or flares or worsening and to call the clinic for a follow up appointment for such.  Watch for sun rash vs contact derm vs ecz.  Poss from [facility] started recently.    Onychodystrophy  -     ammonium lactate 12 % Crea; Bid to bad nails  Dispense: 140 g; Refill: 3  Chronic nature of this condition discussed with patient.  Instructed patient to avoid hot water on the fingernails and toenails and to cut them short.  Can try Amlactin or Urea cream nightly and to watch for skin irritation.  If this occurs, use less often.  Discussed that toenails changes are common after the age of thirty with decreased blood flow and venous back pressure.  Fingernail changes can be spontaneous also with changes later in life.  This may be a chronic condition without much improvement with limited treatments.  Can also use petroleum jelly  regularly.  No more artifical nails or press ons and no more polish.  Also reviewed that fungal infection is unlikely to be the primary issue.  Offered oral antifungal therapy with risks of liver irritation and risks of recurrence of nail changes, especially as primary architectural changes are suspected.  If any nail lifting, patient to keep under nails as dry as possible with blow drying after water exposure.    Recheck with acrylics off.    Patient instructed to start Amlactin cream or lotion nightly to AK prone areas or other specified affected areas.  Warned of skin irritation and to decrease frequency of usage if this occurs.    Encounter for skin care  No hot water bathing reviewed.  Patient instructed in importance in daily sun protection. Sun avoidance and topical protection discussed.     Patient encouraged to wear hat for all outdoor exposure.     Also discussed sun protective clothing.  Recommend sun block with plain zinc oxide or sun block products with some zinc or titanium as their base with an spf of at least 30 to be applied every 2-3 hours of outdoor exposure.  Seek an oil free version if dealing with acne.    Hyperpigmentation  Legs.  No change with bleaching per pt.  No good tx for this.  Discussed with patient the benign nature of these lesions and that no treatment is indicated.  Chronic nature of this condition discussed with patient.  Part of age related skin color changes.           Follow up in about 3 months (around 9/5/2024).

## 2024-06-05 NOTE — PATIENT INSTRUCTIONS
Instructed patient to avoid hot water on the fingernails and toenails and to cut them short.  Can try Amlactin or Urea cream nightly and to watch for skin irritation.  If this occurs, use less often.  Discussed that toenails changes are common after the age of thirty with decreased blood flow and venous back pressure.  Fingernail changes can be spontaneous also with changes later in life.  This may be a chronic condition without much improvement with limited treatments.  Can also use petroleum jelly regularly.  No more artifical nails or press ons and no more polish.  Also reviewed that fungal infection is unlikely to be the primary issue.  Offered oral antifungal therapy with risks of liver irritation and risks of recurrence of nail changes, especially as primary architectural changes are suspected.  If any nail lifting, patient to keep under nails as dry as possible with blow drying after water exposure.        Good skin care regimen discussed including limiting to one bath or shower per day, using lukewarm water with mild soap and moisturization to skin once to twice daily.  Consider glycerin bar soap or Dove.  Consider organic coconut oil.    Patient instructed in importance in daily sun protection. Sun avoidance and topical protection discussed.     Patient encouraged to wear hat for all outdoor exposure.     Also discussed sun protective clothing.      Recommend sun block with plain zinc oxide or sun block products with some zinc or titanium as their base with an spf of at least 30 to be applied every 2-3 hours of outdoor exposure.  Seek an oil free version if dealing with acne.

## 2024-06-26 ENCOUNTER — PATIENT MESSAGE (OUTPATIENT)
Dept: FAMILY MEDICINE | Facility: CLINIC | Age: 44
End: 2024-06-26
Payer: COMMERCIAL

## 2024-06-27 ENCOUNTER — HOSPITAL ENCOUNTER (OUTPATIENT)
Dept: RADIOLOGY | Facility: HOSPITAL | Age: 44
Discharge: HOME | End: 2024-06-27
Attending: INTERNAL MEDICINE
Payer: COMMERCIAL

## 2024-06-27 ENCOUNTER — PATIENT OUTREACH (OUTPATIENT)
Dept: ADMINISTRATIVE | Facility: HOSPITAL | Age: 44
End: 2024-06-27
Payer: COMMERCIAL

## 2024-06-27 DIAGNOSIS — Z12.31 OTHER SCREENING MAMMOGRAM: ICD-10-CM

## 2024-06-27 PROCEDURE — 77067 SCR MAMMO BI INCL CAD: CPT | Mod: TC

## 2024-08-14 ENCOUNTER — PATIENT MESSAGE (OUTPATIENT)
Dept: ADMINISTRATIVE | Facility: HOSPITAL | Age: 44
End: 2024-08-14
Payer: COMMERCIAL

## 2024-08-14 DIAGNOSIS — M25.511 ACUTE PAIN OF RIGHT SHOULDER: ICD-10-CM

## 2024-08-14 DIAGNOSIS — Z00.00 NORMAL PHYSICAL EXAM: ICD-10-CM

## 2024-08-14 DIAGNOSIS — E11.319 TYPE 2 DIABETES MELLITUS WITH LEFT EYE AFFECTED BY RETINOPATHY WITHOUT MACULAR EDEMA, WITHOUT LONG-TERM CURRENT USE OF INSULIN, UNSPECIFIED RETINOPATHY SEVERITY: Primary | ICD-10-CM

## 2024-08-14 DIAGNOSIS — E61.1 IRON DEFICIENCY: ICD-10-CM

## 2024-08-14 RX ORDER — DICLOFENAC SODIUM 10 MG/G
GEL TOPICAL
Qty: 100 G | Refills: 0 | Status: SHIPPED | OUTPATIENT
Start: 2024-08-14

## 2024-08-14 NOTE — TELEPHONE ENCOUNTER
Care Due:                  Date            Visit Type   Department     Provider  --------------------------------------------------------------------------------                                [facility]                              FOLLOWUP/OF  MED/ INTERNAL  Last Visit: 08-      [facility] VISIT   MED/ [facility]      Hernando Duncan  Next Visit: None Scheduled  None         None Found                                                            Last  Test          Frequency    Reason                     Performed    Due Date  --------------------------------------------------------------------------------    Office Visit  12 months..  diclofenac...............  08- 08-    CBC.........  12 months..  diclofenac...............  11-   11-    Cr..........  12 months..  diclofenac...............  11- 11-    [employer] Embedded Care Due Messages. Reference number: [id number].   8/14/2024 4:24:37 PM CDT

## 2024-08-15 DIAGNOSIS — Z12.11 SCREENING FOR COLON CANCER: ICD-10-CM

## 2024-10-16 ENCOUNTER — PATIENT OUTREACH (OUTPATIENT)
Dept: ADMINISTRATIVE | Facility: HOSPITAL | Age: 44
End: 2024-10-16
Payer: COMMERCIAL

## 2024-10-31 ENCOUNTER — PATIENT MESSAGE (OUTPATIENT)
Dept: DERMATOLOGY | Facility: CLINIC | Age: 44
End: 2024-10-31
Payer: COMMERCIAL

## 2024-11-05 ENCOUNTER — OFFICE VISIT (OUTPATIENT)
Dept: DERMATOLOGY | Facility: CLINIC | Age: 44
End: 2024-11-05
Payer: COMMERCIAL

## 2024-11-05 DIAGNOSIS — L56.8 PHOTOSENSITIVITY DERMATITIS: Primary | ICD-10-CM

## 2024-11-05 DIAGNOSIS — L81.0 POSTINFLAMMATORY HYPERPIGMENTATION: ICD-10-CM

## 2024-11-05 PROCEDURE — 99213 OFFICE O/P EST LOW 20 MIN: CPT | Mod: 95,,, | Performed by: DERMATOLOGY

## 2024-11-05 NOTE — PROGRESS NOTES
The patient location is: home  The chief complaint leading to consultation is: rash  Visit type: virtual visit with synchronous audio and video  Total time spent with patient: 9 minutes  Each patient to whom I provide medical services by telemedicine is:  (1) informed of the relationship between the physician and patient and the respective role of any other health care provider with respect to management of the patient; and (2) notified that he or she may decline to receive medical services by telemedicine and may withdraw from such care at any time.      Patient Information  Name: Tonya Rivas  : 1980  MRN: [id number]     Referring Physician:  Referring   Primary Care Physician:  Hernando Duncan MD   Date of Visit: 2024      Subjective:     History of Present lllness:    Tonya Rivas is a 44 y.o. female who presents with a chief complaint of rash.  Location: right arm, left arm  Duration: 6 months  Signs/Symptoms: burning, cracking, crusting, dryness, itching, redness. Moderate. It is always present but gets better and worse.   Exacerbating factors: none  Relieving factors/Prior treatments: no relief from OTC anti itch cream or Rx topical steroids (lidex cream); uses dove soap, all free&clear, Eucerin, avoiding all fragrance, Eucerin    Patient was last seen: 2023.  Prior notes by myself reviewed.   Clinical documentation obtained by nursing staff reviewed.    Review of Systems    Objective:   Physical Exam     Diagram Legend     Erythematous scaling macule/papule c/w actinic keratosis       Vascular papule c/w angioma      Pigmented verrucoid papule/plaque c/w seborrheic keratosis      Yellow umbilicated papule c/w sebaceous hyperplasia      Irregularly shaped tan macule c/w lentigo     1-2 mm smooth white papules consistent with Milia      Movable subcutaneous cyst with punctum c/w epidermal inclusion cyst      Subcutaneous movable cyst c/w pilar cyst      Firm pink to brown papule  c/w dermatofibroma      Pedunculated fleshy papule(s) c/w skin tag(s)      Evenly pigmented macule c/w junctional nevus     Mildly variegated pigmented, slightly irregular-bordered macule c/w mildly atypical nevus      Flesh colored to evenly pigmented papule c/w intradermal nevus       Pink pearly papule/plaque c/w basal cell carcinoma      Erythematous hyperkeratotic cursted plaque c/w SCC      Surgical scar with no sign of skin cancer recurrence      Open and closed comedones      Inflammatory papules and pustules      Verrucoid papule consistent consistent with wart     Erythematous eczematous patches and plaques     Dystrophic onycholytic nail with subungual debris c/w onychomycosis     Umbilicated papule    Erythematous-base heme-crusted tan verrucoid plaque consistent with inflamed seborrheic keratosis     Erythematous Silvery Scaling Plaque c/w Psoriasis     See annotation            [] Data reviewed  [] Prior external notes reviewed  [] Independent review of test  [] Management discussed with another provider  [] Independent historian    Assessment / Plan:        Photosensitivity dermatitis  Possibly NSAID-induced. Rec'd avoiding all NSAIDs.  Okay to use Rx fluocinonide cream prn flares.   Recommend using a broad-spectrum, water-resistant sunscreen with SPF of 30 or higher--reapply every 2 hours. Seek shade and wear sun-protective clothing/hat.    Postinflammatory hyperpigmentation  This is a normal discoloration of the skin after an inflammatory process has resolved. It may take months to years to fade.  Recommend using a broad-spectrum, water-resistant sunscreen with SPF of 30 or higher--reapply every 2 hours. Seek shade and wear sun-protective clothing/hat.      Follow up if symptoms worsen or fail to improve.      Obdulia Her MD, FAAD  [facility]

## 2024-11-13 ENCOUNTER — PATIENT MESSAGE (OUTPATIENT)
Dept: ADMINISTRATIVE | Facility: HOSPITAL | Age: 44
End: 2024-11-13
Payer: COMMERCIAL

## 2025-01-05 ENCOUNTER — HOSPITAL ENCOUNTER (EMERGENCY)
Facility: HOSPITAL | Age: 45
Discharge: HOME | End: 2025-01-05
Attending: EMERGENCY MEDICINE
Payer: COMMERCIAL

## 2025-01-05 VITALS
WEIGHT: 190 LBS | SYSTOLIC BLOOD PRESSURE: 144 MMHG | TEMPERATURE: 99 F | DIASTOLIC BLOOD PRESSURE: 99 MMHG | RESPIRATION RATE: 20 BRPM | HEIGHT: 68 IN | OXYGEN SATURATION: 98 % | BODY MASS INDEX: 28.79 KG/M2 | HEART RATE: 94 BPM

## 2025-01-05 DIAGNOSIS — S60.458A: Primary | ICD-10-CM

## 2025-01-05 PROCEDURE — 99283 EMERGENCY DEPT VISIT LOW MDM: CPT | Mod: 25,ER

## 2025-01-05 NOTE — ED PROVIDER NOTES
"Encounter Date: 2025       History     Chief Complaint   Patient presents with    Finger Pain     Left 3rd finger tip pain x 1 month. Reports injury with glass in finger one month ago. States she removed most pieces but believes a piece is still in there.      Patient is a 44-year-old woman presenting to the emergency department for evaluation of a possible foreign body in her left 3rd digit of her hand.  Patient states that on , approximately 1 month ago, she pulled a bath and body works candle out of the box, not realizing that the candle was broken.  She states that she got glass in her finger.  She was able to remove the majority of this glass however she feels there may still be a small piece embedded in the fingertip.  She states that at times when she presses on items it is painful.  She denies any purulent drainage from the area, redness to the finger, or any other concerns.      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     Diabetes mellitus type II     uncontrolled    Herpes simplex virus (HSV) infection     "cold sores"    History of  delivery, currently pregnant x2     Kidney stones     Obesity 2013     Past Surgical History:   Procedure Laterality Date    ABDOMINOPLASTY N/A 2021    Procedure: ABDOMINOPLASTY;  Surgeon: Víctor Weston MD;  Location: [facility] OR;  Service: Plastics;  Laterality: N/A;  with routine liposuction     SECTION      x3    DILATION AND CURETTAGE OF UTERUS USING SUCTION N/A 2019    Procedure: DILATION AND CURETTAGE, UTERUS, USING SUCTION;  Surgeon: Tonya Cavanaugh MD;  Location: [facility] OR;  Service: OB/GYN;  Laterality: N/A;    EXTRACORPOREAL SHOCK WAVE LITHOTRIPSY      LAPAROSCOPIC GASTRIC " BANDING  2005    LIPOSUCTION OF ABDOMEN N/A 2/11/2021    Procedure: LIPOSUCTION, ABDOMEN;  Surgeon: Víctor Weston MD;  Location: [facility] OR;  Service: Plastics;  Laterality: N/A;     Family History   Problem Relation Name Age of Onset    Cancer Mother      Learning disabilities Son      Heart disease Maternal Grandfather      Breast cancer Maternal Grandmother      Colon cancer Neg Hx      Ovarian cancer Neg Hx       Social History     Tobacco Use    Smoking status: Never    Smokeless tobacco: Never   Substance Use Topics    Alcohol use: No     Comment: social    Drug use: No     Review of Systems   Constitutional:  Negative for fever.   Musculoskeletal:  Negative for joint swelling.        Left third finger pain    Skin:  Positive for wound (glass in finger). Negative for color change.       Physical Exam     Initial Vitals [01/05/25 1208]   BP Pulse Resp Temp SpO2   (!) 144/99 94 20 98.6 °F (37 °C) 98 %      MAP       --         Physical Exam    Nursing note and vitals reviewed.  Constitutional: She appears well-developed and well-nourished. No distress.   HENT:   Head: Normocephalic.   Eyes: EOM are normal.   Cardiovascular:  Normal rate.           Pulmonary/Chest: No respiratory distress.   Abdominal: She exhibits no distension.     Skin:   Left third digit c/o punctate small puncture wound w/o drainage or bleeding. +mild ttp.          ED Course   Procedures  Labs Reviewed - No data to display       Imaging Results              X-Ray Finger 2 or More Views Left (Final result)  Result time 01/05/25 13:08:06      Final result by July Silver MD (01/05/25 13:08:06)                   Impression:      As above.      Electronically signed by: July Silver MD  Date:    01/05/2025  Time:    13:08               Narrative:    EXAMINATION:  XR FINGER 2 OR MORE VIEWS LEFT    CLINICAL HISTORY:  possible foreign body left third digit distal tip in pad of finger;    TECHNIQUE:  Three views of the left 3rd  digit    COMPARISON:  None.    FINDINGS:  There is a 5.5 mm radiopaque foreign body within the volar soft tissues of the volar aspect of the 3rd digit at the level of the distal interphalangeal joint.  No fracture or dislocation is seen.                                       Medications - No data to display  Medical Decision Making  This is an urgent evaluation of a 44-year-old woman presenting to the emergency department today for evaluation of left finger pain.  Differential diagnoses include retained foreign body, laceration, amongst others.  On physical examination, patient in no acute distress.  Examination of the finger revealed a punctate small puncture wound without bleeding, erythema, or warmth.  No obvious foreign body palpated.  X-ray has been ordered at this time.    Nicci Miranda MD  12:23 PM  1/5/2025       Patient's x-ray is consistent with a foreign body.  Given this has been present for almost a month, I see no emergent indication for removal at this time however I will place referrals to both General surgery and hand surgery for removal of this foreign object.  Patient is amenable to waiting for this.  She has been given return precautions and all questions and concerns have been addressed.    Nicci Miranda MD  2:25 PM  1/5/2025       Amount and/or Complexity of Data Reviewed  Radiology: ordered.                                      Clinical Impression:  Final diagnoses:  [R16.243H] Foreign body in skin of middle finger (Primary)                 Nicci Miranda MD  01/05/25 1426       Nicci Miranda MD  01/05/25 1432       Nicci Miranda MD  01/05/25 1433

## 2025-01-05 NOTE — ED NOTES
Left 3rd finger tip pain x 1 month. Reports injury with glass in finger one month ago. States she removed most pieces but believes a piece is still in there   Pt [id number]

## 2025-01-16 ENCOUNTER — OFFICE VISIT (OUTPATIENT)
Dept: SURGERY | Facility: CLINIC | Age: 45
End: 2025-01-16
Payer: COMMERCIAL

## 2025-01-16 DIAGNOSIS — S60.458A: ICD-10-CM

## 2025-01-16 PROCEDURE — 99499 UNLISTED E&M SERVICE: CPT | Mod: S$GLB,,, | Performed by: SURGERY

## 2025-04-16 NOTE — TELEPHONE ENCOUNTER
Verbal consent was acquired by the patient to use [employer] ambient listening note generation during this visit     Chief Complaint   Patient presents with    Establish Care     Bp medication/weight loss.       Subjective:     HPI:   History of Present Illness  The patient presents to [facility] and for evaluation of weight loss, blood pressure management, and anxiety.    She has been off lisinopril for approximately 6 months and reports overall good health. However, she experiences intermittent episodes of tachycardia, which she is uncertain if it is related to her blood pressure. She has no history of abnormal heart rhythms.    She has lost 20 pounds but has since regained 10 pounds. She expresses interest in trying Zepbound for weight management, as her sister-in-law has had positive results with this medication. She has not had any recent blood work done. She also reports mild fatigue. She previously used phentermine 11 years ago following the birth of her last child, which she found beneficial.    She acknowledges experiencing anxiety.    Her last consultation with her primary care physician, Dr. Pfeiffer, was approximately a year ago. Her last Pap smear was conducted 2 to 3 years ago, and she does not currently have an OB/GYN. She has undergone 3 C-sections and has 5 children. She quit smoking 3.5 years ago and currently uses nicotine vapes. She also uses marijuana.    SOCIAL HISTORY  She vapes nicotine. She quit smoking 3.5 years ago. She uses marijuana.    FAMILY HISTORY  Her mother had heart disease. Both parents passed away from lung cancer and were heavy smokers.    MEDICATIONS  Discontinued: lisinopril        No problems updated.    ROS  See HPI     No Known Allergies    Current medicines (including changes today)  Current Outpatient Medications   Medication Sig Dispense Refill    phentermine (ADIPEX-P) 37.5 MG tablet Take 1 Tablet by mouth every morning before breakfast for 30 days. 30 Tablet 0  rx sent to [facility] to see if they can assist with formulary coverage.  Pt notified via my [facility].   "    No current facility-administered medications for this visit.       Social History     Tobacco Use    Smoking status: Former     Current packs/day: 0.25     Types: Cigarettes    Smokeless tobacco: Never    Tobacco comments:     quit 01/2022   Vaping Use    Vaping status: Every Day    Substances: Nicotine    Devices: Disposable   Substance Use Topics    Alcohol use: Not Currently     Comment: social     Drug use: Yes     Types: Marijuana     Comment: daily.       Patient Active Problem List    Diagnosis Date Noted    Acute cystitis without hematuria 11/07/2022    Other constipation 11/07/2022    Class 1 obesity due to excess calories without serious comorbidity with body mass index (BMI) of 34.0 to 34.9 in adult 01/11/2021    Essential hypertension 01/11/2021    Bipolar depression (HCC) 01/11/2021       Family History   Problem Relation Age of Onset    Lung Disease Mother     Cancer Mother     Heart Disease Mother     Lung Disease Father     Cancer Father           Objective:     /62 (BP Location: Left arm, Patient Position: Sitting, BP Cuff Size: Adult)   Pulse 68   Temp 36.7 °C (98 °F) (Temporal)   Resp 16   Ht 1.575 m (5' 2\")   Wt 80.8 kg (178 lb 1.6 oz)   SpO2 100%  Body mass index is 32.57 kg/m².    Physical Exam:  Physical Exam  Vitals reviewed.   Constitutional:       General: She is awake.      Appearance: Normal appearance. She is well-developed.   HENT:      Head: Normocephalic.   Eyes:      Conjunctiva/sclera: Conjunctivae normal.   Cardiovascular:      Rate and Rhythm: Normal rate.   Pulmonary:      Effort: Pulmonary effort is normal. No respiratory distress.   Musculoskeletal:      Cervical back: Neck supple.   Skin:     General: Skin is warm and dry.   Neurological:      Mental Status: She is alert and oriented to person, place, and time.   Psychiatric:         Mood and Affect: Mood normal.         Behavior: Behavior normal. Behavior is cooperative.              Assessment and Plan: "     The following treatment plan was discussed:    Problem List Items Addressed This Visit    None  Visit Diagnoses         Class 1 obesity due to excess calories with body mass index (BMI) of 32.0 to 32.9 in adult, unspecified whether serious comorbidity present        Relevant Medications    phentermine (ADIPEX-P) 37.5 MG tablet    Other Relevant Orders    HEMOGLOBIN A1C    Lipid Profile    VITAMIN D,25 HYDROXY (DEFICIENCY)    TSH    FREE THYROXINE    Comp Metabolic Panel    CBC WITHOUT DIFFERENTIAL    FOLATE    FERRITIN    VITAMIN B12    Controlled Substance Treatment Agreement    PAIN MANAGEMENT SCRN, W/ RFLX TO QNT (Completed)    EKG - Clinic Performed      Screening for cardiovascular condition        Relevant Orders    Lipid Profile    VITAMIN D,25 HYDROXY (DEFICIENCY)    TSH    FREE THYROXINE    Comp Metabolic Panel    CBC WITHOUT DIFFERENTIAL    HIV AG/AB COMBO ASSAY SCREENING      Need for hepatitis C screening test        Relevant Orders    HEP C VIRUS ANTIBODY      Routine screening for STI (sexually transmitted infection)          Encounter for screening mammogram for malignant neoplasm of breast        Relevant Orders    MA-SCREENING MAMMO BILAT W/TOMOSYNTHESIS W/CAD            Assessment & Plan  1. Blood pressure management.  Her blood pressure readings are within the normal range, albeit on the lower end. The episodes of tachycardia she experiences could be attributed to anxiety or potential rhythm disturbances. It was explained that caffeine consumption can also contribute to these symptoms. She will discontinue the use of lisinopril and monitor her condition without it. An EKG will be performed today for further evaluation.    2. Weight loss.  She has expressed interest in resuming phentermine treatment, which she found effective in the past. It was discussed that GLP-1 medications like Zepbound are not covered by Medicaid unless she has diabetes and are expensive out of pocket. Phentermine can  cause palpitations as it is a stimulant, so if she experiences this side effect, the dosage will be reduced. A prescription for phentermine will be sent to [facility] on [address]. She is advised to take it first thing in the morning or wait 2 hours after eating if she forgets. If palpitations occur, she should cut the tablet in half. A urine drug screen will be conducted today, and she will sign a controlled substance agreement. She is also advised to cease marijuana use to comply with controlled substance policy through [facility]. Anticipate that UDS will show marijuana use at this time.     3. Anxiety.  She reports experiencing anxiety, which may be contributing to her palpitations. No specific treatment plan for anxiety was discussed during this visit.    4. Health maintenance.  A mammogram has been ordered for her to schedule at her convenience. A Pap smear will be conducted during her next appointment. Fasting labs have been ordered to rule out any underlying conditions that may be hindering her weight loss efforts.    Follow-up  The patient will follow up in 1 month.    PROCEDURE  The patient has undergone 3 C-sections in the past.    Any change or worsening of signs or symptoms, patient encouraged to follow-up or report to emergency room for further evaluation. Patient verbalizes understanding and agrees.      PLEASE NOTE: This dictation was created using voice recognition software. I have made every reasonable attempt to correct obvious errors, but I expect that there are errors of grammar and possibly content that I did not discover before finalizing the note.

## 2025-04-30 PROBLEM — Z90.3 INTESTINAL MALABSORPTION FOLLOWING GASTRECTOMY: Status: ACTIVE | Noted: 2025-04-30

## 2025-04-30 PROBLEM — K91.2 INTESTINAL MALABSORPTION FOLLOWING GASTRECTOMY: Status: ACTIVE | Noted: 2025-04-30

## 2025-04-30 NOTE — ASSESSMENT & PLAN NOTE
05/01/2025: Reports that she got a B12 injection with endocrinology.  Will try to get copy of outside labs.  No recent iron levels and will check that with next labs through [facility].  Discussed that if below 50 would start supplementing for goal of   Orders:    Ferritin; Future

## 2025-04-30 NOTE — ASSESSMENT & PLAN NOTE
05/01/2025: Followed by outside endocrinology.  Relatively recently, glimepiride was stopped and her Mounjaro was increased.  On pravastatin.  Labs through endocrinology.  She needs lancets and I have sent that in.  Orders:    lancets (LANCETS,THIN) Misc; 1 to 3 times a day glucose testing; whichever brand covered by insurance

## 2025-04-30 NOTE — ASSESSMENT & PLAN NOTE
05/01/2025: Reports that she got a B12 injection with endocrinology.  Will try to get copy of outside labs.  No recent iron levels and will check that with next labs through [facility].  Discussed that if below 50 would start supplementing for goal of   Orders:    Ferritin; Future
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
## 2025-04-30 NOTE — ASSESSMENT & PLAN NOTE
05/01/2025: Remote history of sleep apnea prior to gastric banding/weight loss, and she could not tolerate the CPAP then.  She does note heroic snoring and would be interested in revisiting this.  She would be interested in hypoglossal nerve stimulation if confirms that she still has sleep apnea.    Check HST and if present refer to ENT  Orders:    Home Sleep Study; Future

## 2025-04-30 NOTE — ASSESSMENT & PLAN NOTE
05/01/2025: Reports that she got a B12 injection with endocrinology.  Will try to get copy of outside labs.  No recent iron levels and will check that with next labs through [facility].  Discussed that if below 50 would start supplementing for goal of   Orders:    Ferritin; Future

## 2025-05-01 ENCOUNTER — PATIENT OUTREACH (OUTPATIENT)
Dept: ADMINISTRATIVE | Facility: HOSPITAL | Age: 45
End: 2025-05-01
Payer: COMMERCIAL

## 2025-05-01 ENCOUNTER — OFFICE VISIT (OUTPATIENT)
Dept: FAMILY MEDICINE | Facility: CLINIC | Age: 45
End: 2025-05-01
Payer: COMMERCIAL

## 2025-05-01 VITALS
TEMPERATURE: 98 F | SYSTOLIC BLOOD PRESSURE: 118 MMHG | OXYGEN SATURATION: 97 % | HEIGHT: 68 IN | HEART RATE: 99 BPM | WEIGHT: 195.56 LBS | BODY MASS INDEX: 29.64 KG/M2 | DIASTOLIC BLOOD PRESSURE: 74 MMHG

## 2025-05-01 DIAGNOSIS — R21 RASH: ICD-10-CM

## 2025-05-01 DIAGNOSIS — K91.2 INTESTINAL MALABSORPTION FOLLOWING GASTRECTOMY: ICD-10-CM

## 2025-05-01 DIAGNOSIS — M79.645 PAIN OF FINGER OF LEFT HAND: ICD-10-CM

## 2025-05-01 DIAGNOSIS — Z00.00 NORMAL PHYSICAL EXAM: Primary | ICD-10-CM

## 2025-05-01 DIAGNOSIS — Z98.84 STATUS POST GASTRIC BANDING: ICD-10-CM

## 2025-05-01 DIAGNOSIS — E61.1 IRON DEFICIENCY: ICD-10-CM

## 2025-05-01 DIAGNOSIS — E78.5 DYSLIPIDEMIA: ICD-10-CM

## 2025-05-01 DIAGNOSIS — G47.33 OBSTRUCTIVE SLEEP APNEA: ICD-10-CM

## 2025-05-01 DIAGNOSIS — Z90.3 INTESTINAL MALABSORPTION FOLLOWING GASTRECTOMY: ICD-10-CM

## 2025-05-01 DIAGNOSIS — Z12.11 SCREENING FOR COLORECTAL CANCER: ICD-10-CM

## 2025-05-01 DIAGNOSIS — E11.319 TYPE 2 DIABETES MELLITUS WITH LEFT EYE AFFECTED BY RETINOPATHY WITHOUT MACULAR EDEMA, WITHOUT LONG-TERM CURRENT USE OF INSULIN, UNSPECIFIED RETINOPATHY SEVERITY: ICD-10-CM

## 2025-05-01 DIAGNOSIS — Z12.12 SCREENING FOR COLORECTAL CANCER: ICD-10-CM

## 2025-05-01 PROCEDURE — 99396 PREV VISIT EST AGE 40-64: CPT | Mod: S$GLB,,, | Performed by: INTERNAL MEDICINE

## 2025-05-01 PROCEDURE — 99999 PR PBB SHADOW E&M-EST. PATIENT-LVL V: CPT | Mod: PBBFAC,,, | Performed by: INTERNAL MEDICINE

## 2025-05-01 RX ORDER — HYDROCODONE BITARTRATE AND ACETAMINOPHEN 7.5; 325 MG/1; MG/1
1 TABLET ORAL EVERY 6 HOURS PRN
COMMUNITY
Start: 2025-02-05 | End: 2025-05-01 | Stop reason: ALTCHOICE

## 2025-05-01 RX ORDER — TIRZEPATIDE 15 MG/.5ML
INJECTION, SOLUTION SUBCUTANEOUS
COMMUNITY

## 2025-05-01 RX ORDER — GABAPENTIN 100 MG/1
100 CAPSULE ORAL NIGHTLY
Qty: 30 CAPSULE | Refills: 0 | Status: SHIPPED | OUTPATIENT
Start: 2025-05-01 | End: 2026-05-01

## 2025-05-01 RX ORDER — CELECOXIB 200 MG/1
200 CAPSULE ORAL 2 TIMES DAILY PRN
COMMUNITY
Start: 2025-04-17 | End: 2025-05-01 | Stop reason: SINTOL

## 2025-05-01 RX ORDER — ERGOCALCIFEROL 1.25 MG/1
CAPSULE ORAL
COMMUNITY
Start: 2025-04-07

## 2025-05-01 RX ORDER — LANCETS
EACH MISCELLANEOUS
Qty: 300 EACH | Refills: 11 | Status: SHIPPED | OUTPATIENT
Start: 2025-05-01

## 2025-05-01 RX ORDER — PRAVASTATIN SODIUM 20 MG/1
20 TABLET ORAL DAILY
COMMUNITY
Start: 2025-04-04

## 2025-05-01 NOTE — PATIENT INSTRUCTIONS
Please call the sleep laboratory at [phone] to discuss arranging your sleep study.       If you have not been contacted in a week about your referral, please call my Referrals Coordinator at [phone]

## 2025-05-05 ENCOUNTER — PATIENT MESSAGE (OUTPATIENT)
Dept: ADMINISTRATIVE | Facility: HOSPITAL | Age: 45
End: 2025-05-05
Payer: COMMERCIAL

## 2025-05-05 DIAGNOSIS — E11.9 TYPE 2 DIABETES MELLITUS WITHOUT COMPLICATION, UNSPECIFIED WHETHER LONG TERM INSULIN USE: ICD-10-CM

## 2025-05-30 ENCOUNTER — PATIENT OUTREACH (OUTPATIENT)
Dept: ADMINISTRATIVE | Facility: HOSPITAL | Age: 45
End: 2025-05-30
Payer: COMMERCIAL

## 2025-05-31 DIAGNOSIS — M79.645 PAIN OF FINGER OF LEFT HAND: ICD-10-CM

## 2025-06-01 RX ORDER — GABAPENTIN 100 MG/1
100 CAPSULE ORAL NIGHTLY
Qty: 30 CAPSULE | Refills: 2 | Status: SHIPPED | OUTPATIENT
Start: 2025-06-01

## 2025-06-03 ENCOUNTER — TELEPHONE (OUTPATIENT)
Dept: PULMONOLOGY | Facility: HOSPITAL | Age: 45
End: 2025-06-03
Payer: COMMERCIAL

## 2025-06-03 ENCOUNTER — RESULTS FOLLOW-UP (OUTPATIENT)
Dept: FAMILY MEDICINE | Facility: CLINIC | Age: 45
End: 2025-06-03
Payer: COMMERCIAL

## 2025-06-03 DIAGNOSIS — K91.2 INTESTINAL MALABSORPTION FOLLOWING GASTRECTOMY: Primary | ICD-10-CM

## 2025-06-03 DIAGNOSIS — Z90.3 INTESTINAL MALABSORPTION FOLLOWING GASTRECTOMY: Primary | ICD-10-CM

## 2025-06-03 DIAGNOSIS — E61.1 IRON DEFICIENCY: ICD-10-CM

## 2025-06-03 RX ORDER — FERROUS SULFATE 325(65) MG
325 TABLET ORAL EVERY OTHER DAY
Qty: 90 TABLET | Refills: 0 | Status: SHIPPED | OUTPATIENT
Start: 2025-06-03

## 2025-06-04 DIAGNOSIS — E11.9 TYPE 2 DIABETES MELLITUS WITHOUT COMPLICATION: ICD-10-CM

## 2025-06-09 ENCOUNTER — TELEPHONE (OUTPATIENT)
Dept: SLEEP MEDICINE | Facility: HOSPITAL | Age: 45
End: 2025-06-09
Payer: COMMERCIAL

## 2025-06-09 ENCOUNTER — PATIENT MESSAGE (OUTPATIENT)
Dept: ADMINISTRATIVE | Facility: HOSPITAL | Age: 45
End: 2025-06-09
Payer: COMMERCIAL

## 2025-06-18 ENCOUNTER — HOSPITAL ENCOUNTER (OUTPATIENT)
Dept: SLEEP MEDICINE | Facility: HOSPITAL | Age: 45
Discharge: HOME | End: 2025-06-18
Attending: INTERNAL MEDICINE
Payer: COMMERCIAL

## 2025-06-18 DIAGNOSIS — G47.33 OBSTRUCTIVE SLEEP APNEA: ICD-10-CM

## 2025-06-18 PROCEDURE — 95800 SLP STDY UNATTENDED: CPT

## 2025-06-18 NOTE — PROGRESS NOTES
The patient ID was verified. She was instructed on how to turn the Home Sleep Testing device on and off, how to apply the sensors. She was encouraged to sleep on supine position and must have 6 hours of sleep. The patient was instructed not to get the device wet and return it back to us. All questions were answered prior to patient leaving. He  was provided the after visit summary.

## 2025-06-18 NOTE — PATIENT INSTRUCTIONS
Your sleep study will be scored and interpreted by one of our physicians who are board certified in sleep medicine.  Within two weeks the results will be sent to the physician who referred you. Your physician should then contact you to go over the results, along with any recommendations. If you do not hear from your physician within two weeks, please call them.   
fever

## 2025-06-27 PROBLEM — Z86.69 HISTORY OF OBSTRUCTIVE SLEEP APNEA: Status: ACTIVE | Noted: 2019-07-02

## 2025-07-02 ENCOUNTER — PATIENT MESSAGE (OUTPATIENT)
Dept: DERMATOLOGY | Facility: CLINIC | Age: 45
End: 2025-07-02
Payer: COMMERCIAL

## 2025-07-09 DIAGNOSIS — Z12.31 OTHER SCREENING MAMMOGRAM: ICD-10-CM

## 2025-07-14 ENCOUNTER — PATIENT MESSAGE (OUTPATIENT)
Dept: ADMINISTRATIVE | Facility: HOSPITAL | Age: 45
End: 2025-07-14
Payer: COMMERCIAL

## 2025-07-22 ENCOUNTER — PATIENT MESSAGE (OUTPATIENT)
Dept: DERMATOLOGY | Facility: CLINIC | Age: 45
End: 2025-07-22
Payer: COMMERCIAL

## 2025-07-29 ENCOUNTER — PATIENT MESSAGE (OUTPATIENT)
Dept: FAMILY MEDICINE | Facility: CLINIC | Age: 45
End: 2025-07-29
Payer: COMMERCIAL

## 2025-07-29 DIAGNOSIS — E11.319 TYPE 2 DIABETES MELLITUS WITH LEFT EYE AFFECTED BY RETINOPATHY WITHOUT MACULAR EDEMA, WITHOUT LONG-TERM CURRENT USE OF INSULIN, UNSPECIFIED RETINOPATHY SEVERITY: Primary | ICD-10-CM

## 2025-07-29 RX ORDER — LANCETS
EACH MISCELLANEOUS
Qty: 200 EACH | Refills: 11 | Status: SHIPPED | OUTPATIENT
Start: 2025-07-29

## 2025-07-29 RX ORDER — INSULIN PUMP SYRINGE, 3 ML
EACH MISCELLANEOUS
Qty: 1 EACH | Refills: 0 | Status: SHIPPED | OUTPATIENT
Start: 2025-07-29 | End: 2026-07-29

## 2025-08-06 ENCOUNTER — TELEPHONE (OUTPATIENT)
Dept: DERMATOLOGY | Facility: CLINIC | Age: 45
End: 2025-08-06
Payer: COMMERCIAL

## 2025-08-06 ENCOUNTER — OFFICE VISIT (OUTPATIENT)
Dept: DERMATOLOGY | Facility: CLINIC | Age: 45
End: 2025-08-06
Payer: COMMERCIAL

## 2025-08-06 DIAGNOSIS — L74.519 HYPERHIDROSIS, FOCAL, PRIMARY: ICD-10-CM

## 2025-08-06 DIAGNOSIS — L30.9 DERMATITIS: ICD-10-CM

## 2025-08-06 DIAGNOSIS — D48.5 NEOPLASM OF UNCERTAIN BEHAVIOR OF SKIN: Primary | ICD-10-CM

## 2025-08-06 PROCEDURE — 99214 OFFICE O/P EST MOD 30 MIN: CPT | Mod: 25,S$GLB,, | Performed by: DERMATOLOGY

## 2025-08-06 PROCEDURE — 11104 PUNCH BX SKIN SINGLE LESION: CPT | Mod: S$GLB,,, | Performed by: DERMATOLOGY

## 2025-08-06 PROCEDURE — 88305 TISSUE EXAM BY PATHOLOGIST: CPT | Mod: TC | Performed by: DERMATOLOGY

## 2025-08-06 NOTE — PATIENT INSTRUCTIONS
Biopsy Wound Care Instructions    Leave the bandage on for 24 hours without getting it wet.   Clean the area once a day with a gentle soap and water, then pat dry and apply Vaseline and a bandaid.  The site should be kept moist with Vaseline at all times to improve healing. Reapply a thick coating as needed. Do not let the site air out or form a scab, as this will delay healing and worsen scarring.  If any bleeding or oozing occurs once you return home, apply firm pressure to the area for 30 minutes straight without peeking. If bleeding continues, call the office immediately.  Please message us via [facility], call us at [phone], or return to the office at any sign of increasing redness, swelling, tenderness, pain, heat, yellow drainage/discharge, or continued bleeding.      Receiving Your Pathology Results    Your pathology results will be released to you on [facility] at the same time that Dr. Her receives them.   Dr. Her will then message you with her interpretation of the results and/or with the plan going forward.  If you do not use [facility] or if your pathology results require more of an explanation, you will receive your results via a phone call.  If 2 weeks go by and you have not received your results, please message us via [facility] or call us at [phone] to inform us.

## 2025-08-06 NOTE — TELEPHONE ENCOUNTER
----- Message from Nurse Adams sent at 8/6/2025 11:26 AM CDT -----  Good morning, pt is needing to be scheduled for hair loss when you get time

## 2025-08-06 NOTE — PROGRESS NOTES
Patient Information  Name: Tonya Rivas  : 1980  MRN: [id number]     Referring Physician:  No ref. provider found   Primary Care Physician:  Hernando Duncan MD   Date of Visit: 25      Subjective:     History of Present lllness:    Tonya Rivas is a 45 y.o. female who presents with a chief complaint of itching rash, excessive sweating, and discoloration.    Location: arms, shoulders, chest and back  Signs/Symptoms: itching and sometimes red bumps  Symptom course: unchanged, comes and goes   Current treatment: fluocinonide    Location: posterior thighs  Duration: years  Signs/Symptoms: excessive sweating  Relieving factors/Prior treatments: Drysol, oral anticholinergics (ditropan) and rx antiperspirant (none have really helped)     Location: behind both ears  Signs/Symptoms: discoloration of skin  Current treatment: none    Patient was last seen: 2024.  Prior notes by myself reviewed.   Clinical documentation obtained by nursing staff reviewed.    Review of Systems    Objective:   Physical Exam   Constitutional: She appears well-developed and well-nourished. No distress.   Neurological: She is alert and oriented to person, place, and time. She is not disoriented.   Psychiatric: She has a normal mood and affect.   Skin:   Areas Examined (abnormalities noted in diagram):   Scalp / Hair Palpated and Inspected  Head / Face Inspection Performed  Neck Inspection Performed  Chest / Axilla Inspection Performed  Abdomen Inspection Performed  Back Inspection Performed  RUE Inspected  LUE Inspection Performed                 Diagram Legend     Erythematous scaling macule/papule c/w actinic keratosis       Vascular papule c/w angioma      Pigmented verrucoid papule/plaque c/w seborrheic keratosis      Yellow umbilicated papule c/w sebaceous hyperplasia      Irregularly shaped tan macule c/w lentigo     1-2 mm smooth white papules consistent with Milia      Movable subcutaneous cyst with punctum  c/w epidermal inclusion cyst      Subcutaneous movable cyst c/w pilar cyst      Firm pink to brown papule c/w dermatofibroma      Pedunculated fleshy papule(s) c/w skin tag(s)      Evenly pigmented macule c/w junctional nevus     Mildly variegated pigmented, slightly irregular-bordered macule c/w mildly atypical nevus      Flesh colored to evenly pigmented papule c/w intradermal nevus       Pink pearly papule/plaque c/w basal cell carcinoma      Erythematous hyperkeratotic cursted plaque c/w SCC      Surgical scar with no sign of skin cancer recurrence      Open and closed comedones      Inflammatory papules and pustules      Verrucoid papule consistent consistent with wart     Erythematous eczematous patches and plaques     Dystrophic onycholytic nail with subungual debris c/w onychomycosis     Umbilicated papule    Erythematous-base heme-crusted tan verrucoid plaque consistent with inflamed seborrheic keratosis     Erythematous Silvery Scaling Plaque c/w Psoriasis     See annotation              [] Data reviewed  [] Prior external notes reviewed  [] Independent review of test  [] Management discussed with another provider  [] Independent historian    Assessment / Plan:      Pathology Orders:       Normal Orders This Visit    Specimen to Pathology, Dermatology     Questions:    Procedure Type: Dermatology and skin neoplasms    Number of Specimens: 1    ------------------------: -------------------------    Spec 1 Procedure: Punch Biopsy    Spec 1 Clinical Impression: r/o LP pigmentosus vs other lichenoid dermatitis    Spec 1 Source: right forearm    Clinical Information: see EPIC    Clinical History: see EPIC    Specimen Source: Skin    Release to patient: Immediate    Send normal result to authorizing provider's In Basket if patient is active on MyChart: Yes          Neoplasm of uncertain behavior of skin  -     Specimen to Pathology, Dermatology  Punch biopsy procedure note:  Risk, benefits, and alternatives are  discussed with the patient, including risk of infection, scar, recurrence, and need for additional treatment of site. The patient agrees to the procedure by verbal consent. The area is marked and prepped with alcohol.  Approximately 1 mL of lidocaine 1% with epinephrine is used for local anesthesia. A 4 mm punch is used to remove part or all of the lesion. The specimen is sent for pathology. Hemostasis is obtained and the defect is closed with 4-0 nylon. The area is then dressed and bandaged. The patient tolerated the procedure well without adverse event. Written instructions on wound care were given and were reviewed with the patient, who is to call for any signs of bleeding or infection. The patient will be notified of the pathology results.    Hyperhidrosis, focal, primary  - chronic problem, not at treatment goal  Discussed risks, benefits, side effects, and alternatives to several treatment options, including topical medications (Drysol, Carpe lotion), iontophoresis, oral medications (Robinul, Ditropan), and sympathectomy.  Will try Qbrexa next.  -     glycopyrronium tosylate 2.4 % Towl; Apply 1 each topically once daily.  Dispense: 30 each; Refill: 5      Follow up in about 2 weeks (around 8/20/2025).      Obdulia Her MD, FAAD  [facility]

## 2025-08-07 ENCOUNTER — PATIENT MESSAGE (OUTPATIENT)
Dept: DERMATOLOGY | Facility: CLINIC | Age: 45
End: 2025-08-07
Payer: COMMERCIAL

## 2025-08-07 DIAGNOSIS — L74.519 HYPERHIDROSIS, FOCAL, PRIMARY: ICD-10-CM

## 2025-08-07 RX ORDER — GLYCOPYRRONIUM 2.4 G/100G
1 CLOTH TOPICAL DAILY
Qty: 30 EACH | Refills: 11 | Status: SHIPPED | OUTPATIENT
Start: 2025-08-07

## 2025-08-07 RX ORDER — GLYCOPYRRONIUM 2.4 G/100G
CLOTH TOPICAL
Qty: 30 EACH | Refills: 5 | OUTPATIENT
Start: 2025-08-07

## 2025-08-19 ENCOUNTER — PATIENT MESSAGE (OUTPATIENT)
Dept: DERMATOLOGY | Facility: CLINIC | Age: 45
End: 2025-08-19

## 2025-08-19 ENCOUNTER — OFFICE VISIT (OUTPATIENT)
Dept: DERMATOLOGY | Facility: CLINIC | Age: 45
End: 2025-08-19
Payer: COMMERCIAL

## 2025-08-19 DIAGNOSIS — E11.319 TYPE 2 DIABETES MELLITUS WITH LEFT EYE AFFECTED BY RETINOPATHY WITHOUT MACULAR EDEMA, WITHOUT LONG-TERM CURRENT USE OF INSULIN, UNSPECIFIED RETINOPATHY SEVERITY: ICD-10-CM

## 2025-08-19 DIAGNOSIS — T38.3X5A ADVERSE EFFECT OF SULFONYLUREA, INITIAL ENCOUNTER: ICD-10-CM

## 2025-08-19 DIAGNOSIS — L43.2 LICHENOID DRUG REACTION: Primary | ICD-10-CM

## 2025-08-19 PROCEDURE — 99214 OFFICE O/P EST MOD 30 MIN: CPT | Mod: S$GLB,,, | Performed by: DERMATOLOGY

## 2025-08-19 RX ORDER — GLIMEPIRIDE 2 MG/1
2 TABLET ORAL EVERY MORNING
COMMUNITY
Start: 2025-07-02

## 2025-08-19 RX ORDER — CLOBETASOL PROPIONATE 0.5 MG/G
CREAM TOPICAL
Qty: 120 G | Refills: 3 | Status: SHIPPED | OUTPATIENT
Start: 2025-08-19

## 2025-08-27 ENCOUNTER — PATIENT MESSAGE (OUTPATIENT)
Dept: ADMINISTRATIVE | Facility: HOSPITAL | Age: 45
End: 2025-08-27
Payer: COMMERCIAL

## (undated) DEVICE — ELECTRODE REM PLYHSV RETURN 9

## (undated) DEVICE — ELECTRODE BLD EXT 6.50 ST MDFD

## (undated) DEVICE — DRAIN CHANNEL ROUND 15FR

## (undated) DEVICE — PAD ABD 8X10 STERILE

## (undated) DEVICE — SEE L#120831

## (undated) DEVICE — SUT SILK 2.0 BLK 18

## (undated) DEVICE — GLOVE BIOGEL SKINSENSE PI 6.5

## (undated) DEVICE — STAPLER SKIN ROTATING HEAD

## (undated) DEVICE — APPLICATOR CHLORAPREP ORN 26ML

## (undated) DEVICE — BLADE SURGICAL 15C

## (undated) DEVICE — SOL BETADINE 5%

## (undated) DEVICE — SUT MONO 3-0 PS-2 18 PLST

## (undated) DEVICE — SUT 0 VICRYL / CT-1

## (undated) DEVICE — SUT MONOCRYL 2-0 S UND

## (undated) DEVICE — SUT 2-0 VICRYL / CT-1

## (undated) DEVICE — SUT X425H ETHIBOND 1-0

## (undated) DEVICE — SUT CTD VICRYL 2-0 UND BR P

## (undated) DEVICE — PIN SAFETY LARGE

## (undated) DEVICE — SPONGE LAP 18X18 PREWASHED

## (undated) DEVICE — ELECTRODE CAUTER TIP 2.75IN

## (undated) DEVICE — SYR EAR ULCER SGL USE 3 OZ

## (undated) DEVICE — NDL SAFETY 22G X 1.5 ECLIPSE

## (undated) DEVICE — UNDERGLOVE BIOGEL PI SZ 5.5

## (undated) DEVICE — CLEANER TIP ELECSURG 2X2IN

## (undated) DEVICE — DRESSING TRANS 4X4 TEGADERM

## (undated) DEVICE — Device

## (undated) DEVICE — SUT MONOCRYL 4-0 PS-2

## (undated) DEVICE — COUNTER NDL MAGNETIC STR

## (undated) DEVICE — BOTTLE COLLECT 2ND SEAL CAP

## (undated) DEVICE — COVER SURG LIGHT HANDLE

## (undated) DEVICE — PENCIL ROCKER SWITCH 10FT CORD

## (undated) DEVICE — EVACUATOR WOUND BULB 100CC

## (undated) DEVICE — HANDLE CURETTE W/TUBING

## (undated) DEVICE — DRESSING ANTIMICROBIAL 1 INCH

## (undated) DEVICE — SOL NACL 0.9% INJ PF/100 150

## (undated) DEVICE — SYS LABEL CORRECT MED

## (undated) DEVICE — SEE MEDLINE ITEM 157131

## (undated) DEVICE — SYR 10CC LUER LOCK

## (undated) DEVICE — PACK DRAPE UNIVERSAL CONVERTOR

## (undated) DEVICE — SET DECANTER MEDICHOICE

## (undated) DEVICE — ADHESIVE DERMABOND ADVANCED

## (undated) DEVICE — SEE MEDLINE ITEM 153151

## (undated) DEVICE — JELLY SURGILUBE 5GR

## (undated) DEVICE — GLOVE SURG ULTRA TOUCH 6

## (undated) DEVICE — SEE MEDLINE ITEM 88971

## (undated) DEVICE — SEE MEDLINE ITEM 157116

## (undated) DEVICE — PAD PERINEAL SUPINE

## (undated) DEVICE — SOL LR INJ 1000ML BG

## (undated) DEVICE — SKIN MARKER STER DUAL TIP

## (undated) DEVICE — BLADE SURG #15 CARBON STEEL

## (undated) DEVICE — BLANKET UPPER BODY 78.7X29.9IN

## (undated) DEVICE — SET DISPOSABLE COLLECTION

## (undated) DEVICE — CONTAINER SPECIMEN STRL 4OZ

## (undated) DEVICE — VACURETTE 8MM CURVED

## (undated) DEVICE — PAD PREP 50/CA

## (undated) DEVICE — SUT 3-0 VICRYL / SH (J416)